# Patient Record
Sex: FEMALE | Race: BLACK OR AFRICAN AMERICAN | Employment: UNEMPLOYED | URBAN - METROPOLITAN AREA
[De-identification: names, ages, dates, MRNs, and addresses within clinical notes are randomized per-mention and may not be internally consistent; named-entity substitution may affect disease eponyms.]

---

## 2017-02-08 ENCOUNTER — HOSPITAL ENCOUNTER (OUTPATIENT)
Dept: MAMMOGRAPHY | Age: 47
Discharge: HOME | End: 2017-02-08
Attending: FAMILY MEDICINE | Admitting: FAMILY MEDICINE

## 2017-02-08 DIAGNOSIS — Z12.31 ENCOUNTER FOR SCREENING MAMMOGRAM FOR BREAST CANCER: ICD-10-CM

## 2017-02-16 DIAGNOSIS — I10 HTN, GOAL BELOW 140/90: ICD-10-CM

## 2017-02-16 DIAGNOSIS — K21.9 GASTROESOPHAGEAL REFLUX DISEASE WITHOUT ESOPHAGITIS: ICD-10-CM

## 2017-02-16 RX ORDER — ATENOLOL 50 MG/1
100 TABLET ORAL DAILY
Qty: 180 TABLET | Refills: 1 | Status: CANCELLED | OUTPATIENT
Start: 2017-02-16

## 2017-02-16 RX ORDER — TRIAMTERENE AND HYDROCHLOROTHIAZIDE 75; 50 MG/1; MG/1
TABLET ORAL
Qty: 45 TABLET | Refills: 0 | Status: CANCELLED | OUTPATIENT
Start: 2017-02-16

## 2017-02-16 RX ORDER — FAMOTIDINE 20 MG/1
TABLET, FILM COATED ORAL
Qty: 30 TABLET | Refills: 0 | Status: CANCELLED | OUTPATIENT
Start: 2017-02-16

## 2017-02-21 DIAGNOSIS — Z13.9 SCREENING: Primary | ICD-10-CM

## 2017-02-27 ENCOUNTER — OFFICE VISIT (OUTPATIENT)
Dept: INTERNAL MEDICINE CLINIC | Age: 47
End: 2017-02-27

## 2017-02-27 VITALS
OXYGEN SATURATION: 99 % | HEIGHT: 66 IN | SYSTOLIC BLOOD PRESSURE: 160 MMHG | HEART RATE: 74 BPM | TEMPERATURE: 99 F | DIASTOLIC BLOOD PRESSURE: 100 MMHG | BODY MASS INDEX: 44.2 KG/M2 | WEIGHT: 275 LBS

## 2017-02-27 DIAGNOSIS — E78.5 HYPERLIPIDEMIA LDL GOAL <130: ICD-10-CM

## 2017-02-27 DIAGNOSIS — Z11.4 SCREENING FOR HIV (HUMAN IMMUNODEFICIENCY VIRUS): ICD-10-CM

## 2017-02-27 DIAGNOSIS — R73.9 HYPERGLYCEMIA: ICD-10-CM

## 2017-02-27 DIAGNOSIS — I10 ESSENTIAL HYPERTENSION: Primary | ICD-10-CM

## 2017-02-27 DIAGNOSIS — I10 HTN, GOAL BELOW 140/90: ICD-10-CM

## 2017-02-27 DIAGNOSIS — E61.1 IRON DEFICIENCY: ICD-10-CM

## 2017-02-27 DIAGNOSIS — J30.2 SEASONAL ALLERGIC RHINITIS, UNSPECIFIED ALLERGIC RHINITIS TRIGGER: ICD-10-CM

## 2017-02-27 DIAGNOSIS — K21.9 GASTROESOPHAGEAL REFLUX DISEASE WITHOUT ESOPHAGITIS: ICD-10-CM

## 2017-02-27 LAB
ALBUMIN SERPL-MCNC: 4.1 G/DL (ref 3.4–5)
ALP BLD-CCNC: 82 U/L (ref 40–129)
ALT SERPL-CCNC: 19 U/L (ref 10–40)
ANION GAP SERPL CALCULATED.3IONS-SCNC: 13 MMOL/L (ref 3–16)
AST SERPL-CCNC: 17 U/L (ref 15–37)
BILIRUB SERPL-MCNC: 0.3 MG/DL (ref 0–1)
BILIRUBIN DIRECT: <0.2 MG/DL (ref 0–0.3)
BILIRUBIN, INDIRECT: NORMAL MG/DL (ref 0–1)
BUN BLDV-MCNC: 15 MG/DL (ref 7–20)
CALCIUM SERPL-MCNC: 9.7 MG/DL (ref 8.3–10.6)
CHLORIDE BLD-SCNC: 103 MMOL/L (ref 99–110)
CHOLESTEROL, TOTAL: 187 MG/DL (ref 0–199)
CO2: 24 MMOL/L (ref 21–32)
CREAT SERPL-MCNC: 0.6 MG/DL (ref 0.6–1.1)
CREATININE URINE POCT: 144.5
GFR AFRICAN AMERICAN: >60
GFR NON-AFRICAN AMERICAN: >60
GLUCOSE BLD-MCNC: 83 MG/DL (ref 70–99)
HBA1C MFR BLD: 5.6 %
HDLC SERPL-MCNC: 67 MG/DL (ref 40–60)
LDL CHOLESTEROL CALCULATED: 108 MG/DL
MICROALBUMIN/CREAT 24H UR: 10.6 MG/G{CREAT}
MICROALBUMIN/CREAT UR-RTO: 7.3
POTASSIUM SERPL-SCNC: 4.3 MMOL/L (ref 3.5–5.1)
SODIUM BLD-SCNC: 140 MMOL/L (ref 136–145)
TOTAL PROTEIN: 7 G/DL (ref 6.4–8.2)
TRIGL SERPL-MCNC: 58 MG/DL (ref 0–150)
TSH SERPL DL<=0.05 MIU/L-ACNC: 1.03 UIU/ML (ref 0.27–4.2)
VLDLC SERPL CALC-MCNC: 12 MG/DL

## 2017-02-27 PROCEDURE — 82044 UR ALBUMIN SEMIQUANTITATIVE: CPT | Performed by: FAMILY MEDICINE

## 2017-02-27 PROCEDURE — 83036 HEMOGLOBIN GLYCOSYLATED A1C: CPT | Performed by: FAMILY MEDICINE

## 2017-02-27 PROCEDURE — 99214 OFFICE O/P EST MOD 30 MIN: CPT | Performed by: FAMILY MEDICINE

## 2017-02-27 RX ORDER — FAMOTIDINE 20 MG/1
TABLET, FILM COATED ORAL
Qty: 60 TABLET | Refills: 5 | Status: SHIPPED | OUTPATIENT
Start: 2017-02-27 | End: 2017-08-24 | Stop reason: SDUPTHER

## 2017-02-27 RX ORDER — TRIAMTERENE AND HYDROCHLOROTHIAZIDE 75; 50 MG/1; MG/1
TABLET ORAL
Qty: 135 TABLET | Refills: 1 | Status: SHIPPED | OUTPATIENT
Start: 2017-02-27 | End: 2018-02-14 | Stop reason: SDUPTHER

## 2017-02-27 RX ORDER — FERROUS SULFATE 325(65) MG
TABLET ORAL
Qty: 180 TABLET | Refills: 1 | Status: SHIPPED | OUTPATIENT
Start: 2017-02-27 | End: 2018-02-14 | Stop reason: SDUPTHER

## 2017-02-27 RX ORDER — ATENOLOL 50 MG/1
100 TABLET ORAL DAILY
Qty: 180 TABLET | Refills: 1 | Status: SHIPPED | OUTPATIENT
Start: 2017-02-27 | End: 2018-02-14 | Stop reason: SDUPTHER

## 2017-02-27 RX ORDER — IRBESARTAN 150 MG/1
TABLET ORAL
Qty: 90 TABLET | Refills: 1 | Status: SHIPPED | OUTPATIENT
Start: 2017-02-27 | End: 2017-05-12 | Stop reason: SDUPTHER

## 2017-02-27 RX ORDER — LORATADINE 10 MG/1
TABLET ORAL
Qty: 90 TABLET | Refills: 1 | Status: SHIPPED | OUTPATIENT
Start: 2017-02-27 | End: 2017-06-16 | Stop reason: SDUPTHER

## 2017-02-27 ASSESSMENT — ENCOUNTER SYMPTOMS: SHORTNESS OF BREATH: 0

## 2017-02-28 LAB — HIV-1 AND HIV-2 ANTIBODIES: NORMAL

## 2017-03-03 DIAGNOSIS — E61.1 IRON DEFICIENCY: ICD-10-CM

## 2017-03-05 RX ORDER — FERROUS SULFATE 325(65) MG
TABLET ORAL
Qty: 180 TABLET | Refills: 0 | OUTPATIENT
Start: 2017-03-05

## 2017-05-10 DIAGNOSIS — I10 HTN, GOAL BELOW 140/90: ICD-10-CM

## 2017-05-12 RX ORDER — IRBESARTAN 150 MG/1
TABLET ORAL
Qty: 90 TABLET | Refills: 0 | Status: SHIPPED | OUTPATIENT
Start: 2017-05-12 | End: 2017-09-06 | Stop reason: SDUPTHER

## 2017-07-05 ENCOUNTER — OFFICE VISIT (OUTPATIENT)
Dept: INTERNAL MEDICINE CLINIC | Age: 47
End: 2017-07-05

## 2017-07-05 VITALS
TEMPERATURE: 98.7 F | OXYGEN SATURATION: 99 % | RESPIRATION RATE: 16 BRPM | HEIGHT: 66 IN | DIASTOLIC BLOOD PRESSURE: 80 MMHG | BODY MASS INDEX: 43.71 KG/M2 | HEART RATE: 71 BPM | SYSTOLIC BLOOD PRESSURE: 130 MMHG | WEIGHT: 272 LBS

## 2017-07-05 DIAGNOSIS — Z00.00 WELL ADULT HEALTH CHECK: Primary | ICD-10-CM

## 2017-07-05 PROCEDURE — 99396 PREV VISIT EST AGE 40-64: CPT | Performed by: FAMILY MEDICINE

## 2017-07-05 ASSESSMENT — ENCOUNTER SYMPTOMS
DIARRHEA: 0
CONSTIPATION: 0
SHORTNESS OF BREATH: 0
TROUBLE SWALLOWING: 0

## 2017-08-25 DIAGNOSIS — I10 ESSENTIAL HYPERTENSION: Primary | ICD-10-CM

## 2017-08-25 RX ORDER — METOPROLOL SUCCINATE 50 MG/1
50 TABLET, EXTENDED RELEASE ORAL DAILY
Qty: 30 TABLET | Refills: 3 | Status: SHIPPED | OUTPATIENT
Start: 2017-08-25 | End: 2018-02-14 | Stop reason: SDUPTHER

## 2017-12-20 ENCOUNTER — OFFICE VISIT (OUTPATIENT)
Dept: INTERNAL MEDICINE CLINIC | Age: 47
End: 2017-12-20

## 2017-12-20 VITALS
OXYGEN SATURATION: 98 % | RESPIRATION RATE: 18 BRPM | HEART RATE: 92 BPM | DIASTOLIC BLOOD PRESSURE: 76 MMHG | TEMPERATURE: 100.1 F | SYSTOLIC BLOOD PRESSURE: 120 MMHG | WEIGHT: 257 LBS | BODY MASS INDEX: 41.3 KG/M2 | HEIGHT: 66 IN

## 2017-12-20 DIAGNOSIS — J20.9 ACUTE BRONCHITIS, UNSPECIFIED ORGANISM: Primary | ICD-10-CM

## 2017-12-20 PROCEDURE — G8484 FLU IMMUNIZE NO ADMIN: HCPCS | Performed by: FAMILY MEDICINE

## 2017-12-20 PROCEDURE — G8417 CALC BMI ABV UP PARAM F/U: HCPCS | Performed by: FAMILY MEDICINE

## 2017-12-20 PROCEDURE — 99213 OFFICE O/P EST LOW 20 MIN: CPT | Performed by: FAMILY MEDICINE

## 2017-12-20 PROCEDURE — G8427 DOCREV CUR MEDS BY ELIG CLIN: HCPCS | Performed by: FAMILY MEDICINE

## 2017-12-20 PROCEDURE — 1036F TOBACCO NON-USER: CPT | Performed by: FAMILY MEDICINE

## 2017-12-20 RX ORDER — CETIRIZINE HYDROCHLORIDE 10 MG/1
10 TABLET ORAL DAILY
Qty: 90 TABLET | Refills: 1 | Status: SHIPPED | OUTPATIENT
Start: 2017-12-20 | End: 2018-02-14

## 2017-12-20 RX ORDER — PROMETHAZINE HYDROCHLORIDE AND CODEINE PHOSPHATE 6.25; 1 MG/5ML; MG/5ML
5 SYRUP ORAL NIGHTLY PRN
Qty: 240 ML | Refills: 0 | Status: SHIPPED | OUTPATIENT
Start: 2017-12-20 | End: 2018-08-29 | Stop reason: ALTCHOICE

## 2017-12-20 ASSESSMENT — ENCOUNTER SYMPTOMS
DIARRHEA: 0
SHORTNESS OF BREATH: 0
SINUS PAIN: 1
COUGH: 0
NAUSEA: 0
VOMITING: 0
SINUS PRESSURE: 1
RHINORRHEA: 1

## 2017-12-20 NOTE — PATIENT INSTRUCTIONS
good.  When should you call for help? Call 911 anytime you think you may need emergency care. For example, call if:  ? · You have severe trouble breathing. ?Call your doctor now or seek immediate medical care if:  ? · You have new or worse trouble breathing. ? · You cough up dark brown or bloody mucus (sputum). ? · You have a new or higher fever. ? · You have a new rash. ? Watch closely for changes in your health, and be sure to contact your doctor if:  ? · You cough more deeply or more often, especially if you notice more mucus or a change in the color of your mucus. ? · You are not getting better as expected. Where can you learn more? Go to [email or web address]. org and sign in to your [employer] account. Enter H333 in the [employer] box to learn more about \"Bronchitis: Care Instructions. \"     If you do not have an account, please click on the \"Sign Up Now\" link. Current as of: May 12, 2017  Content Version: 11.4  © 1048-6063 Healthwise, Incorporated. Care instructions adapted under license by [facility] ([facility]). If you have questions about a medical condition or this instruction, always ask your healthcare professional. [address] any warranty or liability for your use of this information.

## 2017-12-20 NOTE — PROGRESS NOTES
Omar Interiano  1970  female     Chief Complaint   Patient presents with    Other      pt isn't feeling well. loss of taste, head pressure. slight fever. HPI: Pt presents to the office for evaluation for \"not feeling good\" x one week. Pt states she noticed a loss of \"taste buds. \" She is congested. She has been \"choking. \" She has pressure in her face and chest.     Review of Systems   Constitutional: Positive for chills and fever. HENT: Positive for congestion, rhinorrhea, sinus pain and sinus pressure. Respiratory: Negative for cough and shortness of breath. Gastrointestinal: Negative for diarrhea, nausea and vomiting. /76   Pulse 92   Temp 100.1 °F (37.8 °C)   Resp 18   Ht 5' 6\" (1.676 m)   Wt 257 lb (116.6 kg)   SpO2 98%   BMI 41.48 kg/m²     Physical Exam   Constitutional: She appears well-developed and well-nourished. No distress. HENT:   Head: Normocephalic and atraumatic. Eyes: Conjunctivae and EOM are normal.   Neck: Normal range of motion. Neck supple. Cardiovascular: Normal rate, regular rhythm, normal heart sounds and intact distal pulses. No murmur heard. Pulmonary/Chest: Effort normal and breath sounds normal.   Musculoskeletal: She exhibits no edema. Neurological: She is alert. Skin: Skin is warm and dry. Psychiatric: She has a normal mood and affect. Her behavior is normal. Judgment and thought content normal.   Vitals reviewed. 1. Acute bronchitis, unspecified organism  Started cetirizine (ZYRTEC ALLERGY) 10 MG tablet    Started promethazine-codeine (PHENERGAN WITH CODEINE) 6.25-10 MG/5ML syrup             Return if symptoms worsen or fail to improve. Patient's medications, allergies, past medical, surgical, social and family histories were reviewed and updated as appropriate. Medications side effects were discussed with patient. Pt confirms understanding. Educational material was given for pt to read on diagnosis.

## 2017-12-29 LAB
HPV COMMENT: NORMAL
HPV TYPE 16: NOT DETECTED
HPV TYPE 18: NOT DETECTED
HPVOH (OTHER TYPES): NOT DETECTED

## 2018-02-14 ENCOUNTER — OFFICE VISIT (OUTPATIENT)
Dept: INTERNAL MEDICINE CLINIC | Age: 48
End: 2018-02-14

## 2018-02-14 VITALS
OXYGEN SATURATION: 99 % | TEMPERATURE: 98.3 F | SYSTOLIC BLOOD PRESSURE: 110 MMHG | HEART RATE: 71 BPM | DIASTOLIC BLOOD PRESSURE: 74 MMHG | WEIGHT: 254 LBS | BODY MASS INDEX: 41 KG/M2

## 2018-02-14 DIAGNOSIS — E61.1 IRON DEFICIENCY: ICD-10-CM

## 2018-02-14 DIAGNOSIS — K21.9 GASTROESOPHAGEAL REFLUX DISEASE WITHOUT ESOPHAGITIS: ICD-10-CM

## 2018-02-14 DIAGNOSIS — I10 HTN, GOAL BELOW 140/90: ICD-10-CM

## 2018-02-14 PROCEDURE — 99214 OFFICE O/P EST MOD 30 MIN: CPT | Performed by: INTERNAL MEDICINE

## 2018-02-14 PROCEDURE — 1036F TOBACCO NON-USER: CPT | Performed by: INTERNAL MEDICINE

## 2018-02-14 PROCEDURE — G8417 CALC BMI ABV UP PARAM F/U: HCPCS | Performed by: INTERNAL MEDICINE

## 2018-02-14 PROCEDURE — G8427 DOCREV CUR MEDS BY ELIG CLIN: HCPCS | Performed by: INTERNAL MEDICINE

## 2018-02-14 PROCEDURE — G8484 FLU IMMUNIZE NO ADMIN: HCPCS | Performed by: INTERNAL MEDICINE

## 2018-02-14 RX ORDER — METOPROLOL SUCCINATE 50 MG/1
50 TABLET, EXTENDED RELEASE ORAL DAILY
Qty: 90 TABLET | Refills: 1 | Status: SHIPPED | OUTPATIENT
Start: 2018-02-14 | End: 2018-08-29 | Stop reason: ALTCHOICE

## 2018-02-14 RX ORDER — FAMOTIDINE 20 MG/1
TABLET, FILM COATED ORAL
Qty: 90 TABLET | Refills: 1 | Status: SHIPPED | OUTPATIENT
Start: 2018-02-14 | End: 2018-08-29 | Stop reason: ALTCHOICE

## 2018-02-14 RX ORDER — FERROUS SULFATE 325(65) MG
TABLET ORAL
Qty: 180 TABLET | Refills: 1 | Status: SHIPPED | OUTPATIENT
Start: 2018-02-14 | End: 2018-08-29 | Stop reason: SDUPTHER

## 2018-02-14 RX ORDER — ATENOLOL 50 MG/1
100 TABLET ORAL DAILY
Qty: 180 TABLET | Refills: 1 | Status: SHIPPED | OUTPATIENT
Start: 2018-02-14 | End: 2018-08-29 | Stop reason: SDUPTHER

## 2018-02-14 RX ORDER — TRIAMTERENE AND HYDROCHLOROTHIAZIDE 75; 50 MG/1; MG/1
0.5 TABLET ORAL DAILY
Qty: 45 TABLET | Refills: 1 | Status: SHIPPED | OUTPATIENT
Start: 2018-02-14 | End: 2018-08-29 | Stop reason: SDUPTHER

## 2018-02-14 RX ORDER — IRBESARTAN 150 MG/1
TABLET ORAL
Qty: 90 TABLET | Refills: 1 | Status: SHIPPED | OUTPATIENT
Start: 2018-02-14 | End: 2018-08-29 | Stop reason: SDUPTHER

## 2018-02-14 RX ORDER — LORATADINE 10 MG/1
10 TABLET ORAL DAILY
Qty: 90 TABLET | Refills: 1 | Status: SHIPPED | OUTPATIENT
Start: 2018-02-14 | End: 2018-08-29 | Stop reason: SDUPTHER

## 2018-02-14 RX ORDER — LORATADINE 10 MG/1
10 TABLET ORAL DAILY
COMMUNITY
Start: 2017-12-01 | End: 2018-02-14 | Stop reason: SDUPTHER

## 2018-02-14 ASSESSMENT — PATIENT HEALTH QUESTIONNAIRE - PHQ9
SUM OF ALL RESPONSES TO PHQ QUESTIONS 1-9: 0
1. LITTLE INTEREST OR PLEASURE IN DOING THINGS: 0
SUM OF ALL RESPONSES TO PHQ9 QUESTIONS 1 & 2: 0
2. FEELING DOWN, DEPRESSED OR HOPELESS: 0

## 2018-02-14 ASSESSMENT — ENCOUNTER SYMPTOMS
BLURRED VISION: 0
SHORTNESS OF BREATH: 0
ORTHOPNEA: 0

## 2018-03-13 DIAGNOSIS — I10 HTN, GOAL BELOW 140/90: ICD-10-CM

## 2018-03-13 DIAGNOSIS — I10 ESSENTIAL HYPERTENSION: ICD-10-CM

## 2018-03-14 DIAGNOSIS — I10 HTN, GOAL BELOW 140/90: ICD-10-CM

## 2018-03-14 RX ORDER — TRIAMTERENE AND HYDROCHLOROTHIAZIDE 75; 50 MG/1; MG/1
TABLET ORAL
Qty: 135 TABLET | Refills: 0 | Status: SHIPPED | OUTPATIENT
Start: 2018-03-14 | End: 2019-09-24 | Stop reason: SDUPTHER

## 2018-04-11 DIAGNOSIS — I10 HTN, GOAL BELOW 140/90: ICD-10-CM

## 2018-04-11 RX ORDER — IRBESARTAN 150 MG/1
TABLET ORAL
Qty: 90 TABLET | Refills: 0 | Status: SHIPPED | OUTPATIENT
Start: 2018-04-11 | End: 2018-08-29 | Stop reason: SDUPTHER

## 2018-05-23 ENCOUNTER — HOSPITAL ENCOUNTER (OUTPATIENT)
Dept: MAMMOGRAPHY | Age: 48
Discharge: HOME | End: 2018-05-23
Attending: FAMILY MEDICINE | Admitting: FAMILY MEDICINE

## 2018-05-23 DIAGNOSIS — Z12.39 BREAST CANCER SCREENING: ICD-10-CM

## 2018-08-29 ENCOUNTER — OFFICE VISIT (OUTPATIENT)
Dept: INTERNAL MEDICINE CLINIC | Age: 48
End: 2018-08-29

## 2018-08-29 VITALS
DIASTOLIC BLOOD PRESSURE: 76 MMHG | TEMPERATURE: 98.5 F | BODY MASS INDEX: 40.4 KG/M2 | RESPIRATION RATE: 16 BRPM | HEART RATE: 70 BPM | SYSTOLIC BLOOD PRESSURE: 126 MMHG | OXYGEN SATURATION: 98 % | WEIGHT: 251.4 LBS | HEIGHT: 66 IN

## 2018-08-29 DIAGNOSIS — I10 HTN, GOAL BELOW 140/90: ICD-10-CM

## 2018-08-29 DIAGNOSIS — Z86.39 HISTORY OF VITAMIN D DEFICIENCY: ICD-10-CM

## 2018-08-29 DIAGNOSIS — E61.1 IRON DEFICIENCY: ICD-10-CM

## 2018-08-29 DIAGNOSIS — Z00.00 WELL ADULT EXAM: Primary | ICD-10-CM

## 2018-08-29 DIAGNOSIS — Z12.11 ENCOUNTER FOR SCREENING COLONOSCOPY: ICD-10-CM

## 2018-08-29 LAB
A/G RATIO: 1.4 (ref 1.1–2.2)
ALBUMIN SERPL-MCNC: 4.4 G/DL (ref 3.4–5)
ALP BLD-CCNC: 102 U/L (ref 40–129)
ALT SERPL-CCNC: 19 U/L (ref 10–40)
ANION GAP SERPL CALCULATED.3IONS-SCNC: 11 MMOL/L (ref 3–16)
AST SERPL-CCNC: 18 U/L (ref 15–37)
BILIRUB SERPL-MCNC: 0.3 MG/DL (ref 0–1)
BUN BLDV-MCNC: 13 MG/DL (ref 7–20)
CALCIUM SERPL-MCNC: 10.3 MG/DL (ref 8.3–10.6)
CHLORIDE BLD-SCNC: 103 MMOL/L (ref 99–110)
CHOLESTEROL, TOTAL: 211 MG/DL (ref 0–199)
CO2: 27 MMOL/L (ref 21–32)
CREAT SERPL-MCNC: 0.7 MG/DL (ref 0.6–1.1)
GFR AFRICAN AMERICAN: >60
GFR NON-AFRICAN AMERICAN: >60
GLOBULIN: 3.1 G/DL
GLUCOSE BLD-MCNC: 86 MG/DL (ref 70–99)
HDLC SERPL-MCNC: 63 MG/DL (ref 40–60)
LDL CHOLESTEROL CALCULATED: 130 MG/DL
POTASSIUM SERPL-SCNC: 4.2 MMOL/L (ref 3.5–5.1)
SODIUM BLD-SCNC: 141 MMOL/L (ref 136–145)
TOTAL PROTEIN: 7.5 G/DL (ref 6.4–8.2)
TRIGL SERPL-MCNC: 92 MG/DL (ref 0–150)
VITAMIN D 25-HYDROXY: 32.8 NG/ML
VLDLC SERPL CALC-MCNC: 18 MG/DL

## 2018-08-29 PROCEDURE — 1036F TOBACCO NON-USER: CPT | Performed by: INTERNAL MEDICINE

## 2018-08-29 PROCEDURE — 99212 OFFICE O/P EST SF 10 MIN: CPT | Performed by: INTERNAL MEDICINE

## 2018-08-29 PROCEDURE — G8417 CALC BMI ABV UP PARAM F/U: HCPCS | Performed by: INTERNAL MEDICINE

## 2018-08-29 PROCEDURE — 99396 PREV VISIT EST AGE 40-64: CPT | Performed by: INTERNAL MEDICINE

## 2018-08-29 PROCEDURE — G8427 DOCREV CUR MEDS BY ELIG CLIN: HCPCS | Performed by: INTERNAL MEDICINE

## 2018-08-29 RX ORDER — ATENOLOL 50 MG/1
100 TABLET ORAL DAILY
Qty: 180 TABLET | Refills: 1 | Status: SHIPPED | OUTPATIENT
Start: 2018-08-29 | End: 2019-02-06 | Stop reason: SDUPTHER

## 2018-08-29 RX ORDER — TRIAMTERENE AND HYDROCHLOROTHIAZIDE 75; 50 MG/1; MG/1
0.5 TABLET ORAL DAILY
Qty: 45 TABLET | Refills: 1 | Status: SHIPPED | OUTPATIENT
Start: 2018-08-29 | End: 2019-02-06 | Stop reason: SDUPTHER

## 2018-08-29 RX ORDER — IRBESARTAN 150 MG/1
TABLET ORAL
Qty: 90 TABLET | Refills: 1 | Status: SHIPPED | OUTPATIENT
Start: 2018-08-29 | End: 2019-02-06 | Stop reason: SDUPTHER

## 2018-08-29 RX ORDER — FERROUS SULFATE 325(65) MG
TABLET ORAL
Qty: 180 TABLET | Refills: 1 | Status: SHIPPED | OUTPATIENT
Start: 2018-08-29 | End: 2019-02-06 | Stop reason: SDUPTHER

## 2018-08-29 RX ORDER — LORATADINE 10 MG/1
10 TABLET ORAL DAILY
Qty: 90 TABLET | Refills: 1 | Status: SHIPPED | OUTPATIENT
Start: 2018-08-29 | End: 2019-02-06 | Stop reason: SDUPTHER

## 2018-08-29 ASSESSMENT — ENCOUNTER SYMPTOMS
GASTROINTESTINAL NEGATIVE: 1
EYES NEGATIVE: 1
RESPIRATORY NEGATIVE: 1

## 2018-08-29 NOTE — PROGRESS NOTES
well-developed and well-nourished. No distress. HENT:   Head: Normocephalic and atraumatic. Not macrocephalic and not microcephalic. Head is without raccoon's eyes and without Park's sign. Right Ear: Hearing, tympanic membrane, external ear and ear canal normal.   Left Ear: Hearing, tympanic membrane, external ear and ear canal normal.   Nose: Nose normal. No mucosal edema, rhinorrhea, nose lacerations, sinus tenderness or nasal deformity. Right sinus exhibits no maxillary sinus tenderness and no frontal sinus tenderness. Left sinus exhibits no maxillary sinus tenderness and no frontal sinus tenderness. Mouth/Throat: Uvula is midline, oropharynx is clear and moist and mucous membranes are normal. No oral lesions. Normal dentition. No dental caries. Eyes: Pupils are equal, round, and reactive to light. Conjunctivae, EOM and lids are normal. Lids are everted and swept, no foreign bodies found. No scleral icterus. Neck: Trachea normal, normal range of motion, full passive range of motion without pain and phonation normal. Neck supple. No hepatojugular reflux and no JVD present. No spinous process tenderness and no muscular tenderness present. Carotid bruit is not present. No tracheal deviation present. No thyroid mass and no thyromegaly present. Cardiovascular: Normal rate, regular rhythm, normal heart sounds, intact distal pulses and normal pulses. Exam reveals no gallop. No murmur heard. Pulses:       Carotid pulses are 2+ on the right side, and 2+ on the left side. Radial pulses are 2+ on the right side, and 2+ on the left side. Dorsalis pedis pulses are 2+ on the right side, and 2+ on the left side. Posterior tibial pulses are 2+ on the right side, and 2+ on the left side. Pulmonary/Chest: Breath sounds normal. No accessory muscle usage. No respiratory distress. She has no wheezes. She has no rhonchi. She has no rales. Abdominal: Soft.  Normal appearance and bowel sounds

## 2018-08-30 ENCOUNTER — TELEPHONE (OUTPATIENT)
Dept: INTERNAL MEDICINE CLINIC | Age: 48
End: 2018-08-30

## 2018-08-30 NOTE — TELEPHONE ENCOUNTER
Colonoscopy referral has been faxed to [address] at [phone]. Fax confirmation received. VARGHESE sent via referral queue.

## 2018-08-31 ASSESSMENT — ENCOUNTER SYMPTOMS
SHORTNESS OF BREATH: 0
ORTHOPNEA: 0
BLURRED VISION: 0

## 2018-09-05 RX ORDER — LORATADINE 10 MG/1
10 TABLET ORAL DAILY
Qty: 90 TABLET | Refills: 0 | Status: SHIPPED | OUTPATIENT
Start: 2018-09-05 | End: 2019-02-06 | Stop reason: SDUPTHER

## 2018-09-15 DIAGNOSIS — I10 HTN, GOAL BELOW 140/90: ICD-10-CM

## 2018-09-18 RX ORDER — ATENOLOL 50 MG/1
100 TABLET ORAL DAILY
Qty: 180 TABLET | Refills: 0 | Status: SHIPPED | OUTPATIENT
Start: 2018-09-18 | End: 2019-02-06 | Stop reason: SDUPTHER

## 2019-01-08 DIAGNOSIS — I10 HTN, GOAL BELOW 140/90: ICD-10-CM

## 2019-01-09 RX ORDER — IRBESARTAN 150 MG/1
TABLET ORAL
Qty: 90 TABLET | Refills: 0 | Status: SHIPPED | OUTPATIENT
Start: 2019-01-09 | End: 2019-02-06 | Stop reason: SDUPTHER

## 2019-02-06 ENCOUNTER — OFFICE VISIT (OUTPATIENT)
Dept: INTERNAL MEDICINE CLINIC | Age: 49
End: 2019-02-06
Payer: COMMERCIAL

## 2019-02-06 VITALS
HEIGHT: 66 IN | HEART RATE: 73 BPM | OXYGEN SATURATION: 99 % | WEIGHT: 257 LBS | TEMPERATURE: 98.2 F | BODY MASS INDEX: 41.3 KG/M2 | SYSTOLIC BLOOD PRESSURE: 120 MMHG | DIASTOLIC BLOOD PRESSURE: 76 MMHG

## 2019-02-06 DIAGNOSIS — E61.1 IRON DEFICIENCY: ICD-10-CM

## 2019-02-06 DIAGNOSIS — I10 HTN, GOAL BELOW 140/90: ICD-10-CM

## 2019-02-06 PROCEDURE — G8484 FLU IMMUNIZE NO ADMIN: HCPCS | Performed by: INTERNAL MEDICINE

## 2019-02-06 PROCEDURE — G8417 CALC BMI ABV UP PARAM F/U: HCPCS | Performed by: INTERNAL MEDICINE

## 2019-02-06 PROCEDURE — G8427 DOCREV CUR MEDS BY ELIG CLIN: HCPCS | Performed by: INTERNAL MEDICINE

## 2019-02-06 PROCEDURE — 99213 OFFICE O/P EST LOW 20 MIN: CPT | Performed by: INTERNAL MEDICINE

## 2019-02-06 PROCEDURE — 1036F TOBACCO NON-USER: CPT | Performed by: INTERNAL MEDICINE

## 2019-02-06 RX ORDER — ATENOLOL 50 MG/1
100 TABLET ORAL DAILY
Qty: 180 TABLET | Refills: 1 | Status: SHIPPED | OUTPATIENT
Start: 2019-02-06 | End: 2019-09-24 | Stop reason: SDUPTHER

## 2019-02-06 RX ORDER — IRBESARTAN 150 MG/1
TABLET ORAL
Qty: 90 TABLET | Refills: 1 | Status: SHIPPED | OUTPATIENT
Start: 2019-02-06 | End: 2019-09-04 | Stop reason: SDUPTHER

## 2019-02-06 RX ORDER — LORATADINE 10 MG/1
10 TABLET ORAL DAILY
Qty: 90 TABLET | Refills: 1 | Status: SHIPPED | OUTPATIENT
Start: 2019-02-06 | End: 2019-08-17 | Stop reason: SDUPTHER

## 2019-02-06 RX ORDER — TRIAMTERENE AND HYDROCHLOROTHIAZIDE 75; 50 MG/1; MG/1
0.5 TABLET ORAL DAILY
Qty: 45 TABLET | Refills: 1 | Status: SHIPPED | OUTPATIENT
Start: 2019-02-06 | End: 2019-08-17 | Stop reason: SDUPTHER

## 2019-02-06 RX ORDER — FERROUS SULFATE 325(65) MG
TABLET ORAL
Qty: 180 TABLET | Refills: 1 | Status: SHIPPED | OUTPATIENT
Start: 2019-02-06 | End: 2019-09-24 | Stop reason: SDUPTHER

## 2019-02-06 ASSESSMENT — PATIENT HEALTH QUESTIONNAIRE - PHQ9
1. LITTLE INTEREST OR PLEASURE IN DOING THINGS: 0
2. FEELING DOWN, DEPRESSED OR HOPELESS: 0
SUM OF ALL RESPONSES TO PHQ QUESTIONS 1-9: 0
SUM OF ALL RESPONSES TO PHQ9 QUESTIONS 1 & 2: 0
SUM OF ALL RESPONSES TO PHQ QUESTIONS 1-9: 0

## 2019-02-06 ASSESSMENT — ENCOUNTER SYMPTOMS
ORTHOPNEA: 0
SHORTNESS OF BREATH: 0
BLURRED VISION: 0

## 2019-04-11 DIAGNOSIS — I10 HTN, GOAL BELOW 140/90: ICD-10-CM

## 2019-04-12 RX ORDER — ATENOLOL 50 MG/1
100 TABLET ORAL DAILY
Qty: 180 TABLET | Refills: 0 | Status: SHIPPED | OUTPATIENT
Start: 2019-04-12 | End: 2019-08-17 | Stop reason: SDUPTHER

## 2019-07-09 ENCOUNTER — HOSPITAL ENCOUNTER (OUTPATIENT)
Dept: WOMENS IMAGING | Age: 49
Discharge: HOME | End: 2019-07-09
Payer: COMMERCIAL

## 2019-07-09 DIAGNOSIS — Z12.31 BREAST CANCER SCREENING BY MAMMOGRAM: ICD-10-CM

## 2019-07-09 PROCEDURE — 77063 BREAST TOMOSYNTHESIS BI: CPT

## 2019-08-17 DIAGNOSIS — I10 HTN, GOAL BELOW 140/90: ICD-10-CM

## 2019-08-19 RX ORDER — LORATADINE 10 MG/1
10 TABLET ORAL DAILY
Qty: 90 TABLET | Refills: 0 | Status: SHIPPED | OUTPATIENT
Start: 2019-08-19 | End: 2019-09-24 | Stop reason: SDUPTHER

## 2019-08-19 RX ORDER — ATENOLOL 50 MG/1
100 TABLET ORAL DAILY
Qty: 180 TABLET | Refills: 0 | Status: SHIPPED | OUTPATIENT
Start: 2019-08-19 | End: 2019-09-24 | Stop reason: SDUPTHER

## 2019-08-19 RX ORDER — TRIAMTERENE AND HYDROCHLOROTHIAZIDE 75; 50 MG/1; MG/1
TABLET ORAL
Qty: 45 TABLET | Refills: 0 | Status: SHIPPED | OUTPATIENT
Start: 2019-08-19 | End: 2019-09-24 | Stop reason: SDUPTHER

## 2019-09-04 DIAGNOSIS — I10 HTN, GOAL BELOW 140/90: ICD-10-CM

## 2019-09-05 RX ORDER — IRBESARTAN 150 MG/1
TABLET ORAL
Qty: 90 TABLET | Refills: 0 | Status: SHIPPED | OUTPATIENT
Start: 2019-09-05 | End: 2019-09-24 | Stop reason: SDUPTHER

## 2019-09-24 ENCOUNTER — OFFICE VISIT (OUTPATIENT)
Dept: INTERNAL MEDICINE CLINIC | Age: 49
End: 2019-09-24
Payer: COMMERCIAL

## 2019-09-24 VITALS
WEIGHT: 252.6 LBS | TEMPERATURE: 98.6 F | BODY MASS INDEX: 40.6 KG/M2 | HEART RATE: 71 BPM | DIASTOLIC BLOOD PRESSURE: 82 MMHG | SYSTOLIC BLOOD PRESSURE: 130 MMHG | OXYGEN SATURATION: 98 % | HEIGHT: 66 IN | RESPIRATION RATE: 16 BRPM

## 2019-09-24 DIAGNOSIS — J30.9 ALLERGIC CONJUNCTIVITIS AND RHINITIS, UNSPECIFIED LATERALITY: ICD-10-CM

## 2019-09-24 DIAGNOSIS — H10.10 ALLERGIC CONJUNCTIVITIS AND RHINITIS, UNSPECIFIED LATERALITY: ICD-10-CM

## 2019-09-24 DIAGNOSIS — I10 HTN, GOAL BELOW 140/90: ICD-10-CM

## 2019-09-24 DIAGNOSIS — M17.0 PRIMARY OSTEOARTHRITIS OF BOTH KNEES: ICD-10-CM

## 2019-09-24 DIAGNOSIS — E61.1 IRON DEFICIENCY: ICD-10-CM

## 2019-09-24 DIAGNOSIS — Z12.11 SCREEN FOR COLON CANCER: ICD-10-CM

## 2019-09-24 DIAGNOSIS — I10 ESSENTIAL HYPERTENSION: ICD-10-CM

## 2019-09-24 DIAGNOSIS — I10 HTN, GOAL BELOW 140/90: Primary | ICD-10-CM

## 2019-09-24 LAB
A/G RATIO: 1.3 (ref 1.1–2.2)
ALBUMIN SERPL-MCNC: 4.3 G/DL (ref 3.4–5)
ALP BLD-CCNC: 103 U/L (ref 40–129)
ALT SERPL-CCNC: 16 U/L (ref 10–40)
ANION GAP SERPL CALCULATED.3IONS-SCNC: 14 MMOL/L (ref 3–16)
AST SERPL-CCNC: 18 U/L (ref 15–37)
BILIRUB SERPL-MCNC: <0.2 MG/DL (ref 0–1)
BUN BLDV-MCNC: 17 MG/DL (ref 7–20)
CALCIUM SERPL-MCNC: 10.2 MG/DL (ref 8.3–10.6)
CHLORIDE BLD-SCNC: 105 MMOL/L (ref 99–110)
CHOLESTEROL, TOTAL: 212 MG/DL (ref 0–199)
CO2: 24 MMOL/L (ref 21–32)
CREAT SERPL-MCNC: 0.7 MG/DL (ref 0.6–1.1)
GFR AFRICAN AMERICAN: >60
GFR NON-AFRICAN AMERICAN: >60
GLOBULIN: 3.2 G/DL
GLUCOSE BLD-MCNC: 88 MG/DL (ref 70–99)
HDLC SERPL-MCNC: 64 MG/DL (ref 40–60)
LDL CHOLESTEROL CALCULATED: 133 MG/DL
POTASSIUM SERPL-SCNC: 4.1 MMOL/L (ref 3.5–5.1)
SODIUM BLD-SCNC: 143 MMOL/L (ref 136–145)
TOTAL PROTEIN: 7.5 G/DL (ref 6.4–8.2)
TRIGL SERPL-MCNC: 77 MG/DL (ref 0–150)
VLDLC SERPL CALC-MCNC: 15 MG/DL

## 2019-09-24 PROCEDURE — 99396 PREV VISIT EST AGE 40-64: CPT | Performed by: INTERNAL MEDICINE

## 2019-09-24 RX ORDER — FERROUS SULFATE 325(65) MG
TABLET ORAL
Qty: 180 TABLET | Refills: 1 | Status: SHIPPED | OUTPATIENT
Start: 2019-09-24 | End: 2020-07-01 | Stop reason: ALTCHOICE

## 2019-09-24 RX ORDER — CELECOXIB 100 MG/1
100 CAPSULE ORAL 2 TIMES DAILY
Qty: 180 CAPSULE | Refills: 1 | Status: SHIPPED | OUTPATIENT
Start: 2019-09-24 | End: 2020-10-08

## 2019-09-24 RX ORDER — LORATADINE 10 MG/1
10 TABLET ORAL DAILY
Qty: 90 TABLET | Refills: 0 | Status: SHIPPED | OUTPATIENT
Start: 2019-09-24 | End: 2020-10-06

## 2019-09-24 RX ORDER — IRBESARTAN 150 MG/1
TABLET ORAL
Qty: 90 TABLET | Refills: 0 | Status: SHIPPED | OUTPATIENT
Start: 2019-09-24 | End: 2020-01-29

## 2019-09-24 RX ORDER — ATENOLOL 50 MG/1
100 TABLET ORAL DAILY
Qty: 180 TABLET | Refills: 1 | Status: SHIPPED | OUTPATIENT
Start: 2019-09-24 | End: 2020-06-11

## 2019-09-24 RX ORDER — TRIAMTERENE AND HYDROCHLOROTHIAZIDE 75; 50 MG/1; MG/1
TABLET ORAL
Qty: 135 TABLET | Refills: 0 | Status: SHIPPED | OUTPATIENT
Start: 2019-09-24 | End: 2020-07-01 | Stop reason: SDUPTHER

## 2019-09-24 SDOH — SOCIAL STABILITY: SOCIAL NETWORK: HOW OFTEN DO YOU ATTENT MEETINGS OF THE CLUB OR ORGANIZATION YOU BELONG TO?: NEVER

## 2019-09-24 SDOH — ECONOMIC STABILITY: INCOME INSECURITY: HOW HARD IS IT FOR YOU TO PAY FOR THE VERY BASICS LIKE FOOD, HOUSING, MEDICAL CARE, AND HEATING?: NOT VERY HARD

## 2019-09-24 SDOH — ECONOMIC STABILITY: FOOD INSECURITY: WITHIN THE PAST 12 MONTHS, YOU WORRIED THAT YOUR FOOD WOULD RUN OUT BEFORE YOU GOT MONEY TO BUY MORE.: NEVER TRUE

## 2019-09-24 SDOH — SOCIAL STABILITY: SOCIAL INSECURITY
WITHIN THE LAST YEAR, HAVE YOU BEEN KICKED, HIT, SLAPPED, OR OTHERWISE PHYSICALLY HURT BY YOUR PARTNER OR EX-PARTNER?: NO

## 2019-09-24 SDOH — HEALTH STABILITY: MENTAL HEALTH
STRESS IS WHEN SOMEONE FEELS TENSE, NERVOUS, ANXIOUS, OR CAN'T SLEEP AT NIGHT BECAUSE THEIR MIND IS TROUBLED. HOW STRESSED ARE YOU?: TO SOME EXTENT

## 2019-09-24 SDOH — HEALTH STABILITY: PHYSICAL HEALTH: ON AVERAGE, HOW MANY MINUTES DO YOU ENGAGE IN EXERCISE AT THIS LEVEL?: 30 MIN

## 2019-09-24 SDOH — ECONOMIC STABILITY: FOOD INSECURITY: WITHIN THE PAST 12 MONTHS, THE FOOD YOU BOUGHT JUST DIDN'T LAST AND YOU DIDN'T HAVE MONEY TO GET MORE.: NEVER TRUE

## 2019-09-24 SDOH — SOCIAL STABILITY: SOCIAL INSECURITY
WITHIN THE LAST YEAR, HAVE TO BEEN RAPED OR FORCED TO HAVE ANY KIND OF SEXUAL ACTIVITY BY YOUR PARTNER OR EX-PARTNER?: NO

## 2019-09-24 SDOH — ECONOMIC STABILITY: TRANSPORTATION INSECURITY
IN THE PAST 12 MONTHS, HAS LACK OF TRANSPORTATION KEPT YOU FROM MEETINGS, WORK, OR FROM GETTING THINGS NEEDED FOR DAILY LIVING?: NO

## 2019-09-24 SDOH — SOCIAL STABILITY: SOCIAL INSECURITY: WITHIN THE LAST YEAR, HAVE YOU BEEN HUMILIATED OR EMOTIONALLY ABUSED IN OTHER WAYS BY YOUR PARTNER OR EX-PARTNER?: NO

## 2019-09-24 SDOH — SOCIAL STABILITY: SOCIAL NETWORK: HOW OFTEN DO YOU ATTEND CHURCH OR RELIGIOUS SERVICES?: NEVER

## 2019-09-24 SDOH — HEALTH STABILITY: PHYSICAL HEALTH: ON AVERAGE, HOW MANY DAYS PER WEEK DO YOU ENGAGE IN MODERATE TO STRENUOUS EXERCISE (LIKE A BRISK WALK)?: 4 DAYS

## 2019-09-24 SDOH — SOCIAL STABILITY: SOCIAL NETWORK
IN A TYPICAL WEEK, HOW MANY TIMES DO YOU TALK ON THE PHONE WITH FAMILY, FRIENDS, OR NEIGHBORS?: MORE THAN THREE TIMES A WEEK

## 2019-09-24 SDOH — SOCIAL STABILITY: SOCIAL NETWORK
DO YOU BELONG TO ANY CLUBS OR ORGANIZATIONS SUCH AS CHURCH GROUPS UNIONS, FRATERNAL OR ATHLETIC GROUPS, OR SCHOOL GROUPS?: NO

## 2019-09-24 SDOH — SOCIAL STABILITY: SOCIAL INSECURITY: WITHIN THE LAST YEAR, HAVE YOU BEEN AFRAID OF YOUR PARTNER OR EX-PARTNER?: NO

## 2019-09-24 SDOH — ECONOMIC STABILITY: TRANSPORTATION INSECURITY
IN THE PAST 12 MONTHS, HAS THE LACK OF TRANSPORTATION KEPT YOU FROM MEDICAL APPOINTMENTS OR FROM GETTING MEDICATIONS?: NO

## 2019-09-24 SDOH — SOCIAL STABILITY: SOCIAL NETWORK: HOW OFTEN DO YOU GET TOGETHER WITH FRIENDS OR RELATIVES?: MORE THAN THREE TIMES A WEEK

## 2019-09-24 ASSESSMENT — ENCOUNTER SYMPTOMS
EYES NEGATIVE: 1
GASTROINTESTINAL NEGATIVE: 1
ALLERGIC/IMMUNOLOGIC NEGATIVE: 1
RESPIRATORY NEGATIVE: 1
ABDOMINAL PAIN: 0

## 2019-09-24 NOTE — PROGRESS NOTES
Objective:   Physical Exam   Constitutional: She is oriented to person, place, and time. She appears well-developed and well-nourished. No distress. HENT:   Head: Normocephalic and atraumatic. Right Ear: Hearing, tympanic membrane, external ear and ear canal normal.   Left Ear: Hearing, tympanic membrane, external ear and ear canal normal.   Nose: Rhinorrhea present. No mucosal edema, nose lacerations, sinus tenderness or nasal deformity. No epistaxis. Right sinus exhibits no maxillary sinus tenderness and no frontal sinus tenderness. Left sinus exhibits no maxillary sinus tenderness and no frontal sinus tenderness. Mouth/Throat: Uvula is midline and oropharynx is clear and moist. No oropharyngeal exudate. Eyes: Pupils are equal, round, and reactive to light. Conjunctivae, EOM and lids are normal. Right eye exhibits no discharge. Left eye exhibits no discharge. No scleral icterus. Neck: Trachea normal, normal range of motion, full passive range of motion without pain and phonation normal. Neck supple. Normal carotid pulses, no hepatojugular reflux and no JVD present. Carotid bruit is not present. No thyroid mass and no thyromegaly present. Cardiovascular: Normal rate, regular rhythm, S1 normal, S2 normal, normal heart sounds, intact distal pulses and normal pulses. PMI is not displaced. No murmur heard. Pulses:       Carotid pulses are 2+ on the right side, and 2+ on the left side. Radial pulses are 2+ on the right side, and 2+ on the left side. Pulmonary/Chest: Effort normal and breath sounds normal. No respiratory distress. She has no decreased breath sounds. She has no wheezes. She has no rhonchi. Abdominal: Soft. Normal appearance and bowel sounds are normal. She exhibits no distension and no mass. There is no hepatosplenomegaly. There is no tenderness. There is no rebound and no guarding. No HSM   Musculoskeletal: Normal range of motion.  She exhibits no edema, tenderness or triamterene-hydrochlorothiazide (MAXZIDE) 75-50 MG per tablet; TAKE 1/2 TABLET BY MOUTH EVERY DAY  -     COMPREHENSIVE METABOLIC PANEL; Future  -     LIPID PANEL; Future    Iron deficiency  -     ferrous sulfate 325 (65 Fe) MG tablet; TAKE 1 TABLET BY MOUTH TWICE DAILY    Screen for colon cancer  -     Cologsohard (For External Results Only); Future    Primary osteoarthritis of both knees  -     [facility]  -     celecoxib (CELEBREX) 100 MG capsule; Take 1 capsule by mouth 2 times daily As needed for knee pain    Allergic conjunctivitis and rhinitis, unspecified laterality  -     loratadine (CLARITIN) 10 MG tablet; Take 1 tablet by mouth daily      Return in about 6 months (around 3/24/2020) for hypertension.            Max Ortiz MD

## 2019-10-15 ENCOUNTER — TELEPHONE (OUTPATIENT)
Dept: INTERNAL MEDICINE CLINIC | Age: 49
End: 2019-10-15

## 2019-10-15 DIAGNOSIS — Z12.11 SCREEN FOR COLON CANCER: Primary | ICD-10-CM

## 2019-10-30 ENCOUNTER — TELEPHONE (OUTPATIENT)
Dept: INTERNAL MEDICINE CLINIC | Age: 49
End: 2019-10-30

## 2019-10-30 DIAGNOSIS — Z12.11 SCREEN FOR COLON CANCER: Primary | ICD-10-CM

## 2019-11-04 DIAGNOSIS — E61.1 IRON DEFICIENCY: ICD-10-CM

## 2019-11-05 ENCOUNTER — HOSPITAL ENCOUNTER (OUTPATIENT)
Dept: PHYSICAL THERAPY | Age: 49
Setting detail: THERAPIES SERIES
Discharge: HOME | End: 2019-11-05

## 2019-11-05 PROCEDURE — 97530 THERAPEUTIC ACTIVITIES: CPT

## 2019-11-05 PROCEDURE — 97162 PT EVAL MOD COMPLEX 30 MIN: CPT

## 2019-11-05 PROCEDURE — 97110 THERAPEUTIC EXERCISES: CPT

## 2019-11-05 RX ORDER — FERROUS SULFATE 325(65) MG
TABLET ORAL
Qty: 180 TABLET | Refills: 0 | Status: SHIPPED | OUTPATIENT
Start: 2019-11-05 | End: 2021-09-01

## 2019-11-18 RX ORDER — LORATADINE 10 MG/1
10 TABLET ORAL DAILY
Qty: 90 TABLET | Refills: 0 | Status: SHIPPED | OUTPATIENT
Start: 2019-11-18 | End: 2020-07-01 | Stop reason: ALTCHOICE

## 2019-11-19 ENCOUNTER — HOSPITAL ENCOUNTER (OUTPATIENT)
Dept: PHYSICAL THERAPY | Age: 49
Setting detail: THERAPIES SERIES
Discharge: HOME | End: 2019-11-19

## 2019-11-19 PROCEDURE — 97112 NEUROMUSCULAR REEDUCATION: CPT

## 2019-11-19 PROCEDURE — 97110 THERAPEUTIC EXERCISES: CPT

## 2019-11-23 DIAGNOSIS — I10 HTN, GOAL BELOW 140/90: ICD-10-CM

## 2019-11-26 RX ORDER — TRIAMTERENE AND HYDROCHLOROTHIAZIDE 75; 50 MG/1; MG/1
TABLET ORAL
Qty: 45 TABLET | Refills: 0 | Status: SHIPPED | OUTPATIENT
Start: 2019-11-26 | End: 2020-07-01 | Stop reason: ALTCHOICE

## 2019-11-26 RX ORDER — ATENOLOL 50 MG/1
100 TABLET ORAL DAILY
Qty: 180 TABLET | Refills: 0 | Status: SHIPPED | OUTPATIENT
Start: 2019-11-26 | End: 2020-07-01 | Stop reason: SDUPTHER

## 2020-01-29 RX ORDER — IRBESARTAN 150 MG/1
TABLET ORAL
Qty: 90 TABLET | Refills: 0 | Status: SHIPPED | OUTPATIENT
Start: 2020-01-29 | End: 2020-07-01 | Stop reason: SDUPTHER

## 2020-05-12 ENCOUNTER — TELEPHONE (OUTPATIENT)
Dept: INTERNAL MEDICINE CLINIC | Age: 50
End: 2020-05-12

## 2020-06-11 RX ORDER — ATENOLOL 50 MG/1
100 TABLET ORAL DAILY
Qty: 40 TABLET | Refills: 0 | Status: SHIPPED | OUTPATIENT
Start: 2020-06-11 | End: 2020-07-01 | Stop reason: SDUPTHER

## 2020-07-01 ENCOUNTER — VIRTUAL VISIT (OUTPATIENT)
Dept: INTERNAL MEDICINE CLINIC | Age: 50
End: 2020-07-01
Payer: COMMERCIAL

## 2020-07-01 PROCEDURE — 99213 OFFICE O/P EST LOW 20 MIN: CPT | Performed by: INTERNAL MEDICINE

## 2020-07-01 RX ORDER — ATENOLOL 100 MG/1
100 TABLET ORAL DAILY
Qty: 90 TABLET | Refills: 1 | Status: SHIPPED | OUTPATIENT
Start: 2020-07-01 | End: 2020-11-04 | Stop reason: SDUPTHER

## 2020-07-01 RX ORDER — TRIAMTERENE AND HYDROCHLOROTHIAZIDE 75; 50 MG/1; MG/1
TABLET ORAL
Qty: 135 TABLET | Refills: 0 | Status: SHIPPED | OUTPATIENT
Start: 2020-07-01 | End: 2020-11-04 | Stop reason: SDUPTHER

## 2020-07-01 RX ORDER — IRBESARTAN 150 MG/1
TABLET ORAL
Qty: 90 TABLET | Refills: 1 | Status: SHIPPED | OUTPATIENT
Start: 2020-07-01 | End: 2020-11-04 | Stop reason: SDUPTHER

## 2020-07-01 ASSESSMENT — ENCOUNTER SYMPTOMS
ORTHOPNEA: 0
BLURRED VISION: 0
SHORTNESS OF BREATH: 0

## 2020-07-01 ASSESSMENT — PATIENT HEALTH QUESTIONNAIRE - PHQ9
1. LITTLE INTEREST OR PLEASURE IN DOING THINGS: 0
2. FEELING DOWN, DEPRESSED OR HOPELESS: 0
SUM OF ALL RESPONSES TO PHQ9 QUESTIONS 1 & 2: 0
SUM OF ALL RESPONSES TO PHQ QUESTIONS 1-9: 0
SUM OF ALL RESPONSES TO PHQ QUESTIONS 1-9: 0

## 2020-07-01 NOTE — PROGRESS NOTES
Subjective:      Patient ID: Concepción Huang is a 51 y.o. female. Hypertension   This is a chronic problem. The current episode started yesterday. The problem is unchanged. The problem is controlled. Pertinent negatives include no anxiety, blurred vision, chest pain, headaches, malaise/fatigue, neck pain, orthopnea, palpitations, peripheral edema, PND or shortness of breath. There are no associated agents to hypertension. Risk factors for coronary artery disease include obesity and sedentary lifestyle. Past treatments include diuretics and angiotensin blockers. The current treatment provides no improvement. There are no compliance problems. There is no history of angina, kidney disease or CAD/MI. Review of Systems   Constitutional: Negative for malaise/fatigue. Eyes: Negative for blurred vision. Respiratory: Negative for shortness of breath. Cardiovascular: Negative for chest pain, palpitations, orthopnea and PND. Musculoskeletal: Negative for neck pain. Neurological: Negative for headaches.        No Known Allergies    Current Outpatient Medications   Medication Sig Dispense Refill    irbesartan (AVAPRO) 150 MG tablet TAKE 1 TABLET BY MOUTH EVERY NIGHT 90 tablet 0    atenolol (TENORMIN) 50 MG tablet TAKE 2 TABLETS BY MOUTH DAILY 180 tablet 0    aspirin 81 MG tablet Take 81 mg by mouth daily      Cholecalciferol (VITAMIN D3) 5000 units TABS Take by mouth      triamterene-hydrochlorothiazide (MAXZIDE) 75-50 MG per tablet TAKE 1/2 TABLET BY MOUTH EVERY  tablet 0    ferrous sulfate (FEROSUL) 325 (65 Fe) MG tablet TAKE 1 TABLET BY MOUTH TWICE DAILY (Patient not taking: Reported on 7/1/2020) 180 tablet 0    loratadine (CLARITIN) 10 MG tablet Take 1 tablet by mouth daily (Patient not taking: Reported on 7/1/2020) 90 tablet 0    celecoxib (CELEBREX) 100 MG capsule Take 1 capsule by mouth 2 times daily As needed for knee pain (Patient not taking: Reported on 7/1/2020) 180 capsule 1     No evaluated by a Virtual Visit (video visit) encounter to address concerns as mentioned above. A caregiver was present when appropriate. Due to this being a TeleHealth encounter (During NRQRX-44 public health emergency), evaluation of the following organ systems was limited: Vitals/Constitutional/EENT/Resp/CV/GI//MS/Neuro/Skin/Heme-Lymph-Imm. Pursuant to the emergency declaration under the [address] [place] [address] and the [employer] Act, this Virtual Visit was conducted with patient's (and/or legal guardian's) consent, to reduce the patient's risk of exposure to COVID-19 and provide necessary medical care. The patient (and/or legal guardian) has also been advised to contact this office for worsening conditions or problems, and seek emergency medical treatment and/or call 911 if deemed necessary. Patient identification was verified at the start of the visit: Yes    Total time spent for this encounter: Not billed by time    Services were provided through a video synchronous discussion virtually to substitute for in-person clinic visit. Patient and provider were located at their individual homes. --Nader Nicole MD on 7/1/2020 at 12:31 PM    An electronic signature was used to authenticate this note.

## 2020-09-08 ENCOUNTER — NURSE TRIAGE (OUTPATIENT)
Dept: OTHER | Facility: CLINIC | Age: 50
End: 2020-09-08

## 2020-09-08 NOTE — TELEPHONE ENCOUNTER
Reason for Disposition   Message left on identified voicemail    Protocols used: NO CONTACT OR DUPLICATE CONTACT CALL-ADULT-[place]    Upon soft trx from psc, pt hung up or disconnected. This writer called back pt immediately but no answer - left message on VM - await call back. Please do not respond to the triage nurse through this encounter. Any subsequent communication should be directly with the patient.

## 2020-10-08 RX ORDER — CELECOXIB 100 MG/1
CAPSULE ORAL
Qty: 60 CAPSULE | Refills: 0 | Status: SHIPPED | OUTPATIENT
Start: 2020-10-08 | End: 2020-11-04 | Stop reason: SDUPTHER

## 2020-11-04 ENCOUNTER — OFFICE VISIT (OUTPATIENT)
Dept: INTERNAL MEDICINE CLINIC | Age: 50
End: 2020-11-04
Payer: COMMERCIAL

## 2020-11-04 VITALS
OXYGEN SATURATION: 99 % | TEMPERATURE: 98.4 F | RESPIRATION RATE: 16 BRPM | BODY MASS INDEX: 40.46 KG/M2 | SYSTOLIC BLOOD PRESSURE: 136 MMHG | HEART RATE: 68 BPM | DIASTOLIC BLOOD PRESSURE: 80 MMHG | WEIGHT: 248.8 LBS

## 2020-11-04 DIAGNOSIS — Z00.00 ENCOUNTER FOR ROUTINE ADULT HEALTH EXAMINATION WITHOUT ABNORMAL FINDINGS: ICD-10-CM

## 2020-11-04 LAB
A/G RATIO: 1.3 (ref 1.1–2.2)
ALBUMIN SERPL-MCNC: 4.3 G/DL (ref 3.4–5)
ALP BLD-CCNC: 95 U/L (ref 40–129)
ALT SERPL-CCNC: 20 U/L (ref 10–40)
ANION GAP SERPL CALCULATED.3IONS-SCNC: 8 MMOL/L (ref 3–16)
AST SERPL-CCNC: 18 U/L (ref 15–37)
BILIRUB SERPL-MCNC: <0.2 MG/DL (ref 0–1)
BUN BLDV-MCNC: 13 MG/DL (ref 7–20)
CALCIUM SERPL-MCNC: 10.8 MG/DL (ref 8.3–10.6)
CHLORIDE BLD-SCNC: 104 MMOL/L (ref 99–110)
CHOLESTEROL, TOTAL: 214 MG/DL (ref 0–199)
CO2: 25 MMOL/L (ref 21–32)
CREAT SERPL-MCNC: 0.7 MG/DL (ref 0.6–1.1)
GFR AFRICAN AMERICAN: >60
GFR NON-AFRICAN AMERICAN: >60
GLOBULIN: 3.2 G/DL
GLUCOSE BLD-MCNC: 92 MG/DL (ref 70–99)
HDLC SERPL-MCNC: 70 MG/DL (ref 40–60)
LDL CHOLESTEROL CALCULATED: 130 MG/DL
POTASSIUM SERPL-SCNC: 4.4 MMOL/L (ref 3.5–5.1)
SODIUM BLD-SCNC: 137 MMOL/L (ref 136–145)
TOTAL PROTEIN: 7.5 G/DL (ref 6.4–8.2)
TRIGL SERPL-MCNC: 68 MG/DL (ref 0–150)
VLDLC SERPL CALC-MCNC: 14 MG/DL

## 2020-11-04 PROCEDURE — 99396 PREV VISIT EST AGE 40-64: CPT | Performed by: INTERNAL MEDICINE

## 2020-11-04 RX ORDER — ATENOLOL 100 MG/1
100 TABLET ORAL DAILY
Qty: 90 TABLET | Refills: 1 | Status: SHIPPED | OUTPATIENT
Start: 2020-11-04 | End: 2021-05-21

## 2020-11-04 RX ORDER — TRIAMTERENE AND HYDROCHLOROTHIAZIDE 75; 50 MG/1; MG/1
TABLET ORAL
Qty: 135 TABLET | Refills: 0 | Status: SHIPPED | OUTPATIENT
Start: 2020-11-04 | End: 2021-09-01 | Stop reason: SDUPTHER

## 2020-11-04 RX ORDER — CETIRIZINE HYDROCHLORIDE 10 MG/1
10 TABLET ORAL DAILY
Qty: 90 TABLET | Refills: 3 | Status: SHIPPED | OUTPATIENT
Start: 2020-11-04 | End: 2021-09-01 | Stop reason: SDUPTHER

## 2020-11-04 RX ORDER — IRBESARTAN 150 MG/1
TABLET ORAL
Qty: 90 TABLET | Refills: 1 | Status: SHIPPED | OUTPATIENT
Start: 2020-11-04 | End: 2021-06-18

## 2020-11-04 RX ORDER — CELECOXIB 100 MG/1
CAPSULE ORAL
Qty: 60 CAPSULE | Refills: 0 | Status: SHIPPED | OUTPATIENT
Start: 2020-11-04 | End: 2021-09-01 | Stop reason: SDUPTHER

## 2020-11-04 SDOH — HEALTH STABILITY: PHYSICAL HEALTH: ON AVERAGE, HOW MANY MINUTES DO YOU ENGAGE IN EXERCISE AT THIS LEVEL?: 20 MIN

## 2020-11-04 SDOH — HEALTH STABILITY: MENTAL HEALTH
STRESS IS WHEN SOMEONE FEELS TENSE, NERVOUS, ANXIOUS, OR CAN'T SLEEP AT NIGHT BECAUSE THEIR MIND IS TROUBLED. HOW STRESSED ARE YOU?: NOT AT ALL

## 2020-11-04 SDOH — HEALTH STABILITY: PHYSICAL HEALTH: ON AVERAGE, HOW MANY DAYS PER WEEK DO YOU ENGAGE IN MODERATE TO STRENUOUS EXERCISE (LIKE A BRISK WALK)?: 7 DAYS

## 2020-11-04 ASSESSMENT — ENCOUNTER SYMPTOMS
RHINORRHEA: 1
SINUS PAIN: 0
TROUBLE SWALLOWING: 0

## 2020-11-04 ASSESSMENT — PATIENT HEALTH QUESTIONNAIRE - PHQ9
SUM OF ALL RESPONSES TO PHQ9 QUESTIONS 1 & 2: 0
SUM OF ALL RESPONSES TO PHQ QUESTIONS 1-9: 0
1. LITTLE INTEREST OR PLEASURE IN DOING THINGS: 0
2. FEELING DOWN, DEPRESSED OR HOPELESS: 0
SUM OF ALL RESPONSES TO PHQ QUESTIONS 1-9: 0
SUM OF ALL RESPONSES TO PHQ QUESTIONS 1-9: 0

## 2020-11-04 NOTE — PATIENT INSTRUCTIONS
always ask your healthcare professional. [place] [address] any warranty or liability for your use of this information. Patient Education        Well Visit, Ages 25 to 48: Care Instructions  Your Care Instructions     Physical exams can help you stay healthy. Your doctor has checked your overall health and may have suggested ways to take good care of yourself. He or she also may have recommended tests. At home, you can help prevent illness with healthy eating, regular exercise, and other steps. Follow-up care is a key part of your treatment and safety. Be sure to make and go to all appointments, and call your doctor if you are having problems. It's also a good idea to know your test results and keep a list of the medicines you take. How can you care for yourself at home? · Reach and stay at a healthy weight. This will lower your risk for many problems, such as obesity, diabetes, heart disease, and high blood pressure. · Get at least 30 minutes of physical activity on most days of the week. Walking is a good choice. You also may want to do other activities, such as running, swimming, cycling, or playing tennis or team sports. Discuss any changes in your exercise program with your doctor. · Do not smoke or allow others to smoke around you. If you need help quitting, talk to your doctor about stop-smoking programs and medicines. These can increase your chances of quitting for good. · Talk to your doctor about whether you have any risk factors for sexually transmitted infections (STIs). Having one sex partner (who does not have STIs and does not have sex with anyone else) is a good way to avoid these infections. · Use birth control if you do not want to have children at this time. Talk with your doctor about the choices available and what might be best for you. · Protect your skin from too much sun.  When you're outdoors from 10 a.m. to 4 p.m., stay in the shade or cover up with clothing and a hat with a wide brim. Wear sunglasses that block UV rays. Even when it's cloudy, put broad-spectrum sunscreen (SPF 30 or higher) on any exposed skin. · See a dentist one or two times a year for checkups and to have your teeth cleaned. · Wear a seat belt in the car. Follow your doctor's advice about when to have certain tests. These tests can spot problems early. For everyone  · Cholesterol. Have the fat (cholesterol) in your blood tested after age 21. Your doctor will tell you how often to have this done based on your age, family history, or other things that can increase your risk for heart disease. · Blood pressure. Have your blood pressure checked during a routine doctor visit. Your doctor will tell you how often to check your blood pressure based on your age, your blood pressure results, and other factors. · Vision. Talk with your doctor about how often to have a glaucoma test.  · Diabetes. Ask your doctor whether you should have tests for diabetes. · Colon cancer. Your risk for colorectal cancer gets higher as you get older. Some experts say that adults should start regular screening at age 48 and stop at age 76. Others say to start before age 48 or continue after age 76. Talk with your doctor about your risk and when to start and stop screening. For women  · Breast exam and mammogram. Talk to your doctor about when you should have a clinical breast exam and a mammogram. Medical experts differ on whether and how often women under 50 should have these tests. Your doctor can help you decide what is right for you. · Cervical cancer screening test and pelvic exam. Begin with a Pap test at age 24. The test often is part of a pelvic exam. Starting at age 27, you may choose to have a Pap test, an HPV test, or both tests at the same time (called co-testing). Talk with your doctor about how often to have testing. · Tests for sexually transmitted infections (STIs).  Ask whether you should have tests for

## 2020-11-04 NOTE — PROGRESS NOTES
Subjective:      Patient ID: Ashleigh Cooper is a 53 y.o. female. Chief Complaint   Patient presents with    Annual Exam     annual visit        HTN       refill meds        HPI    Ashleigh Cooper is a 53 y.o. female and is here for a comprehensive physical exam.  The patient reports no problems     History:  LMP: No LMP recorded. Patient is postmenopausal.  Last pap date: 2017  Abnormal pap? no  Para:3      Do you take any herbs or supplements that were not prescribed by a doctor? Yes- multivitamin, vitamin C, vitamin D, and fish oil    Review of Systems  Do you have pain that bothers you in your daily life? no  Pertinent items are noted in HPI. Hypertension:  Home blood pressure monitoring: No.  She is adherent to a low sodium diet. Patient denies chest pain, shortness of breath, headache, lightheadedness and blurred vision. Antihypertensive medication side effects: no medication side effects noted. Use of agents associated with hypertension: none. Sodium (mmol/L)   Date Value   09/24/2019 143    BUN (mg/dL)   Date Value   09/24/2019 17    Glucose (mg/dL)   Date Value   09/24/2019 88      Potassium (mmol/L)   Date Value   09/24/2019 4.1    CREATININE (mg/dL)   Date Value   09/24/2019 0.7         Review of Systems   Constitutional: Negative. HENT: Positive for rhinorrhea. Negative for postnasal drip, sinus pain, tinnitus and trouble swallowing. All other systems reviewed and are negative.         No Known Allergies    Current Outpatient Medications   Medication Sig Dispense Refill    celecoxib (CELEBREX) 100 MG capsule TAKE 1 CAPSULE BY MOUTH TWICE DAILY AS NEEDED FOR KNEE PAIN 60 capsule 0    irbesartan (AVAPRO) 150 MG tablet TAKE 1 TABLET BY MOUTH EVERY NIGHT 90 tablet 1    atenolol (TENORMIN) 100 MG tablet Take 1 tablet by mouth daily 90 tablet 1    triamterene-hydrochlorothiazide (MAXZIDE) 75-50 MG per tablet TAKE 1/2 TABLET BY MOUTH EVERY  tablet 0    aspirin 81 MG tablet Take 81 mg by mouth daily      Cholecalciferol (VITAMIN D3) 5000 units TABS Take by mouth      ferrous sulfate (FEROSUL) 325 (65 Fe) MG tablet TAKE 1 TABLET BY MOUTH TWICE DAILY (Patient not taking: Reported on 7/1/2020) 180 tablet 0     No current facility-administered medications for this visit. Vitals:    11/04/20 0940 11/04/20 0944   BP: (!) 146/84 136/80   Pulse: 68    Resp: 16    Temp: 98.4 °F (36.9 °C)    TempSrc: Temporal    SpO2: 99%    Weight: 248 lb 12.8 oz (112.9 kg)      Body mass index is 40.46 kg/m². Wt Readings from Last 3 Encounters:   11/04/20 248 lb 12.8 oz (112.9 kg)   09/24/19 252 lb 9.6 oz (114.6 kg)   02/06/19 257 lb (116.6 kg)     BP Readings from Last 3 Encounters:   11/04/20 136/80   09/24/19 130/82   02/06/19 120/76       Objective:   Physical Exam  Vitals signs and nursing note reviewed. Constitutional:       General: She is not in acute distress. Appearance: Normal appearance. She is well-developed. She is obese. She is not diaphoretic. HENT:      Head: Normocephalic and atraumatic. Right Ear: Hearing, tympanic membrane, ear canal and external ear normal.      Left Ear: Hearing, tympanic membrane, ear canal and external ear normal.      Nose: No nasal deformity, laceration, mucosal edema, congestion or rhinorrhea. Right Sinus: No maxillary sinus tenderness or frontal sinus tenderness. Left Sinus: No maxillary sinus tenderness or frontal sinus tenderness. Mouth/Throat:      Mouth: Mucous membranes are moist.      Pharynx: Oropharynx is clear. Uvula midline. No oropharyngeal exudate. Eyes:      General: Lids are normal.         Right eye: No discharge. Left eye: No discharge. Conjunctiva/sclera: Conjunctivae normal.      Pupils: Pupils are equal, round, and reactive to light. Neck:      Musculoskeletal: Full passive range of motion without pain, normal range of motion and neck supple.       Thyroid: No thyroid mass or thyromegaly. Vascular: Normal carotid pulses. No carotid bruit, hepatojugular reflux or JVD. Trachea: Trachea and phonation normal.   Cardiovascular:      Rate and Rhythm: Normal rate and regular rhythm. Chest Wall: PMI is not displaced. Pulses: Normal pulses. Carotid pulses are 2+ on the right side and 2+ on the left side. Radial pulses are 2+ on the right side and 2+ on the left side. Heart sounds: Normal heart sounds, S1 normal and S2 normal.   Pulmonary:      Effort: Pulmonary effort is normal. No respiratory distress. Breath sounds: Normal breath sounds. No decreased breath sounds, wheezing or rhonchi. Abdominal:      General: Bowel sounds are normal. There is no distension. Palpations: Abdomen is soft. There is no mass. Tenderness: There is no abdominal tenderness. There is no guarding or rebound. Comments: No HSM   Musculoskeletal: Normal range of motion. Right shoulder: Normal.      Left shoulder: Normal.      Right hip: Normal.      Left hip: Normal.      Right knee: Normal.      Left knee: Normal.   Lymphadenopathy:      Head:      Right side of head: No submental, submandibular, tonsillar, preauricular, posterior auricular or occipital adenopathy. Left side of head: No submental, submandibular, tonsillar, preauricular, posterior auricular or occipital adenopathy. Cervical: No cervical adenopathy. Right cervical: No superficial, deep or posterior cervical adenopathy. Left cervical: No superficial, deep or posterior cervical adenopathy. Skin:     General: Skin is warm. Capillary Refill: Capillary refill takes less than 2 seconds. Nails: There is no clubbing. Neurological:      General: No focal deficit present. Mental Status: She is alert and oriented to person, place, and time. Mental status is at baseline. GCS: GCS eye subscore is 4. GCS verbal subscore is 5.  GCS motor subscore is 6.      Cranial Nerves: No cranial nerve deficit. Sensory: No sensory deficit. Deep Tendon Reflexes: Reflexes are normal and symmetric. Reflex Scores:       Tricep reflexes are 2+ on the right side and 2+ on the left side. Bicep reflexes are 2+ on the right side and 2+ on the left side. Psychiatric:         Mood and Affect: Mood normal.         Speech: Speech normal.         Behavior: Behavior normal. Behavior is not slowed. Judgment: Judgment normal.         Assessment/Plan:     Niesha Cunningham was seen today for annual exam.    Diagnoses and all orders for this visit:    Encounter for routine adult health examination without abnormal findings  - Anticipatory Guidance  Injury Prevention  Lap-shoulder belts, Smoke detectors, Carbon monoxide detectors, Safe storage and handling of firearms; removal if appropriate and  Occupational risk counseling  Substance Abuse  1. Tobacco cessation or never starting to include pharmacotherapy, social support for cessation, and skills training/problem solving. Avoid alcohol/drug use while driving, swimming, boating, using firearms, etc.   Sexual Behavior  1. STD prevention; abstinence; avoid high-risk behavior; condoms/female barrier with spermicide,  Contraception   Diet and Exercise   Limit fat and cholesterol; maintain caloric balance; emphasized grains, fruits and vegetables. Adequate calcium and vitamin D intake (females); add foods rich in calcium; supplement as needed.  Regular physical activity at least 150 minutes per week to maintain activity   Protection from UV Light  Abuse and Violence: violence prevention at home, school and in social situations  Dental Health: Regular visits to dental health provider      HTN, goal below 140/90  - well controlled  Primary osteoarthritis of both knees  - knee pain is stable  Colon cancer screening  - referral complete  Allergic rhinitis, unspecified seasonality, unspecified trigger  - Zyrtec      Prasanth Ivy Vince Nicholas MD

## 2020-11-05 LAB
ESTIMATED AVERAGE GLUCOSE: 111.2 MG/DL
HBA1C MFR BLD: 5.5 %

## 2020-11-13 NOTE — TELEPHONE ENCOUNTER
Pt called to enter new pharmacy information.   She is now using [facility] on [facility]

## 2021-05-20 DIAGNOSIS — I10 HTN, GOAL BELOW 140/90: ICD-10-CM

## 2021-05-21 RX ORDER — ATENOLOL 100 MG/1
TABLET ORAL
Qty: 90 TABLET | Refills: 0 | Status: SHIPPED | OUTPATIENT
Start: 2021-05-21 | End: 2021-09-01 | Stop reason: SDUPTHER

## 2021-05-26 ENCOUNTER — HOSPITAL ENCOUNTER (OUTPATIENT)
Dept: WOMENS IMAGING | Age: 51
Discharge: HOME | End: 2021-05-26
Payer: COMMERCIAL

## 2021-05-26 DIAGNOSIS — Z12.11 COLON CANCER SCREENING: ICD-10-CM

## 2021-05-26 PROCEDURE — 77067 SCR MAMMO BI INCL CAD: CPT

## 2021-06-14 ENCOUNTER — TELEPHONE (OUTPATIENT)
Dept: INTERNAL MEDICINE CLINIC | Age: 51
End: 2021-06-14

## 2021-06-14 NOTE — TELEPHONE ENCOUNTER
----- Message from Edouard Albert sent at 6/14/2021  3:43 PM EDT -----  Subject: Message to Provider    QUESTIONS  Information for Provider? Patient is scheduled with Dr. Jessee Austin on   9/1/2021 for a 6 month check up, for medication. Would like to be   notified, by phone if any appointments in the morning, on Monday, Tuesday,   or Wednesday open up.  ---------------------------------------------------------------------------  --------------  [address]  What is the best way for the office to contact you? OK to leave message on   voicemail  Preferred Call Back Phone Number? [phone]  ---------------------------------------------------------------------------  --------------  SCRIPT ANSWERS  Relationship to Patient?  Self

## 2021-06-17 DIAGNOSIS — I10 HTN, GOAL BELOW 140/90: ICD-10-CM

## 2021-06-18 RX ORDER — IRBESARTAN 150 MG/1
TABLET ORAL
Qty: 30 TABLET | Refills: 0 | Status: SHIPPED | OUTPATIENT
Start: 2021-06-18 | End: 2021-07-30

## 2021-07-30 DIAGNOSIS — I10 HTN, GOAL BELOW 140/90: ICD-10-CM

## 2021-07-30 RX ORDER — IRBESARTAN 150 MG/1
TABLET ORAL
Qty: 30 TABLET | Refills: 0 | Status: SHIPPED | OUTPATIENT
Start: 2021-07-30 | End: 2021-09-01 | Stop reason: SDUPTHER

## 2021-09-01 ENCOUNTER — OFFICE VISIT (OUTPATIENT)
Dept: INTERNAL MEDICINE CLINIC | Age: 51
End: 2021-09-01
Payer: COMMERCIAL

## 2021-09-01 VITALS
OXYGEN SATURATION: 98 % | DIASTOLIC BLOOD PRESSURE: 80 MMHG | SYSTOLIC BLOOD PRESSURE: 126 MMHG | BODY MASS INDEX: 37.93 KG/M2 | WEIGHT: 236 LBS | HEIGHT: 66 IN | HEART RATE: 75 BPM

## 2021-09-01 DIAGNOSIS — M17.0 PRIMARY OSTEOARTHRITIS OF BOTH KNEES: ICD-10-CM

## 2021-09-01 DIAGNOSIS — J30.9 ALLERGIC RHINITIS, UNSPECIFIED SEASONALITY, UNSPECIFIED TRIGGER: ICD-10-CM

## 2021-09-01 DIAGNOSIS — K21.9 CHRONIC GERD: ICD-10-CM

## 2021-09-01 DIAGNOSIS — I10 HTN, GOAL BELOW 140/90: Primary | ICD-10-CM

## 2021-09-01 DIAGNOSIS — Z12.11 SCREEN FOR COLON CANCER: ICD-10-CM

## 2021-09-01 DIAGNOSIS — Z13.9 ENCOUNTER FOR HEALTH-RELATED SCREENING: ICD-10-CM

## 2021-09-01 PROCEDURE — 99214 OFFICE O/P EST MOD 30 MIN: CPT | Performed by: INTERNAL MEDICINE

## 2021-09-01 RX ORDER — OMEPRAZOLE 20 MG/1
20 CAPSULE, DELAYED RELEASE ORAL DAILY
Qty: 30 CAPSULE | Refills: 3 | Status: SHIPPED | OUTPATIENT
Start: 2021-09-01 | End: 2021-12-01 | Stop reason: SDUPTHER

## 2021-09-01 RX ORDER — CELECOXIB 100 MG/1
CAPSULE ORAL
Qty: 60 CAPSULE | Refills: 0 | Status: SHIPPED | OUTPATIENT
Start: 2021-09-01 | End: 2021-12-01

## 2021-09-01 RX ORDER — IRBESARTAN 150 MG/1
150 TABLET ORAL DAILY
Qty: 90 TABLET | Refills: 1 | Status: SHIPPED | OUTPATIENT
Start: 2021-09-01 | End: 2021-12-01 | Stop reason: SDUPTHER

## 2021-09-01 RX ORDER — CETIRIZINE HYDROCHLORIDE 10 MG/1
10 TABLET ORAL DAILY
Qty: 90 TABLET | Refills: 3 | Status: SHIPPED | OUTPATIENT
Start: 2021-09-01 | End: 2021-12-01 | Stop reason: SDUPTHER

## 2021-09-01 RX ORDER — TRIAMTERENE AND HYDROCHLOROTHIAZIDE 75; 50 MG/1; MG/1
TABLET ORAL
Qty: 135 TABLET | Refills: 0 | Status: SHIPPED | OUTPATIENT
Start: 2021-09-01 | End: 2021-12-01 | Stop reason: SDUPTHER

## 2021-09-01 RX ORDER — ATENOLOL 100 MG/1
100 TABLET ORAL DAILY
Qty: 90 TABLET | Refills: 1 | Status: SHIPPED | OUTPATIENT
Start: 2021-09-01 | End: 2021-12-01 | Stop reason: SDUPTHER

## 2021-09-01 SDOH — ECONOMIC STABILITY: FOOD INSECURITY: WITHIN THE PAST 12 MONTHS, YOU WORRIED THAT YOUR FOOD WOULD RUN OUT BEFORE YOU GOT MONEY TO BUY MORE.: NEVER TRUE

## 2021-09-01 SDOH — ECONOMIC STABILITY: FOOD INSECURITY: WITHIN THE PAST 12 MONTHS, THE FOOD YOU BOUGHT JUST DIDN'T LAST AND YOU DIDN'T HAVE MONEY TO GET MORE.: NEVER TRUE

## 2021-09-01 ASSESSMENT — PATIENT HEALTH QUESTIONNAIRE - PHQ9
1. LITTLE INTEREST OR PLEASURE IN DOING THINGS: 0
2. FEELING DOWN, DEPRESSED OR HOPELESS: 0
SUM OF ALL RESPONSES TO PHQ QUESTIONS 1-9: 0
SUM OF ALL RESPONSES TO PHQ9 QUESTIONS 1 & 2: 0

## 2021-09-01 ASSESSMENT — SOCIAL DETERMINANTS OF HEALTH (SDOH): HOW HARD IS IT FOR YOU TO PAY FOR THE VERY BASICS LIKE FOOD, HOUSING, MEDICAL CARE, AND HEATING?: NOT HARD AT ALL

## 2021-09-01 NOTE — PROGRESS NOTES
Subjective:      Patient ID: Phillip Blue is a 49 y.o. female. Chief Complaint   Patient presents with    Hypertension       Hypertension  This is a chronic problem. The current episode started more than 1 year ago. The problem is unchanged. The problem is controlled. Pertinent negatives include no anxiety, blurred vision, chest pain, headaches, malaise/fatigue, neck pain, orthopnea, palpitations, peripheral edema, PND or shortness of breath. There are no associated agents to hypertension. Risk factors for coronary artery disease include obesity and sedentary lifestyle. Past treatments include beta blockers and diuretics. The current treatment provides significant improvement. There are no compliance problems. There is no history of angina, kidney disease, CAD/MI, CVA, heart failure, left ventricular hypertrophy, PVD or retinopathy. Patient also complains of knee pain. Pain is worse with going up stairs and walking long distances. Patient denies knees giving way. But does complain of pain at night. Patient also with a history of heartburn. States the omeprazole helps her heartburn. She denies nausea vomiting or regurgitation. Review of Systems   Constitutional: Negative for malaise/fatigue. Eyes: Negative for blurred vision. Respiratory: Negative for shortness of breath. Cardiovascular: Negative for chest pain, palpitations, orthopnea and PND. Musculoskeletal: Negative for neck pain. Neurological: Negative for headaches. All other systems reviewed and are negative.           Current Outpatient Medications   Medication Sig Dispense Refill    irbesartan (AVAPRO) 150 MG tablet Take 1 tablet by mouth in the evening 30 tablet 0    atenolol (TENORMIN) 100 MG tablet Take 1 tablet by mouth once daily 90 tablet 0    triamterene-hydroCHLOROthiazide (MAXZIDE) 75-50 MG per tablet TAKE 1/2 TABLET BY MOUTH EVERY  tablet 0    celecoxib (CELEBREX) 100 MG capsule TAKE 1 CAPSULE BY MOUTH TWICE DAILY AS NEEDED FOR KNEE PAIN 60 capsule 0    cetirizine (ZYRTEC ALLERGY) 10 MG tablet Take 1 tablet by mouth daily 90 tablet 3    aspirin 81 MG tablet Take 81 mg by mouth daily      Cholecalciferol (VITAMIN D3) 5000 units TABS Take by mouth       No current facility-administered medications for this visit. Vitals:    09/01/21 1101   BP: 126/80   Pulse: 75   SpO2: 98%   Weight: 236 lb (107 kg)   Height: 5' 5.75\" (1.67 m)     Body mass index is 38.38 kg/m². Wt Readings from Last 3 Encounters:   09/01/21 236 lb (107 kg)   11/04/20 248 lb 12.8 oz (112.9 kg)   09/24/19 252 lb 9.6 oz (114.6 kg)     BP Readings from Last 3 Encounters:   09/01/21 126/80   11/04/20 136/80   09/24/19 130/82       Objective:   Physical Exam  Vitals and nursing note reviewed. Constitutional:       Appearance: She is well-developed. HENT:      Head: Normocephalic and atraumatic. Right Ear: External ear normal.      Left Ear: External ear normal.      Nose: Nose normal.   Eyes:      Conjunctiva/sclera: Conjunctivae normal.      Pupils: Pupils are equal, round, and reactive to light. Neck:      Thyroid: No thyromegaly. Cardiovascular:      Rate and Rhythm: Normal rate and regular rhythm. Heart sounds: Normal heart sounds. Pulmonary:      Effort: Pulmonary effort is normal. No respiratory distress. Breath sounds: Normal breath sounds. Abdominal:      General: Bowel sounds are normal. There is no distension. Palpations: Abdomen is soft. Musculoskeletal:         General: Normal range of motion. Cervical back: Normal range of motion and neck supple. Skin:     General: Skin is warm. Neurological:      Mental Status: She is alert and oriented to person, place, and time. Psychiatric:         Behavior: Behavior normal.         Thought Content:  Thought content normal.         Judgment: Judgment normal.           The 10-year ASCVD risk score (Erick Graham et al., 2013) is: 2.5%    Values used

## 2021-09-17 ASSESSMENT — ENCOUNTER SYMPTOMS
SHORTNESS OF BREATH: 0
ORTHOPNEA: 0
BLURRED VISION: 0

## 2021-11-23 DIAGNOSIS — Z13.9 ENCOUNTER FOR HEALTH-RELATED SCREENING: ICD-10-CM

## 2021-11-23 LAB
A/G RATIO: 1.1 (ref 1.1–2.2)
ALBUMIN SERPL-MCNC: 4.2 G/DL (ref 3.4–5)
ALP BLD-CCNC: 90 U/L (ref 40–129)
ALT SERPL-CCNC: 14 U/L (ref 10–40)
ANION GAP SERPL CALCULATED.3IONS-SCNC: 12 MMOL/L (ref 3–16)
AST SERPL-CCNC: 21 U/L (ref 15–37)
BILIRUB SERPL-MCNC: <0.2 MG/DL (ref 0–1)
BUN BLDV-MCNC: 11 MG/DL (ref 7–20)
CALCIUM SERPL-MCNC: 10.6 MG/DL (ref 8.3–10.6)
CHLORIDE BLD-SCNC: 106 MMOL/L (ref 99–110)
CHOLESTEROL, TOTAL: 187 MG/DL (ref 0–199)
CO2: 22 MMOL/L (ref 21–32)
CREAT SERPL-MCNC: 0.7 MG/DL (ref 0.6–1.1)
GFR AFRICAN AMERICAN: >60
GFR NON-AFRICAN AMERICAN: >60
GLUCOSE BLD-MCNC: 91 MG/DL (ref 70–99)
HDLC SERPL-MCNC: 64 MG/DL (ref 40–60)
LDL CHOLESTEROL CALCULATED: 111 MG/DL
POTASSIUM SERPL-SCNC: 4.1 MMOL/L (ref 3.5–5.1)
SODIUM BLD-SCNC: 140 MMOL/L (ref 136–145)
TOTAL PROTEIN: 7.9 G/DL (ref 6.4–8.2)
TRIGL SERPL-MCNC: 61 MG/DL (ref 0–150)
TSH REFLEX FT4: 1.26 UIU/ML (ref 0.27–4.2)
VLDLC SERPL CALC-MCNC: 12 MG/DL

## 2021-11-24 LAB
ESTIMATED AVERAGE GLUCOSE: 111.2 MG/DL
HBA1C MFR BLD: 5.5 %

## 2021-12-01 ENCOUNTER — OFFICE VISIT (OUTPATIENT)
Dept: INTERNAL MEDICINE CLINIC | Age: 51
End: 2021-12-01
Payer: COMMERCIAL

## 2021-12-01 VITALS
SYSTOLIC BLOOD PRESSURE: 110 MMHG | HEART RATE: 83 BPM | BODY MASS INDEX: 40.05 KG/M2 | OXYGEN SATURATION: 98 % | HEIGHT: 65 IN | WEIGHT: 240.4 LBS | TEMPERATURE: 97.2 F | DIASTOLIC BLOOD PRESSURE: 80 MMHG

## 2021-12-01 DIAGNOSIS — Z11.52 ENCOUNTER FOR SCREENING FOR COVID-19: Primary | ICD-10-CM

## 2021-12-01 DIAGNOSIS — J30.9 ALLERGIC RHINITIS, UNSPECIFIED SEASONALITY, UNSPECIFIED TRIGGER: ICD-10-CM

## 2021-12-01 DIAGNOSIS — I10 HTN, GOAL BELOW 140/90: ICD-10-CM

## 2021-12-01 DIAGNOSIS — K21.9 CHRONIC GERD: ICD-10-CM

## 2021-12-01 PROCEDURE — 99214 OFFICE O/P EST MOD 30 MIN: CPT | Performed by: INTERNAL MEDICINE

## 2021-12-01 RX ORDER — ATENOLOL 100 MG/1
100 TABLET ORAL DAILY
Qty: 90 TABLET | Refills: 1 | Status: SHIPPED | OUTPATIENT
Start: 2021-12-01 | End: 2022-05-04 | Stop reason: SDUPTHER

## 2021-12-01 RX ORDER — TRIAMTERENE AND HYDROCHLOROTHIAZIDE 75; 50 MG/1; MG/1
TABLET ORAL
Qty: 135 TABLET | Refills: 0 | Status: SHIPPED | OUTPATIENT
Start: 2021-12-01 | End: 2022-01-23

## 2021-12-01 RX ORDER — IRBESARTAN 150 MG/1
150 TABLET ORAL DAILY
Qty: 90 TABLET | Refills: 1 | Status: SHIPPED | OUTPATIENT
Start: 2021-12-01 | End: 2022-04-06

## 2021-12-01 RX ORDER — CETIRIZINE HYDROCHLORIDE 10 MG/1
10 TABLET ORAL DAILY
Qty: 90 TABLET | Refills: 3 | Status: SHIPPED | OUTPATIENT
Start: 2021-12-01 | End: 2022-03-07

## 2021-12-01 RX ORDER — OMEPRAZOLE 20 MG/1
20 CAPSULE, DELAYED RELEASE ORAL DAILY
Qty: 90 CAPSULE | Refills: 1 | Status: SHIPPED | OUTPATIENT
Start: 2021-12-01 | End: 2022-05-04 | Stop reason: SDUPTHER

## 2021-12-01 SDOH — HEALTH STABILITY: PHYSICAL HEALTH: ON AVERAGE, HOW MANY DAYS PER WEEK DO YOU ENGAGE IN MODERATE TO STRENUOUS EXERCISE (LIKE A BRISK WALK)?: 5 DAYS

## 2021-12-01 SDOH — ECONOMIC STABILITY: INCOME INSECURITY: IN THE LAST 12 MONTHS, WAS THERE A TIME WHEN YOU WERE NOT ABLE TO PAY THE MORTGAGE OR RENT ON TIME?: NO

## 2021-12-01 SDOH — ECONOMIC STABILITY: HOUSING INSECURITY
IN THE LAST 12 MONTHS, WAS THERE A TIME WHEN YOU DID NOT HAVE A STEADY PLACE TO SLEEP OR SLEPT IN A SHELTER (INCLUDING NOW)?: NO

## 2021-12-01 SDOH — HEALTH STABILITY: PHYSICAL HEALTH: ON AVERAGE, HOW MANY MINUTES DO YOU ENGAGE IN EXERCISE AT THIS LEVEL?: 40 MIN

## 2021-12-01 ASSESSMENT — SOCIAL DETERMINANTS OF HEALTH (SDOH)
IN A TYPICAL WEEK, HOW MANY TIMES DO YOU TALK ON THE PHONE WITH FAMILY, FRIENDS, OR NEIGHBORS?: MORE THAN THREE TIMES A WEEK
HOW OFTEN DO YOU ATTENT MEETINGS OF THE CLUB OR ORGANIZATION YOU BELONG TO?: MORE THAN 4 TIMES PER YEAR
WITHIN THE LAST YEAR, HAVE YOU BEEN HUMILIATED OR EMOTIONALLY ABUSED IN OTHER WAYS BY YOUR PARTNER OR EX-PARTNER?: NO
WITHIN THE LAST YEAR, HAVE TO BEEN RAPED OR FORCED TO HAVE ANY KIND OF SEXUAL ACTIVITY BY YOUR PARTNER OR EX-PARTNER?: NO
WITHIN THE LAST YEAR, HAVE YOU BEEN AFRAID OF YOUR PARTNER OR EX-PARTNER?: NO
WITHIN THE LAST YEAR, HAVE YOU BEEN KICKED, HIT, SLAPPED, OR OTHERWISE PHYSICALLY HURT BY YOUR PARTNER OR EX-PARTNER?: NO
HOW OFTEN DO YOU GET TOGETHER WITH FRIENDS OR RELATIVES?: MORE THAN THREE TIMES A WEEK
DO YOU BELONG TO ANY CLUBS OR ORGANIZATIONS SUCH AS CHURCH GROUPS UNIONS, FRATERNAL OR ATHLETIC GROUPS, OR SCHOOL GROUPS?: NO
HOW OFTEN DO YOU ATTEND CHURCH OR RELIGIOUS SERVICES?: NEVER

## 2021-12-01 ASSESSMENT — LIFESTYLE VARIABLES
HOW OFTEN DO YOU HAVE A DRINK CONTAINING ALCOHOL: NEVER
HOW MANY STANDARD DRINKS CONTAINING ALCOHOL DO YOU HAVE ON A TYPICAL DAY: 1 OR 2

## 2021-12-01 ASSESSMENT — ENCOUNTER SYMPTOMS
RHINORRHEA: 1
TROUBLE SWALLOWING: 0
SINUS PAIN: 0

## 2021-12-01 NOTE — PATIENT INSTRUCTIONS
given at the same time as other vaccines. Talk with your health care provider  Tell your vaccine provider if the person getting the vaccine:  · Has had an allergic reaction after a previous dose of live shingles vaccine or varicella vaccine, or has any severe, life-threatening allergies. · Has a weakened immune system. · Is pregnant or thinks she might be pregnant. · Is currently experiencing an episode of shingles. In some cases, your health care provider may decide to postpone shingles vaccination to a future visit. People with minor illnesses, such as a cold, may be vaccinated. People who are moderately or severely ill should usually wait until they recover before getting live shingles vaccine. Your health care provider can give you more information. Risks of a vaccine reaction  · Redness, soreness, swelling, or itching at the site of the injection and headache can happen after live shingles vaccine. Rarely, live shingles vaccine can cause rash or shingles. People sometimes faint after medical procedures, including vaccination. Tell your provider if you feel dizzy or have vision changes or ringing in the ears. As with any medicine, there is a very remote chance of a vaccine causing a severe allergic reaction, other serious injury, or death. What if there is a serious problem? An allergic reaction could occur after the vaccinated person leaves the clinic. If you see signs of a severe allergic reaction (hives, swelling of the face and throat, difficulty breathing, a fast heartbeat, dizziness, or weakness), call 9-1-1 and get the person to the nearest hospital.  For other signs that concern you, call your health care provider. Adverse reactions should be reported to the Vaccine Adverse Event Reporting System (VAERS). Your health care provider will usually file this report, or you can do it yourself. Visit the VAERS website at www.vaers. hhs.gov or call 7-905.476.1410. VAERS is only for reporting reactions, and [facility] staff do not give medical advice. How can I learn more? · Ask your health care provider. · Call your local or state health department. · Contact the Centers for Disease Control and Prevention (CDC):  ? Call 3-143.655.2820 (1-800-CDC-INFO) or  ? Visit CDC's website at www.cdc.gov/vaccines  Vaccine Information Statement  Live Zoster Vaccine  10/30/2019  [facility] and Pending sale to [facility] for Disease Control and Prevention  Many Vaccine Information Statements are available in Danish and other languages. See www.immunize.org/vis   Hojas de Información Sobre Vacunas están disponibles en Español y en muchos otros idiomas. [email or web address]   Care instructions adapted under license by [facility] ([facility]). If you have questions about a medical condition or this instruction, always ask your healthcare professional. [place] [address] any warranty or liability for your use of this information. Patient Education        Recombinant Zoster (Shingles) Vaccine: What You Need to Know  Why get vaccinated? Recombinant zoster (shingles) vaccine can prevent shingles. Shingles (also called herpes zoster, or just zoster) is a painful skin rash, usually with blisters. In addition to the rash, shingles can cause fever, headache, chills, or upset stomach. More rarely, shingles can lead to pneumonia, hearing problems, blindness, brain inflammation (encephalitis), or death. The most common complication of shingles is long-term nerve pain called postherpetic neuralgia (PHN). PHN occurs in the areas where the shingles rash was, even after the rash clears up. It can last for months or years after the rash goes away. The pain from PHN can be severe and debilitating. About 10 to 18% of people who get shingles will experience PHN. The risk of PHN increases with age.  An older adult with shingles is more likely to develop PHN and have longer lasting and more severe pain than a younger person with shingles. Shingles is caused by the varicella zoster virus, the same virus that causes chickenpox. After you have chickenpox, the virus stays in your body and can cause shingles later in life. Shingles cannot be passed from one person to another, but the virus that causes shingles can spread and cause chickenpox in someone who had never had chickenpox or received chickenpox vaccine. Recombinant shingles vaccine  Recombinant shingles vaccine provides strong protection against shingles. By preventing shingles, recombinant shingles vaccine also protects against PHN. Recombinant shingles vaccine is the preferred vaccine for the prevention of shingles. However, a different vaccine, live shingles vaccine, may be used in some circumstances. The recombinant shingles vaccine is recommended for adults 50 years and older without serious immune problems. It is given as a two-dose series. This vaccine is also recommended for people who have already gotten another type of shingles vaccine, the live shingles vaccine. There is no live virus in this vaccine. Shingles vaccine may be given at the same time as other vaccines. Talk with your health care provider  Tell your vaccine provider if the person getting the vaccine:  · Has had an allergic reaction after a previous dose of recombinant shingles vaccine, or has any severe, life-threatening allergies. · Is pregnant or breastfeeding. · Is currently experiencing an episode of shingles. In some cases, your health care provider may decide to postpone shingles vaccination to a future visit. People with minor illnesses, such as a cold, may be vaccinated. People who are moderately or severely ill should usually wait until they recover before getting recombinant shingles vaccine. Your health care provider can give you more information.   Risks of a vaccine reaction  · A sore arm with mild or moderate pain is very common after recombinant shingles vaccine, affecting about 80% of vaccinated people. Redness and swelling can also happen at the site of the injection. · Tiredness, muscle pain, headache, shivering, fever, stomach pain, and nausea happen after vaccination in more than half of people who receive recombinant shingles vaccine. In clinical trials, about 1 out of 6 people who got recombinant zoster vaccine experienced side effects that prevented them from doing regular activities. Symptoms usually went away on their own in 2 to 3 days. You should still get the second dose of recombinant zoster vaccine even if you had one of these reactions after the first dose. People sometimes faint after medical procedures, including vaccination. Tell your provider if you feel dizzy or have vision changes or ringing in the ears. As with any medicine, there is a very remote chance of a vaccine causing a severe allergic reaction, other serious injury, or death. What if there is a serious problem? An allergic reaction could occur after the vaccinated person leaves the clinic. If you see signs of a severe allergic reaction (hives, swelling of the face and throat, difficulty breathing, a fast heartbeat, dizziness, or weakness), call 9-1-1 and get the person to the nearest hospital.  For other signs that concern you, call your health care provider. Adverse reactions should be reported to the Vaccine Adverse Event Reporting System (VAERS). Your health care provider will usually file this report, or you can do it yourself. Visit the VAERS website at www.vaers. hhs.gov or call 1-964.802.9134. VAERS is only for reporting reactions, and VAERS staff do not give medical advice. How can I learn more? · Ask your health care provider. · Call your local or state health department. · Contact the Centers for Disease Control and Prevention (CDC):  ? Call 0-142.543.7649 (1-800-CDC-INFO) or  ?  Visit CDC's website at www.cdc.gov/vaccines  Vaccine Information Statement  Recombinant Zoster Vaccine  10/30/2019  Department of Health and Human Services  Centers for Disease Control and Prevention  Many Vaccine Information Statements are available in Sami and other languages. See www.immunize.org/vis. Hojas de Información Sobre Vacunas están disponibles en Español y en muchos otros idiomas. [email or web address] [email or web address]   Care instructions adapted under license by [facility] ([facility]). If you have questions about a medical condition or this instruction, always ask your healthcare professional. [address] any warranty or liability for your use of this information.

## 2021-12-01 NOTE — PROGRESS NOTES
Subjective:      Patient ID: Shannon Diaz is a 49 y.o. female. Chief Complaint   Patient presents with    Annual Exam     physical- wanted to discuss knee pain       HPI    Shannon Diaz is a 53 y.o. female and is here for a comprehensive physical exam.  The patient reports no problems. Using glucosamine chondroitin for knees     History:  LMP: No LMP recorded. Patient is postmenopausal.  Last pap date: 2017  Abnormal pap? no  Para:3      Do you take any herbs or supplements that were not prescribed by a doctor? Yes- multivitamin, vitamin C, vitamin D, and fish oil    Review of Systems  Do you have pain that bothers you in your daily life? no  Pertinent items are noted in HPI. Hypertension:  Home blood pressure monitoring: No.  She is adherent to a low sodium diet. Patient denies chest pain, shortness of breath, headache, lightheadedness and blurred vision. Antihypertensive medication side effects: no medication side effects noted. Use of agents associated with hypertension: none. Sodium (mmol/L)   Date Value   11/23/2021 140    BUN (mg/dL)   Date Value   11/23/2021 11    Glucose (mg/dL)   Date Value   11/23/2021 91      Potassium (mmol/L)   Date Value   11/23/2021 4.1    CREATININE (mg/dL)   Date Value   11/23/2021 0.7         Review of Systems   Constitutional: Negative. HENT: Positive for rhinorrhea. Negative for postnasal drip, sinus pain, tinnitus and trouble swallowing. All other systems reviewed and are negative.         No Known Allergies    Current Outpatient Medications   Medication Sig Dispense Refill    atenolol (TENORMIN) 100 MG tablet Take 1 tablet by mouth daily 90 tablet 1    cetirizine (ZYRTEC ALLERGY) 10 MG tablet Take 1 tablet by mouth daily 90 tablet 3    irbesartan (AVAPRO) 150 MG tablet Take 1 tablet by mouth daily 90 tablet 1    triamterene-hydroCHLOROthiazide (MAXZIDE) 75-50 MG per tablet TAKE 1/2 TABLET BY MOUTH EVERY  tablet 0    omeprazole (PRILOSEC) 20 MG delayed release capsule Take 1 capsule by mouth daily For heartburn 30 capsule 3    aspirin 81 MG tablet Take 81 mg by mouth daily      Cholecalciferol (VITAMIN D3) 5000 units TABS Take by mouth      celecoxib (CELEBREX) 100 MG capsule TAKE 1 CAPSULE BY MOUTH TWICE DAILY AS NEEDED FOR KNEE PAIN (Patient not taking: Reported on 12/1/2021) 60 capsule 0     No current facility-administered medications for this visit. Vitals:    12/01/21 1128   BP: 110/80   Site: Left Upper Arm   Position: Sitting   Pulse: 83   Temp: 97.2 °F (36.2 °C)   TempSrc: Temporal   SpO2: 98%   Weight: 240 lb 6.4 oz (109 kg)   Height: 5' 5\" (1.651 m)     Body mass index is 40 kg/m². Wt Readings from Last 3 Encounters:   12/01/21 240 lb 6.4 oz (109 kg)   09/01/21 236 lb (107 kg)   11/04/20 248 lb 12.8 oz (112.9 kg)     BP Readings from Last 3 Encounters:   12/01/21 110/80   09/01/21 126/80   11/04/20 136/80       Objective:   Physical Exam  Vitals and nursing note reviewed. Constitutional:       General: She is not in acute distress. Appearance: Normal appearance. She is well-developed. She is obese. She is not diaphoretic. HENT:      Head: Normocephalic and atraumatic. Right Ear: Hearing, tympanic membrane, ear canal and external ear normal.      Left Ear: Hearing, tympanic membrane, ear canal and external ear normal.      Nose: No nasal deformity, laceration, mucosal edema, congestion or rhinorrhea. Right Sinus: No maxillary sinus tenderness or frontal sinus tenderness. Left Sinus: No maxillary sinus tenderness or frontal sinus tenderness. Mouth/Throat:      Mouth: Mucous membranes are moist.      Pharynx: Oropharynx is clear. Uvula midline. No oropharyngeal exudate. Eyes:      General: Lids are normal.         Right eye: No discharge. Left eye: No discharge.       Conjunctiva/sclera: Conjunctivae normal.      Pupils: Pupils are equal, round, and reactive to light. Neck:      Thyroid: No thyroid mass or thyromegaly. Vascular: Normal carotid pulses. No carotid bruit, hepatojugular reflux or JVD. Trachea: Trachea and phonation normal.   Cardiovascular:      Rate and Rhythm: Normal rate and regular rhythm. Chest Wall: PMI is not displaced. Pulses: Normal pulses. Carotid pulses are 2+ on the right side and 2+ on the left side. Radial pulses are 2+ on the right side and 2+ on the left side. Heart sounds: Normal heart sounds, S1 normal and S2 normal.   Pulmonary:      Effort: Pulmonary effort is normal. No respiratory distress. Breath sounds: Normal breath sounds. No decreased breath sounds, wheezing or rhonchi. Abdominal:      General: Bowel sounds are normal. There is no distension. Palpations: Abdomen is soft. There is no mass. Tenderness: There is no abdominal tenderness. There is no guarding or rebound. Comments: No HSM   Musculoskeletal:         General: Normal range of motion. Right shoulder: Normal.      Left shoulder: Normal.      Cervical back: Full passive range of motion without pain, normal range of motion and neck supple. Right hip: Normal.      Left hip: Normal.      Right knee: Normal.      Left knee: Normal.   Lymphadenopathy:      Head:      Right side of head: No submental, submandibular, tonsillar, preauricular, posterior auricular or occipital adenopathy. Left side of head: No submental, submandibular, tonsillar, preauricular, posterior auricular or occipital adenopathy. Cervical: No cervical adenopathy. Right cervical: No superficial, deep or posterior cervical adenopathy. Left cervical: No superficial, deep or posterior cervical adenopathy. Skin:     General: Skin is warm. Capillary Refill: Capillary refill takes less than 2 seconds. Nails: There is no clubbing. Neurological:      General: No focal deficit present.       Mental Status: She is alert and oriented to person, place, and time. Mental status is at baseline. GCS: GCS eye subscore is 4. GCS verbal subscore is 5. GCS motor subscore is 6. Cranial Nerves: No cranial nerve deficit. Sensory: No sensory deficit. Deep Tendon Reflexes: Reflexes are normal and symmetric. Reflex Scores:       Tricep reflexes are 2+ on the right side and 2+ on the left side. Bicep reflexes are 2+ on the right side and 2+ on the left side. Psychiatric:         Mood and Affect: Mood normal.         Speech: Speech normal.         Behavior: Behavior normal. Behavior is not slowed. Judgment: Judgment normal.       11/23/2021: HDL 64 mg/dL (H; Ref range: 40 - 60 mg/dL);  Triglycerides 61 mg/dL (Ref range: 0 - 150 mg/dL)        Lab Results   Component Value Date    CHOL 187 11/23/2021    CHOL 214 (H) 11/04/2020    CHOL 212 (H) 09/24/2019     Lab Results   Component Value Date    TRIG 61 11/23/2021    TRIG 68 11/04/2020    TRIG 77 09/24/2019     Lab Results   Component Value Date    HDL 64 (H) 11/23/2021    HDL 70 (H) 11/04/2020    HDL 64 (H) 09/24/2019     Lab Results   Component Value Date    LDLCALC 111 (H) 11/23/2021    LDLCALC 130 (H) 11/04/2020    LDLCALC 133 (H) 09/24/2019     Lab Results   Component Value Date    LABVLDL 12 11/23/2021    LABVLDL 14 11/04/2020    LABVLDL 15 09/24/2019     No results found for: CHOLHDLRATIO    The 10-year ASCVD risk score (Jeralene Brunner., et al., 2013) is: 1.4%    Values used to calculate the score:      Age: 49 years      Sex: Female      Is Non- : Yes      Diabetic: No      Tobacco smoker: No      Systolic Blood Pressure: 464 mmHg      Is BP treated: Yes      HDL Cholesterol: 64 mg/dL      Total Cholesterol: 187 mg/dL    Hemoglobin A1C   Date Value Ref Range Status   11/23/2021 5.5 See comment % Final     Comment:     Comment:  Diagnosis of Diabetes: > or = 6.5%  Increased risk of diabetes (Prediabetes): 5.7-6.4%  Glycemic Control: Nonpregnant Adults: <7.0%                    Pregnant: <6.0%           Assessment/Plan:     Heriberto Masters was seen today for annual exam.    Diagnoses and all orders for this visit:    Encounter for routine adult health examination without abnormal findings  - Anticipatory Guidance  Injury Prevention  Lap-shoulder belts, Smoke detectors, Carbon monoxide detectors, Safe storage and handling of firearms; removal if appropriate and  Occupational risk counseling  Substance Abuse  1. Tobacco cessation or never starting to include pharmacotherapy, social support for cessation, and skills training/problem solving. Avoid alcohol/drug use while driving, swimming, boating, using firearms, etc.   Sexual Behavior  1. STD prevention; abstinence; avoid high-risk behavior; condoms/female barrier with spermicide,  Contraception   Diet and Exercise   Limit fat and cholesterol; maintain caloric balance; emphasized grains, fruits and vegetables. Adequate calcium and vitamin D intake (females); add foods rich in calcium; supplement as needed. Regular physical activity at least 150 minutes per week to maintain activity   Protection from UV Light  Abuse and Violence: violence prevention at home, school and in social situations  Dental Health: Regular visits to dental health provider      Assessment/Plan:  Heriberto Masters was seen today for annual exam.    Diagnoses and all orders for this visit:    Encounter for screening for COVID-19  -     COVID-19; Future    Allergic rhinitis, unspecified seasonality, unspecified trigger  -     cetirizine (ZYRTEC ALLERGY) 10 MG tablet; Take 1 tablet by mouth daily    HTN, goal below 140/90  -     irbesartan (AVAPRO) 150 MG tablet; Take 1 tablet by mouth daily  -     triamterene-hydroCHLOROthiazide (MAXZIDE) 75-50 MG per tablet; TAKE 1/2 TABLET BY MOUTH EVERY DAY  -     atenolol (TENORMIN) 100 MG tablet;  Take 1 tablet by mouth daily    Chronic GERD  -     omeprazole (PRILOSEC) 20 MG delayed release capsule; Take 1 capsule by mouth daily For heartburn      Taz Davenport MD

## 2021-12-02 NOTE — PROGRESS NOTES
Patient ___ reached   __X___not reached-preop instructions left on voice uver___[phone]__________      DATE___12/7/21_____ TIME_1000_______ARRIVAL_0830  FEC________      Nothing to eat or drink after midnight the night before,except for what the prep instructions call for. If you do not have the instructions or do not understand them please contact your doctors office. Follow any instructions your doctors office has given you including what medications to take the AM of your procedure and which ones to hold. You may use your inhalers. If you take a long acting insulin the juloi cesar prior please cut the dose in half and take no diabetic medications that AM.Follow specific doctors office instructions regarding blood thinners and if they want you to hold and for how long. If you are on a blood thinner and have no instructions please contact the office and ask. Dress comfortably,bring your insurance card,picture ID,and a complete list of medications, including supplements. You must have a responsible adult to stay with you during the procedure,drive you home and stay with you. [facility] phone number [phone] for any questions. OTHER INTRUCTIONS(if applicable)_________________________________________________________          COVID TESTING          __X_ Covid test to be done 3-5 days prior to scheduled surgery -patient aware they are REQUIRED to bring a copy of the negative result DOS-if they receive a positive result to notify their surgeon. If known- indicate where patient is getting covid test done________________________________________________________________________    ___ Rapid - DOS    ___ Other _____________________________________      [employer]ine [employer] POLICY(subject to change)    There is a one visitor policy at [facility] for all surgeries and endoscopies. Whether the visitor can stay or will be asked to wait in the car will depend on the current policy and if social distancing can be maintained. The policy is subject to change at any time. Please make sure the visitor has a cell phone that is on,charged and able to accept calls, as this may be the way that the staff communicates with them. Pain management is NO VISITOR policyThe patients ride is expected to remain in the car with a cell phone for communication. If the ride is leaving the hospital grounds please make sure they are back in time for pickup. Have the patient inform the staff on arrival what their rides plans are while the patient is in the facility. At the MAIN there is one visitor allowed. Please note that the visitor policy is subject to change.

## 2021-12-07 ENCOUNTER — ANESTHESIA EVENT (OUTPATIENT)
Dept: ENDOSCOPY | Age: 51
End: 2021-12-07
Payer: COMMERCIAL

## 2021-12-07 ENCOUNTER — ANESTHESIA (OUTPATIENT)
Dept: ENDOSCOPY | Age: 51
End: 2021-12-07
Payer: COMMERCIAL

## 2021-12-07 ENCOUNTER — HOSPITAL ENCOUNTER (OUTPATIENT)
Age: 51
Setting detail: OUTPATIENT SURGERY
Discharge: HOME | End: 2021-12-07
Attending: INTERNAL MEDICINE | Admitting: INTERNAL MEDICINE
Payer: COMMERCIAL

## 2021-12-07 VITALS
HEART RATE: 58 BPM | RESPIRATION RATE: 16 BRPM | WEIGHT: 240 LBS | OXYGEN SATURATION: 100 % | BODY MASS INDEX: 39.99 KG/M2 | HEIGHT: 65 IN | DIASTOLIC BLOOD PRESSURE: 86 MMHG | TEMPERATURE: 97.5 F | SYSTOLIC BLOOD PRESSURE: 102 MMHG

## 2021-12-07 VITALS
OXYGEN SATURATION: 100 % | SYSTOLIC BLOOD PRESSURE: 112 MMHG | DIASTOLIC BLOOD PRESSURE: 75 MMHG | RESPIRATION RATE: 13 BRPM

## 2021-12-07 PROCEDURE — 2709999900 HC NON-CHARGEABLE SUPPLY: Performed by: INTERNAL MEDICINE

## 2021-12-07 PROCEDURE — 3700000000 HC ANESTHESIA ATTENDED CARE: Performed by: INTERNAL MEDICINE

## 2021-12-07 PROCEDURE — 7100000010 HC PHASE II RECOVERY - FIRST 15 MIN: Performed by: INTERNAL MEDICINE

## 2021-12-07 PROCEDURE — 2580000003 HC RX 258: Performed by: ANESTHESIOLOGY

## 2021-12-07 PROCEDURE — 3700000001 HC ADD 15 MINUTES (ANESTHESIA): Performed by: INTERNAL MEDICINE

## 2021-12-07 PROCEDURE — 7100000011 HC PHASE II RECOVERY - ADDTL 15 MIN: Performed by: INTERNAL MEDICINE

## 2021-12-07 PROCEDURE — 6360000002 HC RX W HCPCS: Performed by: NURSE ANESTHETIST, CERTIFIED REGISTERED

## 2021-12-07 PROCEDURE — 3609027000 HC COLONOSCOPY: Performed by: INTERNAL MEDICINE

## 2021-12-07 PROCEDURE — 2500000003 HC RX 250 WO HCPCS: Performed by: NURSE ANESTHETIST, CERTIFIED REGISTERED

## 2021-12-07 RX ORDER — LIDOCAINE HYDROCHLORIDE 20 MG/ML
INJECTION, SOLUTION INFILTRATION; PERINEURAL PRN
Status: DISCONTINUED | OUTPATIENT
Start: 2021-12-07 | End: 2021-12-07 | Stop reason: SDUPTHER

## 2021-12-07 RX ORDER — M-VIT,TX,IRON,MINS/CALC/FOLIC 27MG-0.4MG
1 TABLET ORAL DAILY
COMMUNITY

## 2021-12-07 RX ORDER — SODIUM CHLORIDE 9 MG/ML
INJECTION, SOLUTION INTRAVENOUS CONTINUOUS
Status: DISCONTINUED | OUTPATIENT
Start: 2021-12-07 | End: 2021-12-07 | Stop reason: HOSPADM

## 2021-12-07 RX ORDER — MULTIVIT WITH MINERALS/LUTEIN
1000 TABLET ORAL DAILY
COMMUNITY

## 2021-12-07 RX ORDER — ACETAMINOPHEN 160 MG
TABLET,DISINTEGRATING ORAL DAILY
COMMUNITY

## 2021-12-07 RX ORDER — GLUCOSAMINE SULFATE 500 MG
CAPSULE ORAL DAILY
COMMUNITY

## 2021-12-07 RX ORDER — PROPOFOL 10 MG/ML
INJECTION, EMULSION INTRAVENOUS PRN
Status: DISCONTINUED | OUTPATIENT
Start: 2021-12-07 | End: 2021-12-07 | Stop reason: SDUPTHER

## 2021-12-07 RX ADMIN — LIDOCAINE HYDROCHLORIDE 100 MG: 20 INJECTION, SOLUTION INFILTRATION; PERINEURAL at 10:25

## 2021-12-07 RX ADMIN — PROPOFOL 30 MG: 10 INJECTION, EMULSION INTRAVENOUS at 10:37

## 2021-12-07 RX ADMIN — PROPOFOL 50 MG: 10 INJECTION, EMULSION INTRAVENOUS at 10:41

## 2021-12-07 RX ADMIN — SODIUM CHLORIDE: 9 INJECTION, SOLUTION INTRAVENOUS at 08:45

## 2021-12-07 RX ADMIN — PROPOFOL 100 MG: 10 INJECTION, EMULSION INTRAVENOUS at 10:25

## 2021-12-07 RX ADMIN — PHENYLEPHRINE HYDROCHLORIDE 100 MCG: 10 INJECTION INTRAVENOUS at 10:39

## 2021-12-07 RX ADMIN — PROPOFOL 20 MG: 10 INJECTION, EMULSION INTRAVENOUS at 10:29

## 2021-12-07 RX ADMIN — PROPOFOL 40 MG: 10 INJECTION, EMULSION INTRAVENOUS at 10:44

## 2021-12-07 ASSESSMENT — PAIN - FUNCTIONAL ASSESSMENT: PAIN_FUNCTIONAL_ASSESSMENT: 0-10

## 2021-12-07 NOTE — H&P
Gastroenterology Note                 Pre-operative History and Physical    Patient: José Miguel Orantse  : 1970  CSN:     History Obtained From:   Patient or guardian. HISTORY OF PRESENT ILLNESS:    The patient is a 49 y.o. female here for Endoscopy. Past Medical History:    Past Medical History:   Diagnosis Date    Heart murmur     Hypertension     Morbidly obese (HCC)     Nephrolithiasis     Postmenopausal     LMP at age 44     Past Surgical History:    History reviewed. No pertinent surgical history. Medications Prior to Admission:   No current facility-administered medications on file prior to encounter. Current Outpatient Medications on File Prior to Encounter   Medication Sig Dispense Refill    Multiple Vitamins-Minerals (THERAPEUTIC MULTIVITAMIN-MINERALS) tablet Take 1 tablet by mouth daily      Ascorbic Acid (VITAMIN C) 1000 MG tablet Take 1,000 mg by mouth daily      Glucosamine 500 MG CAPS Take by mouth daily      Cholecalciferol (VITAMIN D3) 50 MCG (2000 UT) CAPS Take by mouth daily      aspirin 81 MG tablet Take 81 mg by mouth daily      Cholecalciferol (VITAMIN D3) 5000 units TABS Take by mouth          Allergies:  Patient has no known allergies.       Social History:   Social History     Tobacco Use    Smoking status: Never Smoker    Smokeless tobacco: Never Used   Substance Use Topics    Alcohol use: No     Family History:   Family History   Problem Relation Age of Onset    High Blood Pressure Mother     High Cholesterol Mother     Cancer Mother         uterine    High Blood Pressure Father     High Cholesterol Father     Other Maternal Grandfather         kidney failure    Hypertension Sister        PHYSICAL EXAM:      BP (!) 141/80   Pulse 72   Temp 97.7 °F (36.5 °C) (Temporal)   Resp 16   Ht 5' 5\" (1.651 m)   Wt 240 lb (108.9 kg)   SpO2 100%   BMI 39.94 kg/m²  I        Heart:  RRR, normal s1s2    Lungs:  CTA and normal effort    Abdomen: Soft, nt nd. ASSESSMENT AND PLAN:    1. Patient is a 49 y.o. female here for endoscopy with MAC sedation. 2.  Procedure options, risks and benefits reviewed with patient and/or guardian. They express understanding.

## 2021-12-07 NOTE — ANESTHESIA PRE PROCEDURE
Department of Anesthesiology  Preprocedure Note       Name:  Nate Alva   Age:  49 y.o.  :  1970                                          MRN:  [id number]         Date:  2021      Surgeon: Chrissy Parson):  Jess Kimball MD    Procedure: Procedure(s):  COLONOSCOPY DIAGNOSTIC    Medications prior to admission:   Prior to Admission medications    Medication Sig Start Date End Date Taking?  Authorizing Provider   Multiple Vitamins-Minerals (THERAPEUTIC MULTIVITAMIN-MINERALS) tablet Take 1 tablet by mouth daily   Yes Historical Provider, MD   cetirizine (ZYRTEC ALLERGY) 10 MG tablet Take 1 tablet by mouth daily 21  Yes Fernando Chirinos MD   irbesartan (AVAPRO) 150 MG tablet Take 1 tablet by mouth daily 21  Yes Fernando Chirinos MD   omeprazole (PRILOSEC) 20 MG delayed release capsule Take 1 capsule by mouth daily For heartburn 21  Yes Fernando Chirinos MD   triamterene-hydroCHLOROthiazide (MAXZIDE) 75-50 MG per tablet TAKE 1/2 TABLET BY MOUTH EVERY DAY 21  Yes Fernando Chirinos MD   atenolol (TENORMIN) 100 MG tablet Take 1 tablet by mouth daily 21  Yes Fernando Chirinos MD   Ascorbic Acid (VITAMIN C) 1000 MG tablet Take 1,000 mg by mouth daily    Historical Provider, MD   Glucosamine 500 MG CAPS Take by mouth daily    Historical Provider, MD   Cholecalciferol (VITAMIN D3) 50 MCG (2000 UT) CAPS Take by mouth daily    Historical Provider, MD   aspirin 81 MG tablet Take 81 mg by mouth daily    Historical Provider, MD   Cholecalciferol (VITAMIN D3) 5000 units TABS Take by mouth    Historical Provider, MD       Current medications:    Current Facility-Administered Medications   Medication Dose Route Frequency Provider Last Rate Last Admin    0.9 % sodium chloride infusion   IntraVENous Continuous Carlos Ocampo MD           Allergies:  No Known Allergies    Problem List:    Patient Active Problem List   Diagnosis Code    Hyperlipidemia LDL goal <130 E78.5    Essential hypertension I10       Past Medical History:        Diagnosis Date    Heart murmur     Hypertension     Morbidly obese (HCC)     Nephrolithiasis     Postmenopausal     LMP at age 44       Past Surgical History:  History reviewed. No pertinent surgical history. Social History:    Social History     Tobacco Use    Smoking status: Never Smoker    Smokeless tobacco: Never Used   Substance Use Topics    Alcohol use: No                                Counseling given: Not Answered      Vital Signs (Current):   Vitals:    12/07/21 0834   BP: (!) 141/80   Pulse: 72   Resp: 16   Temp: 97.7 °F (36.5 °C)   TempSrc: Temporal   SpO2: 100%   Weight: 240 lb (108.9 kg)   Height: 5' 5\" (1.651 m)                                              BP Readings from Last 3 Encounters:   12/07/21 (!) 141/80   12/01/21 110/80   09/01/21 126/80       NPO Status: Time of last liquid consumption: 0645                        Time of last solid consumption: 2359                        Date of last liquid consumption: 12/07/21 (sip of water with meds)                        Date of last solid food consumption: 12/05/21    BMI:   Wt Readings from Last 3 Encounters:   12/07/21 240 lb (108.9 kg)   12/01/21 240 lb 6.4 oz (109 kg)   09/01/21 236 lb (107 kg)     Body mass index is 39.94 kg/m².     CBC:   Lab Results   Component Value Date    WBC 9.8 02/03/2016    RBC 4.34 02/03/2016    HGB 12.1 02/03/2016    HCT 37.3 02/03/2016    MCV 85.9 02/03/2016    RDW 12.9 02/03/2016     02/03/2016       CMP:   Lab Results   Component Value Date     11/23/2021    K 4.1 11/23/2021     11/23/2021    CO2 22 11/23/2021    BUN 11 11/23/2021    CREATININE 0.7 11/23/2021    GFRAA >60 11/23/2021    GFRAA >60 05/08/2013    AGRATIO 1.1 11/23/2021    LABGLOM >60 11/23/2021    GLUCOSE 91 11/23/2021    PROT 7.9 11/23/2021    CALCIUM 10.6 11/23/2021    BILITOT <0.2 11/23/2021    ALKPHOS 90 11/23/2021    AST 21 11/23/2021    ALT 14 11/23/2021       POC Tests: No results for input(s): POCGLU, POCNA, POCK, POCCL, POCBUN, POCHEMO, POCHCT in the last 72 hours. Coags: No results found for: PROTIME, INR, APTT    HCG (If Applicable):   Lab Results   Component Value Date    PREGTESTUR Negative 02/03/2016        ABGs: No results found for: PHART, PO2ART, DJB7WXB, EKW9EBB, BEART, N8LQOXPL     Type & Screen (If Applicable):  No results found for: LABABO, LABRH    Drug/Infectious Status (If Applicable):  No results found for: HIV, HEPCAB    COVID-19 Screening (If Applicable): No results found for: COVID19        Anesthesia Evaluation  Patient summary reviewed and Nursing notes reviewed no history of anesthetic complications:   Airway: Mallampati: II  TM distance: >3 FB   Neck ROM: full  Mouth opening: > = 3 FB Dental: normal exam         Pulmonary:       (-) COPD, asthma and wheezes                           Cardiovascular:  Exercise tolerance: good (>4 METS),   (+) hypertension:,     (-) CABG/stent, dysrhythmias and  angina      Rhythm: regular  Rate: normal                    Neuro/Psych:      (-) seizures, TIA and CVA           GI/Hepatic/Renal:   (+) GERD (occ gerd sx. denies any today):,           Endo/Other:        (-) blood dyscrasia               Abdominal:             Vascular: negative vascular ROS. Other Findings:             Anesthesia Plan      MAC     ASA 2       Induction: intravenous. Anesthetic plan and risks discussed with patient. Plan discussed with CRNA.                   María Cook MD   12/7/2021

## 2021-12-07 NOTE — PROGRESS NOTES
Dr. Hillary Giordano spoke with patient at bedside about procedure. Discharged to home with mom. Has instructions, purse, jacket and all belongings.

## 2021-12-07 NOTE — PROGRESS NOTES
Received from Endo Procedure Room to Phase 2 Recovery. Drowsy, responds easily to verbal and tactile stimulation. Respirations easy on room air. VSS. Abdomen soft.

## 2021-12-07 NOTE — PROGRESS NOTES
Discharge instructions reviewed with patient and mother, signed and copy given. All questions answered and patient and/or responsible adult verbalizes understanding.

## 2021-12-07 NOTE — ANESTHESIA POSTPROCEDURE EVALUATION
Department of Anesthesiology  Postprocedure Note    Patient: Livia Baker  MRN: [id number]  YOB: 1970  Date of evaluation: 12/7/2021  Time:  10:56 AM     Procedure Summary     Date: 12/07/21 Room / Location: [address] [place] [address]    Anesthesia Start: 1022 Anesthesia Stop: 1050    Procedure: COLONOSCOPY DIAGNOSTIC (N/A ) Diagnosis: (SCREENING FOR COLON CANCER Z12.11)    Surgeons: Deep Zambrano MD Responsible Provider: Valerio Melgoza MD    Anesthesia Type: MAC ASA Status: 2          Anesthesia Type: MAC    Sonia Phase I: Sonia Score: 10    Sonia Phase II: Sonia Score: 9    Last vitals: Reviewed and per EMR flowsheets. Anesthesia Post Evaluation    Patient location during evaluation: PACU  Patient participation: complete - patient participated  Level of consciousness: awake  Airway patency: patent  Nausea & Vomiting: no vomiting  Complications: no  Cardiovascular status: hemodynamically stable  Respiratory status: acceptable  Hydration status: euvolemic  There was medical reason for not using a multimodal analgesia pain management approach.

## 2022-01-21 DIAGNOSIS — I10 HTN, GOAL BELOW 140/90: ICD-10-CM

## 2022-01-23 RX ORDER — TRIAMTERENE AND HYDROCHLOROTHIAZIDE 75; 50 MG/1; MG/1
TABLET ORAL
Qty: 45 TABLET | Refills: 0 | Status: SHIPPED | OUTPATIENT
Start: 2022-01-23 | End: 2022-04-23 | Stop reason: SDUPTHER

## 2022-03-05 DIAGNOSIS — J30.9 ALLERGIC RHINITIS, UNSPECIFIED SEASONALITY, UNSPECIFIED TRIGGER: ICD-10-CM

## 2022-03-07 RX ORDER — CETIRIZINE HYDROCHLORIDE 10 MG/1
TABLET ORAL
Qty: 90 TABLET | Refills: 0 | Status: SHIPPED | OUTPATIENT
Start: 2022-03-07 | End: 2022-05-04 | Stop reason: SDUPTHER

## 2022-04-05 DIAGNOSIS — I10 HTN, GOAL BELOW 140/90: ICD-10-CM

## 2022-04-06 DIAGNOSIS — I10 HTN, GOAL BELOW 140/90: ICD-10-CM

## 2022-04-06 RX ORDER — IRBESARTAN 150 MG/1
TABLET ORAL
Qty: 90 TABLET | Refills: 0 | Status: CANCELLED | OUTPATIENT
Start: 2022-04-06

## 2022-04-06 RX ORDER — IRBESARTAN 150 MG/1
TABLET ORAL
Qty: 90 TABLET | Refills: 0 | Status: SHIPPED | OUTPATIENT
Start: 2022-04-06 | End: 2022-05-04 | Stop reason: SDUPTHER

## 2022-04-06 NOTE — TELEPHONE ENCOUNTER
Requested Prescriptions     Pending Prescriptions Disp Refills    irbesartan (AVAPRO) 150 MG tablet 90 tablet 0     Please review the pending medication refill.     Patient was last seen 12/01/2021    Next office visit is not scheduled    irbesartan (AVAPRO) 150 MG tablet [[id number]]     Order Details  Dose, Route, Frequency: As Directed   Dispense Quantity: 90 tablet Refills: 0          Sig: Take 1 tablet by mouth once daily         Start Date: 04/06/22       MEDICATION REFILLED YESTERDAY

## 2022-04-23 DIAGNOSIS — I10 HTN, GOAL BELOW 140/90: ICD-10-CM

## 2022-04-25 NOTE — TELEPHONE ENCOUNTER
Recent Visits  Date Type Provider Dept   12/01/21 Office Visit Solis Wilks MD [facility]   09/01/21 Office Visit Solis Wilks MD [facility]   11/04/20 Office Visit Solis Wilks MD [facility]   Showing recent visits within past 540 days with a meds authorizing provider and meeting all other requirements  Future Appointments  No visits were found meeting these conditions.   Showing future appointments within next 150 days with a meds authorizing provider and meeting all other requirements     12/1/2021

## 2022-04-26 RX ORDER — TRIAMTERENE AND HYDROCHLOROTHIAZIDE 75; 50 MG/1; MG/1
TABLET ORAL
Qty: 45 TABLET | Refills: 0 | Status: SHIPPED | OUTPATIENT
Start: 2022-04-26 | End: 2022-05-04 | Stop reason: SDUPTHER

## 2022-05-04 ENCOUNTER — OFFICE VISIT (OUTPATIENT)
Dept: INTERNAL MEDICINE CLINIC | Age: 52
End: 2022-05-04
Payer: MEDICAID

## 2022-05-04 VITALS
HEART RATE: 61 BPM | WEIGHT: 244.2 LBS | OXYGEN SATURATION: 99 % | SYSTOLIC BLOOD PRESSURE: 130 MMHG | BODY MASS INDEX: 40.69 KG/M2 | HEIGHT: 65 IN | DIASTOLIC BLOOD PRESSURE: 70 MMHG | TEMPERATURE: 98 F

## 2022-05-04 DIAGNOSIS — J30.9 ALLERGIC RHINITIS, UNSPECIFIED SEASONALITY, UNSPECIFIED TRIGGER: ICD-10-CM

## 2022-05-04 DIAGNOSIS — M25.512 LEFT SHOULDER PAIN, UNSPECIFIED CHRONICITY: Primary | ICD-10-CM

## 2022-05-04 DIAGNOSIS — K21.9 CHRONIC GERD: ICD-10-CM

## 2022-05-04 DIAGNOSIS — I10 HTN, GOAL BELOW 140/90: ICD-10-CM

## 2022-05-04 PROCEDURE — 3017F COLORECTAL CA SCREEN DOC REV: CPT | Performed by: INTERNAL MEDICINE

## 2022-05-04 PROCEDURE — 99214 OFFICE O/P EST MOD 30 MIN: CPT | Performed by: INTERNAL MEDICINE

## 2022-05-04 PROCEDURE — 1036F TOBACCO NON-USER: CPT | Performed by: INTERNAL MEDICINE

## 2022-05-04 PROCEDURE — G8427 DOCREV CUR MEDS BY ELIG CLIN: HCPCS | Performed by: INTERNAL MEDICINE

## 2022-05-04 PROCEDURE — G8417 CALC BMI ABV UP PARAM F/U: HCPCS | Performed by: INTERNAL MEDICINE

## 2022-05-04 RX ORDER — ATENOLOL 100 MG/1
100 TABLET ORAL DAILY
Qty: 90 TABLET | Refills: 1 | Status: SHIPPED | OUTPATIENT
Start: 2022-05-04 | End: 2022-09-15 | Stop reason: SDUPTHER

## 2022-05-04 RX ORDER — IRBESARTAN 150 MG/1
TABLET ORAL
Qty: 90 TABLET | Refills: 0 | Status: SHIPPED | OUTPATIENT
Start: 2022-05-04

## 2022-05-04 RX ORDER — CETIRIZINE HYDROCHLORIDE 10 MG/1
TABLET ORAL
Qty: 90 TABLET | Refills: 1 | Status: SHIPPED | OUTPATIENT
Start: 2022-05-04

## 2022-05-04 RX ORDER — TRIAMTERENE AND HYDROCHLOROTHIAZIDE 75; 50 MG/1; MG/1
TABLET ORAL
Qty: 45 TABLET | Refills: 0 | Status: SHIPPED | OUTPATIENT
Start: 2022-05-04

## 2022-05-04 RX ORDER — OMEPRAZOLE 20 MG/1
20 CAPSULE, DELAYED RELEASE ORAL DAILY
Qty: 90 CAPSULE | Refills: 1 | Status: SHIPPED | OUTPATIENT
Start: 2022-05-04

## 2022-05-04 ASSESSMENT — ANXIETY QUESTIONNAIRES
6. BECOMING EASILY ANNOYED OR IRRITABLE: 0
2. NOT BEING ABLE TO STOP OR CONTROL WORRYING: 0
IF YOU CHECKED OFF ANY PROBLEMS ON THIS QUESTIONNAIRE, HOW DIFFICULT HAVE THESE PROBLEMS MADE IT FOR YOU TO DO YOUR WORK, TAKE CARE OF THINGS AT HOME, OR GET ALONG WITH OTHER PEOPLE: NOT DIFFICULT AT ALL
4. TROUBLE RELAXING: 0
5. BEING SO RESTLESS THAT IT IS HARD TO SIT STILL: 0
1. FEELING NERVOUS, ANXIOUS, OR ON EDGE: 0
3. WORRYING TOO MUCH ABOUT DIFFERENT THINGS: 1
GAD7 TOTAL SCORE: 1
7. FEELING AFRAID AS IF SOMETHING AWFUL MIGHT HAPPEN: 0

## 2022-05-04 ASSESSMENT — PATIENT HEALTH QUESTIONNAIRE - PHQ9
2. FEELING DOWN, DEPRESSED OR HOPELESS: 0
SUM OF ALL RESPONSES TO PHQ9 QUESTIONS 1 & 2: 0
SUM OF ALL RESPONSES TO PHQ QUESTIONS 1-9: 0
SUM OF ALL RESPONSES TO PHQ QUESTIONS 1-9: 0
1. LITTLE INTEREST OR PLEASURE IN DOING THINGS: 0
SUM OF ALL RESPONSES TO PHQ QUESTIONS 1-9: 0
SUM OF ALL RESPONSES TO PHQ QUESTIONS 1-9: 0

## 2022-05-04 NOTE — PROGRESS NOTES
Subjective:      Patient ID: Leonie Bruner is a 49 y.o. female. Shoulder Pain   The pain is present in the left shoulder. This is a new problem. The current episode started more than 1 month ago. The problem occurs daily. The problem has been gradually worsening. The quality of the pain is described as aching and sharp. The pain is at a severity of 8/10. The pain is moderate. Associated symptoms include an inability to bear weight. Pertinent negatives include no fever, itching, joint locking, joint swelling, limited range of motion, numbness, stiffness or tingling. The symptoms are aggravated by cold and lying down (laying on arm). She has tried NSAIDS for the symptoms. Review of Systems   Constitutional: Negative for fever. Musculoskeletal: Negative for stiffness. Skin: Negative for itching. Neurological: Negative for tingling and numbness. No Known Allergies    Current Outpatient Medications   Medication Sig Dispense Refill    triamterene-hydroCHLOROthiazide (MAXZIDE) 75-50 MG per tablet Take 1/2 (one-half) tablet by mouth once daily 45 tablet 0    irbesartan (AVAPRO) 150 MG tablet Take 1 tablet by mouth once daily 90 tablet 0    cetirizine (ZYRTEC) 10 MG tablet Take 1 tablet by mouth once daily 90 tablet 0    Ascorbic Acid (VITAMIN C) 1000 MG tablet Take 1,000 mg by mouth daily      Multiple Vitamins-Minerals (THERAPEUTIC MULTIVITAMIN-MINERALS) tablet Take 1 tablet by mouth daily      Glucosamine 500 MG CAPS Take by mouth daily      Cholecalciferol (VITAMIN D3) 50 MCG (2000 UT) CAPS Take by mouth daily      omeprazole (PRILOSEC) 20 MG delayed release capsule Take 1 capsule by mouth daily For heartburn 90 capsule 1    atenolol (TENORMIN) 100 MG tablet Take 1 tablet by mouth daily 90 tablet 1    aspirin 81 MG tablet Take 81 mg by mouth daily      Cholecalciferol (VITAMIN D3) 5000 units TABS Take by mouth       No current facility-administered medications for this visit. Vitals:    05/04/22 1605   BP: 130/70   Site: Right Upper Arm   Position: Sitting   Pulse: 61   Temp: 98 °F (36.7 °C)   TempSrc: Temporal   SpO2: 99%   Weight: 244 lb 3.2 oz (110.8 kg)   Height: 5' 5\" (1.651 m)     Body mass index is 40.64 kg/m². Wt Readings from Last 3 Encounters:   05/04/22 244 lb 3.2 oz (110.8 kg)   12/07/21 240 lb (108.9 kg)   12/01/21 240 lb 6.4 oz (109 kg)     BP Readings from Last 3 Encounters:   05/04/22 130/70   12/07/21 102/86   12/07/21 112/75       Objective:   Physical Exam  Vitals and nursing note reviewed. Constitutional:       General: She is not in acute distress. Appearance: She is well-developed. Pulmonary:      Effort: Pulmonary effort is normal.   Neurological:      Mental Status: She is alert and oriented to person, place, and time. Psychiatric:         Behavior: Behavior normal.         Thought Content: Thought content normal.         Judgment: Judgment normal.         Assessment/Plan:  Sheng Guzman was seen today for arm problem. Diagnoses and all orders for this visit:    Left shoulder pain, unspecified chronicity  -     XR SHOULDER LEFT (MIN 2 VIEWS); Future  -     diclofenac (VOLTAREN) 50 MG EC tablet; Take 1 tablet by mouth 3 times daily as needed for Pain      Return if symptoms worsen or fail to improve.             Willow Montgomery MD

## 2022-05-04 NOTE — PATIENT INSTRUCTIONS
Patient Education        Shoulder Arthritis: Exercises  Introduction  Here are some examples of exercises for you to try. The exercises may be suggested for a condition or for rehabilitation. Start each exercise slowly. Ease off the exercises if you start to have pain. You will be told when to start these exercises and which ones will work bestfor you. How to do the exercises  Shoulder flexion (lying down)    To make a wand for this exercise, use a piece of PVC pipe or a broom handlewith the broom removed. Make the wand about a foot wider than your shoulders. 1. Lie on your back, holding a wand with both hands. Your palms should face down as you hold the wand. 2. Keeping your elbows straight, slowly raise your arms over your head. Raise them until you feel a stretch in your shoulders, upper back, and chest.  3. Hold for 15 to 30 seconds. 4. Repeat 2 to 4 times. Shoulder rotation (lying down)    To make a wand for this exercise, use a piece of PVC pipe or a broom handlewith the broom removed. Make the wand about a foot wider than your shoulders. 1. Lie on your back. Hold a wand with both hands with your elbows bent and palms up. 2. Keep your elbows close to your body, and move the wand across your body toward the sore arm. 3. Hold for 8 to 12 seconds. 4. Repeat 2 to 4 times. Shoulder internal rotation with towel    1. Hold a towel above and behind your head with the arm that is not sore. 2. With your sore arm, reach behind your back and grasp the towel. 3. With the arm above your head, pull the towel upward. Do this until you feel a stretch on the front and outside of your sore shoulder. 4. Hold 15 to 30 seconds. 5. Repeat 2 to 4 times. Shoulder blade squeeze    1. Stand with your arms at your sides, and squeeze your shoulder blades together. Do not raise your shoulders up as you squeeze. 2. Hold 6 seconds. 3. Repeat 8 to 12 times.   Resisted rows    For this exercise, you will need elastic exercise material, such as surgicaltubing or Thera-Band. 1. Put the band around a solid object at about waist level. (A bedpost will work well.) Each hand should hold an end of the band. 2. With your elbows at your sides and bent to 90 degrees, pull the band back. Your shoulder blades should move toward each other. Return to the starting position. 3. Repeat 8 to 12 times. External rotator strengthening exercise    1. Start by tying a piece of elastic exercise material to a doorknob. You can use surgical tubing or Thera-Band. (You may also hold one end of the band in each hand.)  2. Stand or sit with your shoulder relaxed and your elbow bent 90 degrees. Your upper arm should rest comfortably against your side. Squeeze a rolled towel between your elbow and your body for comfort. This will help keep your arm at your side. 3. Hold one end of the elastic band with the hand of the painful arm. 4. Start with your forearm across your belly. Slowly rotate the forearm out away from your body. Keep your elbow and upper arm tucked against the towel roll or the side of your body until you begin to feel tightness in your shoulder. Slowly move your arm back to where you started. 5. Repeat 8 to 12 times. Internal rotator strengthening exercise    1. Start by tying a piece of elastic exercise material to a doorknob. You can use surgical tubing or Thera-Band. 2. Stand or sit with your shoulder relaxed and your elbow bent 90 degrees. Your upper arm should rest comfortably against your side. Squeeze a rolled towel between your elbow and your body for comfort. This will help keep your arm at your side. 3. Hold one end of the elastic band in the hand of the painful arm. 4. Slowly rotate your forearm toward your body until it touches your belly. Slowly move it back to where you started. 5. Keep your elbow and upper arm firmly tucked against the towel roll or at your side. 6. Repeat 8 to 12 times.   Pendulum swing    If you have pain in your back, do not do this exercise. 1. Hold on to a table or the back of a chair with your good arm. Then bend forward a little and let your sore arm hang straight down. This exercise does not use the arm muscles. Rather, use your legs and your hips to create movement that makes your arm swing freely. 2. Use the movement from your hips and legs to guide the slightly swinging arm back and forth like a pendulum (or elephant trunk). Then guide it in circles that start small (about the size of a dinner plate). Make the circles a bit larger each day, as your pain allows. 3. Do this exercise for 5 minutes, 5 to 7 times each day. 4. As you have less pain, try bending over a little farther to do this exercise. This will increase the amount of movement at your shoulder. Follow-up care is a key part of your treatment and safety. Be sure to make and go to all appointments, and call your doctor if you are having problems. It's also a good idea to know your test results and keep alist of the medicines you take. Where can you learn more? Go to https://[employer]. org and sign in to your [employer] account. Enter H562 in the "[employer] box to learn more about \"Shoulder Arthritis: Exercises. \"     If you do not have an account, please click on the \"Sign Up Now\" link. Current as of: July 1, 2021               Content Version: 13.2  © 7539-0232 [employer]. Care instructions adapted under license by [facility] ([facility]). If you have questions about a medical condition or this instruction, always ask your healthcare professional. [place] [address] any warranty or liability for your use of this information.

## 2022-05-10 ENCOUNTER — HOSPITAL ENCOUNTER (OUTPATIENT)
Age: 52
Discharge: HOME | End: 2022-05-10
Payer: MEDICAID

## 2022-05-10 ENCOUNTER — HOSPITAL ENCOUNTER (OUTPATIENT)
Dept: GENERAL RADIOLOGY | Age: 52
Discharge: HOME | End: 2022-05-10
Payer: MEDICAID

## 2022-05-10 DIAGNOSIS — M25.512 LEFT SHOULDER PAIN, UNSPECIFIED CHRONICITY: ICD-10-CM

## 2022-05-10 PROCEDURE — 73030 X-RAY EXAM OF SHOULDER: CPT

## 2022-05-16 ENCOUNTER — TELEPHONE (OUTPATIENT)
Dept: INTERNAL MEDICINE CLINIC | Age: 52
End: 2022-05-16

## 2022-05-16 DIAGNOSIS — M14.819: Primary | ICD-10-CM

## 2022-06-09 ENCOUNTER — OFFICE VISIT (OUTPATIENT)
Dept: ORTHOPEDIC SURGERY | Age: 52
End: 2022-06-09
Payer: MEDICAID

## 2022-06-09 VITALS — BODY MASS INDEX: 40.65 KG/M2 | WEIGHT: 244 LBS | HEIGHT: 65 IN

## 2022-06-09 DIAGNOSIS — M25.512 LEFT SHOULDER PAIN, UNSPECIFIED CHRONICITY: Primary | ICD-10-CM

## 2022-06-09 PROCEDURE — 99243 OFF/OP CNSLTJ NEW/EST LOW 30: CPT | Performed by: ORTHOPAEDIC SURGERY

## 2022-06-09 PROCEDURE — G8427 DOCREV CUR MEDS BY ELIG CLIN: HCPCS | Performed by: ORTHOPAEDIC SURGERY

## 2022-06-09 PROCEDURE — 20610 DRAIN/INJ JOINT/BURSA W/O US: CPT | Performed by: ORTHOPAEDIC SURGERY

## 2022-06-09 PROCEDURE — G8417 CALC BMI ABV UP PARAM F/U: HCPCS | Performed by: ORTHOPAEDIC SURGERY

## 2022-06-09 RX ORDER — BUPIVACAINE HYDROCHLORIDE 5 MG/ML
4 INJECTION, SOLUTION PERINEURAL ONCE
Status: COMPLETED | OUTPATIENT
Start: 2022-06-09 | End: 2022-06-09

## 2022-06-09 RX ORDER — METHYLPREDNISOLONE ACETATE 40 MG/ML
40 INJECTION, SUSPENSION INTRA-ARTICULAR; INTRALESIONAL; INTRAMUSCULAR; SOFT TISSUE ONCE
Status: COMPLETED | OUTPATIENT
Start: 2022-06-09 | End: 2022-06-09

## 2022-06-09 RX ADMIN — METHYLPREDNISOLONE ACETATE 40 MG: 40 INJECTION, SUSPENSION INTRA-ARTICULAR; INTRALESIONAL; INTRAMUSCULAR; SOFT TISSUE at 11:16

## 2022-06-09 RX ADMIN — BUPIVACAINE HYDROCHLORIDE 20 MG: 5 INJECTION, SOLUTION PERINEURAL at 11:15

## 2022-06-09 NOTE — Clinical Note
Dear Dr. Trell Marlow,    Thank you very much for the referral and allowing me to participate in this patient's care. Please see the attached note for full details of the visit.     With kind regards,  Betina León MD

## 2022-06-13 NOTE — PROGRESS NOTES
6/9/2022     Reason for visit:  Left shoulder pain    History of Present Illness:  Patient is a 52-year-old female who presents for evaluation of her left shoulder. She presents as a referral from Dr. Nelson Garcia. She reports pain for several months. Recent worsening. The pain is diffuse around the shoulder and made worse with overactivity, reaching, and lifting. No numbness or tingling. No injury prior to onset of her symptoms.     Medical History:  Past Medical History:   Diagnosis Date    Heart murmur     Hypertension     Morbidly obese (HCC)     Nephrolithiasis     Postmenopausal     LMP at age 44      Past Surgical History:   Procedure Laterality Date    COLONOSCOPY N/A 12/7/2021    COLONOSCOPY DIAGNOSTIC performed by Francisca Gibbs MD at [address] [place] [address]      Family History   Problem Relation Age of Onset    High Blood Pressure Mother     High Cholesterol Mother     Cancer Mother         uterine    High Blood Pressure Father     High Cholesterol Father     Other Maternal Grandfather         kidney failure    Hypertension Sister       Social History     Socioeconomic History    Marital status: Single     Spouse name: Not on file    Number of children: Not on file    Years of education: Not on file    Highest education level: Not on file   Occupational History    Occupation: [employer]     Comment: [employer]   Tobacco Use    Smoking status: Never Smoker    Smokeless tobacco: Never Used   Vaping Use    Vaping Use: Never used   Substance and Sexual Activity    Alcohol use: No    Drug use: No    Sexual activity: Not Currently     Partners: Male   Other Topics Concern    Not on file   Social History Narrative    Lives home with her 3 daughters (22-24- 21)     Social Determinants of Health     Financial Resource Strain: Low Risk     Difficulty of Paying Living Expenses: Not hard at all   Food Insecurity: No Food Insecurity    Worried About [address] [employer] biometrics in the Last Year: Never tablet by mouth daily      Glucosamine 500 MG CAPS Take by mouth daily      Cholecalciferol (VITAMIN D3) 50 MCG (2000 UT) CAPS Take by mouth daily      aspirin 81 MG tablet Take 81 mg by mouth daily      Cholecalciferol (VITAMIN D3) 5000 units TABS Take by mouth       No current facility-administered medications on file prior to visit. No Known Allergies     Review of Systems:  Constitutional: Patient is adequately groomed with no evidence of malnutrition  Mental Status: The patient is oriented to time, place and person. The patient's mood and affect are appropriate. Lymphatic: The lymphatic examination bilaterally reveals all areas to be without enlargement or induration. Vascular: Examination reveals no swelling or calf tenderness. Peripheral pulses are palpable and 2+. Neurological: The patient has good coordination. There is no weakness or sensory deficit. Skin:  Head/Neck: inspection reveals no rashes, ulcerations or lesions. Trunk: inspection reveals no rashes, ulcerations or lesions. Objective:  Ht 5' 5\" (1.651 m)   Wt 244 lb (110.7 kg)   BMI 40.60 kg/m²      Physical Exam:  The patient is well-appearing and in no apparent distress  Neg Spurling's test  Examination of the left shoulder  There is no swelling, ecchymosis, or gross deformity  There is no evidence of muscle atrophy  + Perkins test, neg Neers test  neg bicipital groove tenderness, neg AC joint tenderness  Range of motion reveals 140 degrees of forward flexion, 140 degrees of abduction, 60 degrees of external rotation, internal rotation to low thoracic spine  4+/5 strength with resisted abduction, 4+/5 strength with resisted external rotation, 5/5 strength with resisted internal rotation  Intact motor and sensory function throughout the median/radial/ulnar/PIN/AIN distributions  Palpable radial pulse, brisk cap refill, 2+ symmetric reflexes    Imaging:  X-rays of the left shoulder were reviewed.   There is no fracture or dislocation. No other abnormality. Assessment:  Left shoulder pain. Suspect impingement rotator cuff tendinopathy    Plan:  I discussed with the patient the diagnosis and treatment options. We discussed operative and nonoperative management. At this point I do recommend nonoperative management. Nonoperative treatment options include activity modification, anti-inflammatory medications, physical therapy, and injections. The patient elected proceed with cortisone injection. Therefore injection was given to the left subacromial space via sterile technique. The injection consisted of 40 mg of Depo-Medrol combined with 4 mL of 0.5% Marcaine. The patient tolerated this well. We will also send the patient to physical therapy. The patient will return as needed. If she does remain symptomatic she will call us the next up would be an MRI. Greater than 45 minutes were spent with this encounter. Time spent included evaluating the patient's chart prior to arrival.  Evaluating the patient in the office including history, physical examination, imaging reviewing, and counseling on next steps. Lastly, time was spent discussing orders with my staff as well as providing documentation in the chart. Sabina Skelton MD            Orthopaedic Surgery Sports Medicine and [facility] and [facility]            Team Physician [facility] ([place]      Disclaimer: This note was dictated with voice recognition software. Though review and correction are routine, we apologize for any errors.

## 2022-06-23 ENCOUNTER — HOSPITAL ENCOUNTER (OUTPATIENT)
Dept: PHYSICAL THERAPY | Age: 52
Setting detail: THERAPIES SERIES
Discharge: HOME | End: 2022-06-23

## 2022-06-23 NOTE — FLOWSHEET NOTE
Physical Therapy  Cancellation/No-show Note  Patient Name:  Kemal Garces  :  1970   Date:  2022  Cancelled visits to date: 1  No-shows to date: 0    Patient status for today's appointment patient:  [x]  Cancelled:  Eval  []  Rescheduled appointment  []  No-show     Reason given by patient:  []  Patient ill  []  Conflicting appointment  []  No transportation    []  Conflict with work  [x]  No reason given  []  Other:     Comments:      Phone call information:   []  Phone call made today to patient at _ time at number provided:      []  Patient answered, conversation as follows:    []  Patient did not answer, message left as follows:  []  Phone call not made today  [x]  Phone call not needed - pt contacted us to cancel and provided reason for cancellation.      Electronically signed by:  Jaida Funez, PT, DPT, MS Principal Discharge DX:	General medical exam

## 2022-07-11 ENCOUNTER — HOSPITAL ENCOUNTER (OUTPATIENT)
Dept: PHYSICAL THERAPY | Age: 52
Setting detail: THERAPIES SERIES
Discharge: HOME | End: 2022-07-11
Payer: MEDICAID

## 2022-07-11 PROCEDURE — 97112 NEUROMUSCULAR REEDUCATION: CPT | Performed by: PHYSICAL THERAPIST

## 2022-07-11 PROCEDURE — 97110 THERAPEUTIC EXERCISES: CPT | Performed by: PHYSICAL THERAPIST

## 2022-07-11 PROCEDURE — 97161 PT EVAL LOW COMPLEX 20 MIN: CPT | Performed by: PHYSICAL THERAPIST

## 2022-07-11 NOTE — PLAN OF CARE
[id number] [address], [facility] [address]  Phone: [phone]   Fax: [phone]                                                     Physical Therapy Certification    Dear Hector Banuelos MD  ,    We had the pleasure of evaluating the following patient for physical therapy services at [address] [place] [address]. A summary of our findings can be found in the initial assessment below. This includes our plan of care. If you have any questions or concerns regarding these findings, please do not hesitate to contact me at the office phone number checked above. Thank you for the referral.       Physician Signature:_______________________________Date:__________________  By signing above (or electronic signature), therapists plan is approved by physician      Patient: Eduarda Daly   : 1970   MRN: [id number]  Referring Physician: Hector Banuelos MD        Evaluation Date: 2022      Medical Diagnosis Information:  Diagnosis: K16.166 (ICD-10-CM) - Left shoulder pain, unspecified chronicity   Treatment Diagnosis: M25.512 (ICD-10-CM) - Left shoulder pain, unspecified chronicity                                         Insurance information: PT Insurance Information: Meyers/30 visit/0 used/ No auth unless >30 visits    Precautions/ Contra-indications: none  Latex Allergy:  [x]NO      []YES  Preferred Language for Healthcare:   [x]English       []Other:    C-SSRS Triggered by Intake questionnaire (Past 2 wk assessment ):   [x] No, Questionnaire did not trigger screening.   [] Yes, Patient intake triggered C-SSRS Screening     [] Completed, no further action required. [] Completed, PCP notified via Epic    SUBJECTIVE: Patient stated complaint: Reports L shld pain started about several months ago. Reports insidious in onset. Main complaint is pain noted at night only if sleeping on that side. Reports pain will start within 5 min.  Pain located mid deltoid region when noted. No pain in general noted in the day except for occasional achyness. Reports injection in L shoulder helped for 3-4 weeks but did not get rid of the night pain completely. Relevant Medical History:HTN, OA B knees, Anxiety/Depression  Functional Outcome: FOTO physical FS primary measure score = 76; Risk adjusted = 53    Pain Scale: 7-8/10  Easing factors: avoiding sleeping on  L shoulder. 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 Living Status/Prior Level of Function: Prior to this injury / incident, pt was independent with ADLs and IADLs, Works out at home with chair exercises and water bottle exercises with UE exercises. Lives with parents. OBJECTIVE:  Imaging:  X-rays of the left shoulder were reviewed. There is no fracture or dislocation. No other abnormality.     Hand dominance: Right hand dominance.     Palpation: - tenderness with palpation greater/lesser tuberosity, AC bicipital groove and deltoid muscle  Functional Mobility/Transfers: IND    Posture: forward head, rounded shoulders    Bandages/Dressings/Incisions: NA      Dermatomes Normal Abnormal Comments   Top of head (C1) x     Posterior occipital region (C2) x     Side of neck (C3) x     Top of shoulder (C4) x     Lateral deltoid (C5) x     Tip of thumb (C6) x     Distal middle finger (C7) x     Distal fifth finger (C8) x     Medial forearm (T1) x         Reflexes Normal Abnormal Comments   C5-6 Biceps x     C5-6 Brachioradialis      C7-8 Triceps x     Olivers      Clonus          Cervical AROM screen: [x] WFL  [] abnormal:     PROM AROM    L R L R   Cervical Flexion        Cervical Extension        Cervical Rotation       Cervical Side-bend       Shoulder Flexion    WNL no p WNL   Shoulder Abduction    WNL no p WNL   Shoulder External Rotation    WNL WNL   Shoulder Internal Rotation    WNL WNL   Elbow Flexion    WNL WNL   Elbow Extension        Pronation        Supination        Wrist Flexion        Wrist Extension        Radial Deviation        Ulnar Deviation            Strength (0-5) Left Right    Shoulder Shrug (C4)     Shoulder Flex 4- P 4   Shoulder Abd (C5) 4 4   Shoulder ER 3+ P 4   Shoulder IR 4 P 4   Biceps (C6) 4 4   Triceps (C7) 4 4   Lats     Middle Trap     Rhomboids     Lower Trap      Pronation      Supination      Wrist Flex (C7)     Wrist Ext (C6)     Wrist Radial Deviation     Wrist Ulnar Deviation                    Joint mobility:    []Normal    [x]Hypo   []Hyper      Orthopaedic Special Tests Positive  Negative  NT Comments    Shoulder        Neer   -     Flynn-Perkins  -     Empty Can  -     Drop Arm  -     [place]'s  -     Speeds   -     Sulcus        Apprehension (Anterior / Posterior)  -     Load and Shift              Elbow        Cozen's       Varus Stress       Valgus Stress        Tinel's                                                       [x] Patient history, allergies, meds reviewed. Medical chart reviewed. See intake form. Review Of Systems (ROS):  [x]Performed Review of systems (Integumentary, CardioPulmonary, Neurological) by intake and observation. Intake form has been scanned into medical record. Patient has been instructed to contact their primary care physician regarding ROS issues if not already being addressed at this time.       Co-morbidities/Complexities (which will affect course of rehabilitation):   []None        []Hx of COVID   Arthritic conditions   []Rheumatoid arthritis (M05.9)  [x]Osteoarthritis (M19.91)  []Gout   Cardiovascular conditions   [x]Hypertension (I10)  []Hyperlipidemia (E78.5)  []Angina pectoris (I20)  []Atherosclerosis (I70)  []Pacemaker  []Hx of CABG/stent/  cardiac surgeries   Musculoskeletal conditions   []Disc pathology   []Congenital spine pathologies   []Osteoporosis (M81.8)  []Osteopenia (M85.8)  []Scoliosis       Endocrine conditions   []Hypothyroid (E03.9)  []Hyperthyroid Gastrointestinal conditions   []Constipation (N33.76)   Metabolic conditions   []Morbid obesity (E66.01)  []Diabetes type 1(E10.65) or 2 (E11.65)   []Neuropathy (G60.9)     Cardio/Pulmonary conditions   []Asthma (J45)  []Coughing   []COPD (J44.9)  []CHF  []A-fib   Psychological Disorders  [x]Anxiety (F41.9)  [x]Depression (F32.9)   []Other:   Developmental Disorders  []Autism (F84.0)  []CP (G80)  []Down Syndrome (Q90.9)  []Developmental delay     Neurological conditions  []Prior Stroke (I69.30)  []Parkinson's (G20)  []Encephalopathy (G93.40)  []MS (G35)  []Post-polio (G14)  []SCI  []TBI  []ALS Other conditions  []Fibromyalgia (M79.7)  []Vertigo  []Syncope  []Kidney Failure  []Cancer      []currently undergoing                treatment  []Pregnancy  []Incontinence   Prior surgeries  []involved limb  []previous spinal surgery  [] section birth  []hysterectomy  []bowel / bladder surgery  []other relevant surgeries   []Other:              Barriers to/and or personal factors that will affect rehab potential:              []Age  []Sex    []Smoker              []Motivation/Lack of Motivation                        [x]Co-Morbidities              []Cognitive Function, education/learning barriers              []Environmental, home barriers              []profession/work barriers  []past PT/medical experience  []other:  Justification:      Falls Risk Assessment (30 days):   [x] Falls Risk assessed and no intervention required. [] Falls Risk assessed and Patient requires intervention due to being higher risk   TUG score (>12s at risk):     [] Falls education provided, including       ASSESSMENT: Patient presents with L shoulder pain mainly at night if sleeping on L shoulder with strength deficits of scapular stabilizers and rotator cuff.  Patient without pain during start of initial emily but as testing continued, patient started to report achyness and pain at end of session. Patient instructed to ice and avoid overhead exercises that she performs for her exercise routine at home until further notice. Pt requires skilled intervention to restore ROM, strength, functional endurance and balance in order to perform ADLs without significant symptoms or limitations. Functional Impairments   [x]Noted spinal or UE joint hypomobility   []Noted spinal or UE joint hypermobility   [x]Decreased UE functional ROM   [x]Decreased UE functional strength   []Abnormal reflexes/sensation/myotomal/dermatomal deficits   [x]Decreased RC/scapular/core strength and neuromuscular control   []other:      Functional Activity Limitations (from functional questionnaire and intake)   []Reduced ability to tolerate prolonged functional positions   []Reduced ability or difficulty with changes of positions or transfers between positions   []Reduced ability to maintain good posture and demonstrate good body mechanics with sitting, bending, and lifting   [] Reduced ability or tolerance with driving and/or computer work   [x]Reduced ability to sleep   []Reduced ability to perform lifting, reaching, carrying tasks   []Reduced ability to tolerate impact through UE   []Reduced ability to reach behind back   []Reduced ability to  or hold objects   []Reduced ability to throw or toss an object   []other:    Participation Restrictions   [x]Reduced participation in self care activities   []Reduced participation in home management activities   []Reduced participation in work activities   []Reduced participation in social activities. []Reduced participation in sport/recreation activities. Classification:   []Signs/symptoms consistent with post-surgical status including decreased ROM, strength and function.   [x]Signs/symptoms consistent with joint sprain/strain   []Signs/symptoms consistent with shoulder impingement   []Signs/symptoms consistent with shoulder/elbow/wrist tendinopathy   []Signs/symptoms consistent with Rotator cuff tear   []Signs/symptoms consistent with labral tear   [x]Signs/symptoms consistent with postural dysfunction    []Signs/symptoms consistent with Glenohumeral IR Deficit - <45 degrees   []Signs/symptoms consistent with facet dysfunction of cervical/thoracic spine    []Signs/symptoms consistent with pathology which may benefit from Dry needling     []other:     Prognosis/Rehab Potential:      []Excellent   [x]Good    []Fair   []Poor    Tolerance of evaluation/treatment:    []Excellent   [x]Good    []Fair   []Poor    Physical Therapy Evaluation Complexity Justification  [x] A history of present problem with:  [] no personal factors and/or comorbidities that impact the plan of care;  [x]1-2 personal factors and/or comorbidities that impact the plan of care  []3 personal factors and/or comorbidities that impact the plan of care  [x] An examination of body systems using standardized tests and measures addressing any of the following: body structures and functions (impairments), activity limitations, and/or participation restrictions;:  [] a total of 1-2 or more elements   [x] a total of 3 or more elements   [] a total of 4 or more elements   [x] A clinical presentation with:  [x] stable and/or uncomplicated characteristics   [] evolving clinical presentation with changing characteristics  [] unstable and unpredictable characteristics;   [x] Clinical decision making of [x] low, [] moderate, [] high complexity using standardized patient assessment instrument and/or measurable assessment of functional outcome.     [x] EVAL (LOW) 23247 (typically 15 minutes face-to-face)  [] EVAL (MOD) 14719 (typically 30 minutes face-to-face)  [] EVAL (HIGH) 36977 (typically 45 minutes face-to-face)  [] RE-EVAL     PLAN:  Frequency/Duration:  1-2 days per week for 12 Weeks:  INTERVENTIONS:  [x] Therapeutic exercise including: strength training, ROM, for Upper extremity and core   [x]  NMR activation and proprioception for UE, scap and Core   [x] Manual therapy as indicated for shoulder, scapula and spine to include: Dry Needling/IASTM, STM, PROM, Gr I-IV mobilizations, manipulation. [x] Modalities as needed that may include: thermal agents, E-stim, Biofeedback, US, iontophoresis as indicated  [x] Patient education on joint protection, postural re-education, activity modification, progression of HEP. HEP instruction: Written HEP instructions provided and reviewed   Access Code: [email or web address]  URL: [employer]br/  Date: 07/11/2022  Prepared by: Maria Dolores Morel    Exercises  Corner Pec Major Stretch - 2 x daily - 7 x weekly - 1 sets - 5 reps - 30 hold  Standing Shoulder Internal Rotation Stretch with Towel - 2 x daily - 7 x weekly - 1 sets - 5 reps - 30 hold  Standing Shoulder Row with Anchored Resistance - 2 x daily - 7 x weekly - 2-3 sets - 10 reps - 5 hold  Shoulder extension with resistance - Neutral - 2 x daily - 7 x weekly - 2-3 sets - 10 reps - 5 hold  Shoulder External Rotation and Scapular Retraction with Resistance - 2 x daily - 7 x weekly - 2-3 sets - 10 reps - 5 hold    GOALS:  Patient stated goal: Sleep without pain  [] Progressing: [] Met: [] Not Met: [] Adjusted    Therapist goals for Patient:   Short Term Goals: To be achieved in: 2 weeks  1. Independent in HEP and progression per patient tolerance, in order to prevent re-injury. [] Progressing: [] Met: [] Not Met: [] Adjusted  2. Patient will have a decrease in pain to facilitate improvement in movement, function, and ADLs as indicated by Functional Deficits. [] Progressing: [] Met: [] Not Met: [] Adjusted    Long Term Goals: To be achieved in: 12 weeks  1. FOTO score of at least 75 to assist with reaching prior level of function. [] Progressing: [] Met: [] Not Met: [] Adjusted  2.  Patient will demonstrate increased AROM to WNL  to allow for proper joint functioning as indicated by patients Functional Deficits. [] Progressing: [] Met: [] Not Met: [] Adjusted  3. Patient will demonstrate an increase in Strength to 4/5 L shld to allow for proper functional mobility as indicated by patients Functional Deficits. [] Progressing: [] Met: [] Not Met: [] Adjusted  4. Patient will return to functional activities including sleeping on L shld without increased symptoms or restriction. [] Progressing: [] Met: [] Not Met: [] Adjusted  5.  Patient to be able to resume exercise routing at home for UE strengthening without pain noted at night.(patient specific functional goal)    [] Progressing: [] Met: [] Not Met: [] Adjusted     Electronically signed by:  Bukc Swift, [address] [place] [address] Road

## 2022-07-11 NOTE — FLOWSHEET NOTE
[address] [facility]  Phone: [phone]   Fax: [phone]    Physical Therapy Treatment Note/ Progress Report:     Date:  2022    Patient Name:  Livia Baker    :  1970  MRN: [id number]  Restrictions/Precautions:    Medical/Treatment Diagnosis Information:  · Diagnosis: M25.512 (ICD-10-CM) - Left shoulder pain, unspecified chronicity  · Treatment Diagnosis: M25.512 (ICD-10-CM) - Left shoulder pain, unspecified chronicity  Insurance/Certification information:  PT Insurance Information: Meyers/30 visit/0 used/ No auth unless >30 visits  Physician Information:  Benjamin Palacio MD    Plan of care signed (Y/N): []  Yes []  No     Date of Patient follow up with Physician: ?     Progress Report: []  Yes  []  No     Date Range for reporting period:  Beginnin22  Ending:     Progress report due (10 Rx/or 30 days whichever is less): visit #10 or  (date)     Recertification due (POC duration/ or 90 days whichever is less): visit # or 10/3/22 (date)     Visit # Insurance Allowable Auth required?  Date Range   1 Meyers 30 visits []  Yes  [x]  No        Units approved Units used Date Range          Latex Allergy:  [x]NO      []YES  Preferred Language for Healthcare:   [x]English       []other:    Functional Scale:           Date assessed:  FOTO physical FS primary measure score = 76; risk adjusted = 53  22     Pain level:  7-8/10     SUBJECTIVE:  See eval    OBJECTIVE: See eval   Observation:    Test measurements:      RESTRICTIONS/PRECAUTIONS: HTN, OA B knees, Anxiety/Depression    Exercises/Interventions:   Therapeutic Exercise (88792) Resistance / level Sets / Seconds  Reps Notes/Cues   Instructed and performed all exercises as stated below for HEP  15'                                                                           Therapeutic Activities (41255)                                          Neuromuscular Re-ed (26323)       Patient education on modification of activities, HEP, anatomy of shoulder and function of the RC  10'                                  Manual Intervention (91719)       Shld /GH Mobs 5'      Post Cap mobs       Thoracic/Rib manipulation       CT MT/Mobs       PROM MT 5'                 Modalities:  Instructed to Ice at home    Pt. Education:  -pt educated on diagnosis, prognosis and expectations for rehab  -all pt questions were answered    Home Exercise Program:  HEP instruction: Written HEP instructions provided and reviewed   Access Code: [email or web address]  URL: [email or web address]  Date: 07/11/2022  Prepared by: Maria Dolores Morel     Exercises  Corner Pec Major Stretch - 2 x daily - 7 x weekly - 1 sets - 5 reps - 30 hold  Standing Shoulder Internal Rotation Stretch with Towel - 2 x daily - 7 x weekly - 1 sets - 5 reps - 30 hold  Standing Shoulder Row with Anchored Resistance - 2 x daily - 7 x weekly - 2-3 sets - 10 reps - 5 hold  Shoulder extension with resistance - Neutral - 2 x daily - 7 x weekly - 2-3 sets - 10 reps - 5 hold  Shoulder External Rotation and Scapular Retraction with Resistance - 2 x daily - 7 x weekly - 2-3 sets - 10 reps - 5 hold       Therapeutic Exercise and NMR EXR  [x] (86966) Provided verbal/tactile cueing for activities related to strengthening, flexibility, endurance, ROM  for improvements in scapular, scapulothoracic and UE control with self care, reaching, carrying, lifting, house/yardwork, driving/computer work.     [x] (83186) Provided verbal/tactile cueing for activities related to improving balance, coordination, kinesthetic sense, posture, motor skill, proprioception  to assist with  scapular, scapulothoracic and UE control with self care, reaching, carrying, lifting, house/yardwork, driving/computer work.  [] (04206) Therapist is in constant attendance of 2 or more patients providing skilled therapy interventions, but not providing any significant amount of measurable one-on-one time to either patient, for improvements in cervical, scapular, scapulothoracic and UE control with self care, reaching, carrying, lifting, house/yardwork, driving, computer work. Therapeutic Activities:    [] (96307 or 72082) Provided verbal/tactile cueing for activities related to improving balance, coordination, kinesthetic sense, posture, motor skill, proprioception and motor activation to allow for proper function of scapular, scapulothoracic and UE control with self care, carrying, lifting, driving/computer work.      Home Exercise Program:    [x] (46127) Reviewed/Progressed HEP activities related to strengthening, flexibility, endurance, ROM of scapular, scapulothoracic and UE control with self care, reaching, carrying, lifting, house/yardwork, driving/computer work  [] (19678) Reviewed/Progressed HEP activities related to improving balance, coordination, kinesthetic sense, posture, motor skill, proprioception of scapular, scapulothoracic and UE control with self care, reaching, carrying, lifting, house/yardwork, driving/computer work      Manual Treatments:  PROM / STM / Oscillations-Mobs:  G-I, II, III, IV (PA's, Inf., Post.)  [x] (92479) Provided manual therapy to mobilize soft tissue/joints of cervical/CT, scapular GHJ and UE for the purpose of modulating pain, promoting relaxation,  increasing ROM, reducing/eliminating soft tissue swelling/inflammation/restriction, improving soft tissue extensibility and allowing for proper ROM for normal function with self care, reaching, carrying, lifting, house/yardwork, driving/computer work      Charges:  Timed Code Treatment Minutes: 30'   Total Treatment Minutes: 50'       [x] EVAL - LOW (66466)   [] EVAL - MOD (52254)  [] EVAL - HIGH (03838)  [] RE-EVAL (91360)  [x] GM(31623) x 1      [] Ionto  [x] NMR (69540) x 1    [] Vaso  [] Manual (05510) x       [] Ultrasound  [] TA x        [] Mech Traction (68706)  [] Aquatic Therapy x     [] ES (un) (58306):   [] Home Management Training x  [] ES(attended) (54090)   [] Dry Needling 1-2 muscles (28442):  [] Dry Needling 3+ muscles (108426  [] Group:      [] Other:                      GOALS:  Patient stated goal: Sleep without pain  []? Progressing: []? Met: []? Not Met: []? Adjusted     Therapist goals for Patient:   Short Term Goals: To be achieved in: 2 weeks  1. Independent in HEP and progression per patient tolerance, in order to prevent re-injury. []? Progressing: []? Met: []? Not Met: []? Adjusted  2. Patient will have a decrease in pain to facilitate improvement in movement, function, and ADLs as indicated by Functional Deficits. []? Progressing: []? Met: []? Not Met: []? Adjusted     Long Term Goals: To be achieved in: 12 weeks  1. FOTO score of at least 75 to assist with reaching prior level of function. []? Progressing: []? Met: []? Not Met: []? Adjusted  2. Patient will demonstrate increased AROM to WNL  to allow for proper joint functioning as indicated by patients Functional Deficits. []? Progressing: []? Met: []? Not Met: []? Adjusted  3. Patient will demonstrate an increase in Strength to 4/5 L shld to allow for proper functional mobility as indicated by patients Functional Deficits. []? Progressing: []? Met: []? Not Met: []? Adjusted  4. Patient will return to functional activities including sleeping on L shld without increased symptoms or restriction. []? Progressing: []? Met: []? Not Met: []? Adjusted  5. Patient to be able to resume exercise routing at home for UE strengthening without pain noted at night.(patient specific functional goal)    []? Progressing: []? Met: []? Not Met: []? Adjusted          Overall Progression Towards Functional goals/ Treatment Progress Update:  [] Patient is progressing as expected towards functional goals listed. [] Progression is slowed due to complexities/Impairments listed. [] Progression has been slowed due to co-morbidities.   [x] Plan just implemented, too soon to assess goals progression <30days   [] Goals require adjustment due to lack of progress  [] Patient is not progressing as expected and requires additional follow up with physician  [] Other    Persisting Functional Limitations/Impairments:  []Sitting []Standing   []Transfers  [x]Sleeping   []Reaching []Lifting   []ADLs []Housework  []Driving []Job related tasks  []Sports/Recreation []Other:    ASSESSMENT:  Patient presents with L shoulder pain mainly at night if sleeping on L shoulder with strength deficits of scapular stabilizers and rotator cuff. Patient without pain during start of initial eval but as testing continued, patient started to report achyness and pain at end of session. Patient instructed to ice and avoid overhead exercises that she performs for her exercise routine at home until further notice. Pt requires skilled intervention to restore ROM, strength, functional endurance and balance in order to perform ADLs without significant symptoms or limitations. Treatment/Activity Tolerance:  [x] Pt able to complete treatment [] Patient limited by fatique  [] Patient limited by pain  [] Patient limited by other medical complications  [] Other:     Prognosis:  [] Good [] Fair  [] Poor    Patient Requires Follow-up: [x] Yes  [] No    Return to Play:    [x]  N/A     []  Stage 1: Intro to Strength   []  Stage 2: Dynamic Strength and Intro to Plyometrics   []  Stage 3: Advanced Plyometrics and Intro to Throwing   []  Stage 4: Sport specific Training/Return to Sport     []  Ready to Return to Play, [employer]   []  Not Ready for Return to Sports   Comments:      PLAN: See eval. PT 1-2x / week for 12 weeks.   [] Continue per plan of care [] Alter current plan (see comments)  [x] Plan of care initiated [] Hold pending MD visit [] Discharge    Electronically signed by: [place], [id number]      Note: If patient does not return for scheduled/ recommended follow up visits, this note will serve as a discharge from care along with most recent update on progress.

## 2022-07-18 ENCOUNTER — HOSPITAL ENCOUNTER (OUTPATIENT)
Dept: PHYSICAL THERAPY | Age: 52
Setting detail: THERAPIES SERIES
Discharge: HOME | End: 2022-07-18
Payer: MEDICAID

## 2022-07-18 PROCEDURE — 97110 THERAPEUTIC EXERCISES: CPT | Performed by: PHYSICAL THERAPIST

## 2022-07-18 PROCEDURE — 97140 MANUAL THERAPY 1/> REGIONS: CPT | Performed by: PHYSICAL THERAPIST

## 2022-07-18 PROCEDURE — 97112 NEUROMUSCULAR REEDUCATION: CPT | Performed by: PHYSICAL THERAPIST

## 2022-07-18 NOTE — FLOWSHEET NOTE
[address] [place] [address]  Phone: [phone]   Fax: [phone]    Physical Therapy Treatment Note/ Progress Report:     Date:  2022    Patient Name:  Ashley Montalvo    :  1970  MRN: [id number]  Restrictions/Precautions:    Medical/Treatment Diagnosis Information:  Diagnosis: M25.512 (ICD-10-CM) - Left shoulder pain, unspecified chronicity  Treatment Diagnosis: M25.512 (ICD-10-CM) - Left shoulder pain, unspecified chronicity  Insurance/Certification information:  PT Insurance Information: Meyers/30 visit/0 used/ No auth unless >30 visits  Physician Information:  Felice Macias MD    Plan of care signed (Y/N): []  Yes []  No     Date of Patient follow up with Physician: ?     Progress Report: []  Yes  []  No     Date Range for reporting period:  Beginnin22  Ending:     Progress report due (10 Rx/or 30 days whichever is less): visit #10 or 03 (date)     Recertification due (POC duration/ or 90 days whichever is less): visit # or 10/3/22 (date)     Visit # Insurance Allowable Auth required? Date Range   2 Meyers 30 visits []  Yes  [x]  No        Units approved Units used Date Range          Latex Allergy:  [x]NO      []YES  Preferred Language for Healthcare:   [x]English       []other:    Functional Scale:           Date assessed:  FOTO physical FS primary measure score = 76; risk adjusted = 53  22     Pain level:  4-5/10     SUBJECTIVE: Reports night pain has been less in general when sleeping on it. Reports more of an achy sensation versus sharp pain.      OBJECTIVE: See eval  Observation:   Test measurements:      RESTRICTIONS/PRECAUTIONS: HTN, OA B knees, Anxiety/Depression    Exercises/Interventions:   Therapeutic Exercise (28573) Resistance / level Sets / Seconds  Reps Notes/Cues   Instructed and performed all exercises as stated below for HEP             Supine SA 2#  2x15 reps    SL shld abd 2# H5 2x10 reps    SL horizontal abd No wt H5 2x10   Had pain using weight. Some discomfort noted afterwards. SL ER No wt h5 2x10 reps           Prone shld ext 1# h5 2x10 reps                  Therapeutic Activities (55158)                                          Neuromuscular Re-ed (24150)       Patient education on modification of activities, HEP, anatomy of shoulder and function of the RC  10'                                  Manual Intervention (16386)       Shld /GH Mobs 5'      Post Cap mobs       Thoracic/Rib manipulation       CT MT/Mobs       PROM MT 5'                 Modalities:  Instructed to Ice at home    Pt. Education:  -pt educated on diagnosis, prognosis and expectations for rehab  -all pt questions were answered    Home Exercise Program:  HEP instruction: Written HEP instructions provided and reviewed   Access Code: [email or web address]  URL: [email or web address]  Date: 07/11/2022  Prepared by: Mily Perish     Exercises  Corner Pec Major Stretch - 2 x daily - 7 x weekly - 1 sets - 5 reps - 30 hold  Standing Shoulder Internal Rotation Stretch with Towel - 2 x daily - 7 x weekly - 1 sets - 5 reps - 30 hold  Standing Shoulder Row with Anchored Resistance - 2 x daily - 7 x weekly - 2-3 sets - 10 reps - 5 hold  Shoulder extension with resistance - Neutral - 2 x daily - 7 x weekly - 2-3 sets - 10 reps - 5 hold  Shoulder External Rotation and Scapular Retraction with Resistance - 2 x daily - 7 x weekly - 2-3 sets - 10 reps - 5 hold       Therapeutic Exercise and NMR EXR  [x] (42588) Provided verbal/tactile cueing for activities related to strengthening, flexibility, endurance, ROM  for improvements in scapular, scapulothoracic and UE control with self care, reaching, carrying, lifting, house/yardwork, driving/computer work.     [x] (27354) Provided verbal/tactile cueing for activities related to improving balance, coordination, kinesthetic sense, posture, motor skill, proprioception  to assist with  scapular, scapulothoracic and UE control with self care, reaching, carrying, lifting, house/yardwork, driving/computer work.  [] (79012) Therapist is in constant attendance of 2 or more patients providing skilled therapy interventions, but not providing any significant amount of measurable one-on-one time to either patient, for improvements in cervical, scapular, scapulothoracic and UE control with self care, reaching, carrying, lifting, house/yardwork, driving, computer work. Therapeutic Activities:    [] (84796 or 49017) Provided verbal/tactile cueing for activities related to improving balance, coordination, kinesthetic sense, posture, motor skill, proprioception and motor activation to allow for proper function of scapular, scapulothoracic and UE control with self care, carrying, lifting, driving/computer work.      Home Exercise Program:    [x] (37032) Reviewed/Progressed HEP activities related to strengthening, flexibility, endurance, ROM of scapular, scapulothoracic and UE control with self care, reaching, carrying, lifting, house/yardwork, driving/computer work  [] (45541) Reviewed/Progressed HEP activities related to improving balance, coordination, kinesthetic sense, posture, motor skill, proprioception of scapular, scapulothoracic and UE control with self care, reaching, carrying, lifting, house/yardwork, driving/computer work      Manual Treatments:  PROM / STM / Oscillations-Mobs:  G-I, II, III, IV (PA's, Inf., Post.)  [x] (74504) Provided manual therapy to mobilize soft tissue/joints of cervical/CT, scapular GHJ and UE for the purpose of modulating pain, promoting relaxation,  increasing ROM, reducing/eliminating soft tissue swelling/inflammation/restriction, improving soft tissue extensibility and allowing for proper ROM for normal function with self care, reaching, carrying, lifting, house/yardwork, driving/computer work      Charges:  Timed Code Treatment Minutes: 45'   Total Treatment Minutes: 54'       [] EVAL - LOW (15906)   [] EVAL - MOD (07937)  [] EVAL - HIGH (71651)  [] RE-EVAL (15385)  [x] GY(51592) x 1      [] Ionto  [x] NMR (74574) x 1    [] Vaso  [x] Manual (25952) x  1     [] Ultrasound  [] TA x        [] Mech Traction (53591)  [] Aquatic Therapy x     [] ES (un) (74422):   [] Home Management Training x  [] ES(attended) (53191)   [] Dry Needling 1-2 muscles (93808):  [] Dry Needling 3+ muscles (388839  [] Group:      [] Other:                      GOALS:  Patient stated goal: Sleep without pain  [] Progressing: [] Met: [] Not Met: [] Adjusted     Therapist goals for Patient:   Short Term Goals: To be achieved in: 2 weeks  1. Independent in HEP and progression per patient tolerance, in order to prevent re-injury. [] Progressing: [] Met: [] Not Met: [] Adjusted  2. Patient will have a decrease in pain to facilitate improvement in movement, function, and ADLs as indicated by Functional Deficits. [] Progressing: [] Met: [] Not Met: [] Adjusted     Long Term Goals: To be achieved in: 12 weeks  1. FOTO score of at least 75 to assist with reaching prior level of function. [] Progressing: [] Met: [] Not Met: [] Adjusted  2. Patient will demonstrate increased AROM to WNL  to allow for proper joint functioning as indicated by patients Functional Deficits. [] Progressing: [] Met: [] Not Met: [] Adjusted  3. Patient will demonstrate an increase in Strength to 4/5 L shld to allow for proper functional mobility as indicated by patients Functional Deficits. [] Progressing: [] Met: [] Not Met: [] Adjusted  4. Patient will return to functional activities including sleeping on L shld without increased symptoms or restriction. [] Progressing: [] Met: [] Not Met: [] Adjusted  5.  Patient to be able to resume exercise routing at home for UE strengthening without pain noted at night.(patient specific functional goal)    [] Progressing: [] Met: [] Not Met: [] Adjusted          Overall Progression Towards Functional goals/ Treatment Progress Update:  [] Patient is progressing as expected towards functional goals listed. [] Progression is slowed due to complexities/Impairments listed. [] Progression has been slowed due to co-morbidities. [x] Plan just implemented, too soon to assess goals progression <30days   [] Goals require adjustment due to lack of progress  [] Patient is not progressing as expected and requires additional follow up with physician  [] Other    Persisting Functional Limitations/Impairments:  []Sitting []Standing   []Transfers  [x]Sleeping   []Reaching []Lifting   []ADLs []Housework  []Driving []Job related tasks  []Sports/Recreation []Other:    ASSESSMENT:  Patient presents with L shoulder pain mainly at night if sleeping on L shoulder with strength deficits of scapular stabilizers and rotator cuff. Patient with discomfort noted this visit with SL shld horizontal abd. Assess response to sleeping tonight. Patient instructed to ice and avoid overhead exercises that she performs for her exercise routine at home until further notice. Pt requires skilled intervention to restore ROM, strength, functional endurance and balance in order to perform ADLs without significant symptoms or limitations. Treatment/Activity Tolerance:  [x] Pt able to complete treatment [] Patient limited by fatique  [] Patient limited by pain  [] Patient limited by other medical complications  [] Other:     Prognosis:  [] Good [] Fair  [] Poor    Patient Requires Follow-up: [x] Yes  [] No    Return to Play:    [x]  N/A     []  Stage 1: Intro to Strength   []  Stage 2: Dynamic Strength and Intro to Plyometrics   []  Stage 3: Advanced Plyometrics and Intro to Throwing   []  Stage 4: Sport specific Training/Return to Sport     []  Ready to Return to Play, [facility] [employer]   []  Not Ready for Return to Sports   Comments:      PLAN: See eval. PT 1-2x / week for 12 weeks.   [x] Continue per plan of care [] Alter current plan (see comments)  [] Plan of care initiated [] Hold pending MD visit [] Discharge    Electronically signed by: [place], [id number]      Note: If patient does not return for scheduled/ recommended follow up visits, this note will serve as a discharge from care along with most recent update on progress.

## 2022-07-22 ENCOUNTER — APPOINTMENT (OUTPATIENT)
Dept: PHYSICAL THERAPY | Age: 52
End: 2022-07-22
Payer: MEDICAID

## 2022-07-25 ENCOUNTER — TELEPHONE (OUTPATIENT)
Dept: ORTHOPEDIC SURGERY | Age: 52
End: 2022-07-25

## 2022-07-25 ENCOUNTER — HOSPITAL ENCOUNTER (OUTPATIENT)
Dept: PHYSICAL THERAPY | Age: 52
Setting detail: THERAPIES SERIES
Discharge: HOME | End: 2022-07-25
Payer: MEDICAID

## 2022-07-25 DIAGNOSIS — M25.512 LEFT SHOULDER PAIN, UNSPECIFIED CHRONICITY: Primary | ICD-10-CM

## 2022-07-25 NOTE — TELEPHONE ENCOUNTER
Pt stopped in, states therapy is not working and would like to get MRI set up.  Pls call [phone] (home)

## 2022-07-26 NOTE — TELEPHONE ENCOUNTER
Left VM letting pt know we can put in a referral for an MRI I just need to know where she wants to go to have it done

## 2022-07-27 ENCOUNTER — APPOINTMENT (OUTPATIENT)
Dept: PHYSICAL THERAPY | Age: 52
End: 2022-07-27
Payer: MEDICAID

## 2022-07-27 NOTE — TELEPHONE ENCOUNTER
Left VM letting Pt know that I put the MRI in for her left shoulder at [facility] per Pt's request. I provided her the number to call and schedule.  Asked pt tp call after MRI to schedule her f/u

## 2022-08-01 ENCOUNTER — TELEPHONE (OUTPATIENT)
Dept: ORTHOPEDIC SURGERY | Age: 52
End: 2022-08-01

## 2022-08-01 ENCOUNTER — HOSPITAL ENCOUNTER (OUTPATIENT)
Dept: PHYSICAL THERAPY | Age: 52
Setting detail: THERAPIES SERIES
Discharge: HOME | End: 2022-08-01

## 2022-08-01 NOTE — FLOWSHEET NOTE
Physical Therapy  Cancellation/No-show Note  Patient Name:  Klarissa Easton  :  1970   Date:  2022  Cancelled visits to date: 2  No-shows to date: 0    Patient status for today's appointment patient:  [x]  Cancelled  []  Rescheduled appointment  []  No-show     Reason given by patient:  []  Patient ill  []  Conflicting appointment  []  No transportation    []  Conflict with work  []  No reason given  []  Other:     Comments:  Patient cancelled all visits until after her MRI which is scheduled for 22. Phone call information:   []  Phone call made today to patient at _ time at number provided:      []  Patient answered, conversation as follows:    []  Patient did not answer, message left as follows:  []  Phone call not made today  [x]  Phone call not needed - pt contacted us to cancel and provided reason for cancellation.      Electronically signed by:  Can Vasquez, [address] [place] [address]

## 2022-08-01 NOTE — TELEPHONE ENCOUNTER
Left  VM letting Pt know that her MRI was approved for [facility]    I gave her the number to call to schedule.  Sje is to call back to set up a f/u appt to go over the results

## 2022-08-12 ENCOUNTER — HOSPITAL ENCOUNTER (OUTPATIENT)
Dept: PHYSICAL THERAPY | Age: 52
Setting detail: THERAPIES SERIES
Discharge: HOME | End: 2022-08-12

## 2022-08-12 NOTE — FLOWSHEET NOTE
Physical Therapy  Cancellation/No-show Note  Patient Name:  Ashia Ayon  :  1970   Date:  2022  Cancelled visits to date: 1  No-shows to date: 1    Patient status for today's appointment patient:  [x]  Cancelled  []  Rescheduled appointment  [x]  No-show:      Reason given by patient:  []  Patient ill  []  Conflicting appointment  []  No transportation    []  Conflict with work  [x]  No reason given  []  Other:     Comments:      Phone call information:   []  Phone call made today to patient at 3:45pm at number provided:      []  Patient answered, conversation as follows:    [x]  Patient did not answer, message left as follows: Pt reminded of last scheduled appt today and educated to call back to re-scheduled as needed. []  Phone call not made today  []  Phone call not needed - pt contacted us to cancel and provided reason for cancellation.      Electronically signed by:  Aurea De La O, PT, DPT

## 2022-08-15 ENCOUNTER — TELEPHONE (OUTPATIENT)
Dept: ORTHOPEDIC SURGERY | Age: 52
End: 2022-08-15

## 2022-08-15 DIAGNOSIS — F40.240 CLAUSTROPHOBIA: Primary | ICD-10-CM

## 2022-08-15 RX ORDER — DIAZEPAM 5 MG/1
TABLET ORAL
Qty: 1 TABLET | Refills: 0 | Status: SHIPPED | OUTPATIENT
Start: 2022-08-15 | End: 2022-08-26

## 2022-09-15 DIAGNOSIS — I10 HTN, GOAL BELOW 140/90: ICD-10-CM

## 2022-09-15 RX ORDER — ATENOLOL 100 MG/1
100 TABLET ORAL DAILY
Qty: 90 TABLET | Refills: 1 | Status: SHIPPED | OUTPATIENT
Start: 2022-09-15

## 2022-09-15 NOTE — TELEPHONE ENCOUNTER
Medication:   Requested Prescriptions     Pending Prescriptions Disp Refills    atenolol (TENORMIN) 100 MG tablet 90 tablet 1     Sig: Take 1 tablet by mouth daily     Last Filled:  5/4/22    Last appt: 5/4/2022   Next appt: Visit date not found    Last Lipid:   Lab Results   Component Value Date/Time    CHOL 187 11/23/2021 10:44 AM    TRIG 61 11/23/2021 10:44 AM    HDL 64 11/23/2021 10:44 AM    LDLCALC 111 11/23/2021 10:44 AM

## 2022-11-09 ENCOUNTER — TELEPHONE (OUTPATIENT)
Dept: INTERNAL MEDICINE CLINIC | Age: 52
End: 2022-11-09

## 2022-11-09 DIAGNOSIS — E78.5 HYPERLIPIDEMIA LDL GOAL <130: Primary | ICD-10-CM

## 2022-11-09 DIAGNOSIS — I10 ESSENTIAL HYPERTENSION: ICD-10-CM

## 2022-11-09 NOTE — TELEPHONE ENCOUNTER
Patient returned the call and is scheduled for 12/06/22 for her annual physical.  Patient is requesting an order for fasting labs so she can have those done prior to her appt.      *labs pended

## 2022-11-14 DIAGNOSIS — I10 HTN, GOAL BELOW 140/90: ICD-10-CM

## 2022-11-14 RX ORDER — IRBESARTAN 150 MG/1
TABLET ORAL
Qty: 90 TABLET | Refills: 0 | Status: SHIPPED | OUTPATIENT
Start: 2022-11-14

## 2022-11-14 NOTE — TELEPHONE ENCOUNTER
Medication:   Requested Prescriptions     Pending Prescriptions Disp Refills    irbesartan (AVAPRO) 150 MG tablet 90 tablet 0     Sig: Take 1 tablet by mouth once daily     Last Filled:  5/4/22    Last appt: 5/4/2022   Next appt: 12/6/2022    Last Lipid:   Lab Results   Component Value Date/Time    CHOL 187 11/23/2021 10:44 AM    TRIG 61 11/23/2021 10:44 AM    HDL 64 11/23/2021 10:44 AM    LDLCALC 111 11/23/2021 10:44 AM

## 2022-11-16 ENCOUNTER — HOSPITAL ENCOUNTER (OUTPATIENT)
Dept: WOMENS IMAGING | Age: 52
Discharge: HOME | End: 2022-11-16
Payer: MEDICAID

## 2022-11-16 VITALS — WEIGHT: 205 LBS | BODY MASS INDEX: 34.16 KG/M2 | HEIGHT: 65 IN

## 2022-11-16 DIAGNOSIS — Z12.31 BREAST CANCER SCREENING BY MAMMOGRAM: ICD-10-CM

## 2022-11-16 PROCEDURE — 77063 BREAST TOMOSYNTHESIS BI: CPT

## 2022-11-30 DIAGNOSIS — I10 ESSENTIAL HYPERTENSION: ICD-10-CM

## 2022-11-30 DIAGNOSIS — E78.5 HYPERLIPIDEMIA LDL GOAL <130: ICD-10-CM

## 2022-11-30 LAB
A/G RATIO: 1.3 (ref 1.1–2.2)
ALBUMIN SERPL-MCNC: 4.3 G/DL (ref 3.4–5)
ALP BLD-CCNC: 105 U/L (ref 40–129)
ALT SERPL-CCNC: 21 U/L (ref 10–40)
ANION GAP SERPL CALCULATED.3IONS-SCNC: 14 MMOL/L (ref 3–16)
AST SERPL-CCNC: 22 U/L (ref 15–37)
BASOPHILS ABSOLUTE: 0 K/UL (ref 0–0.2)
BASOPHILS RELATIVE PERCENT: 0.8 %
BILIRUB SERPL-MCNC: <0.2 MG/DL (ref 0–1)
BUN BLDV-MCNC: 15 MG/DL (ref 7–20)
CALCIUM SERPL-MCNC: 10.4 MG/DL (ref 8.3–10.6)
CHLORIDE BLD-SCNC: 103 MMOL/L (ref 99–110)
CHOLESTEROL, FASTING: 222 MG/DL (ref 0–199)
CO2: 23 MMOL/L (ref 21–32)
CREAT SERPL-MCNC: 0.7 MG/DL (ref 0.6–1.1)
EOSINOPHILS ABSOLUTE: 0 K/UL (ref 0–0.6)
EOSINOPHILS RELATIVE PERCENT: 0.9 %
GFR SERPL CREATININE-BSD FRML MDRD: >60 ML/MIN/{1.73_M2}
GLUCOSE BLD-MCNC: 90 MG/DL (ref 70–99)
HCT VFR BLD CALC: 34.9 % (ref 36–48)
HDLC SERPL-MCNC: 62 MG/DL (ref 40–60)
HEMOGLOBIN: 11.3 G/DL (ref 12–16)
LDL CHOLESTEROL CALCULATED: 132 MG/DL
LYMPHOCYTES ABSOLUTE: 1.6 K/UL (ref 1–5.1)
LYMPHOCYTES RELATIVE PERCENT: 37.2 %
MCH RBC QN AUTO: 28.1 PG (ref 26–34)
MCHC RBC AUTO-ENTMCNC: 32.3 G/DL (ref 31–36)
MCV RBC AUTO: 86.8 FL (ref 80–100)
MONOCYTES ABSOLUTE: 0.4 K/UL (ref 0–1.3)
MONOCYTES RELATIVE PERCENT: 9 %
NEUTROPHILS ABSOLUTE: 2.3 K/UL (ref 1.7–7.7)
NEUTROPHILS RELATIVE PERCENT: 52.1 %
PDW BLD-RTO: 14.6 % (ref 12.4–15.4)
PLATELET # BLD: 345 K/UL (ref 135–450)
PMV BLD AUTO: 8.6 FL (ref 5–10.5)
POTASSIUM SERPL-SCNC: 4.6 MMOL/L (ref 3.5–5.1)
RBC # BLD: 4.02 M/UL (ref 4–5.2)
SODIUM BLD-SCNC: 140 MMOL/L (ref 136–145)
TOTAL PROTEIN: 7.5 G/DL (ref 6.4–8.2)
TRIGLYCERIDE, FASTING: 141 MG/DL (ref 0–150)
VLDLC SERPL CALC-MCNC: 28 MG/DL
WBC # BLD: 4.4 K/UL (ref 4–11)

## 2022-12-06 ENCOUNTER — OFFICE VISIT (OUTPATIENT)
Dept: INTERNAL MEDICINE CLINIC | Age: 52
End: 2022-12-06
Payer: MEDICAID

## 2022-12-06 VITALS
SYSTOLIC BLOOD PRESSURE: 130 MMHG | HEART RATE: 76 BPM | OXYGEN SATURATION: 98 % | BODY MASS INDEX: 34.11 KG/M2 | TEMPERATURE: 97.4 F | HEIGHT: 65 IN | DIASTOLIC BLOOD PRESSURE: 72 MMHG

## 2022-12-06 DIAGNOSIS — F32.89 OTHER DEPRESSION: ICD-10-CM

## 2022-12-06 DIAGNOSIS — K21.9 CHRONIC GERD: ICD-10-CM

## 2022-12-06 DIAGNOSIS — J30.9 ALLERGIC RHINITIS, UNSPECIFIED SEASONALITY, UNSPECIFIED TRIGGER: ICD-10-CM

## 2022-12-06 DIAGNOSIS — I10 HTN, GOAL BELOW 140/90: ICD-10-CM

## 2022-12-06 DIAGNOSIS — Z00.00 WELL ADULT EXAM: Primary | ICD-10-CM

## 2022-12-06 PROCEDURE — 3074F SYST BP LT 130 MM HG: CPT | Performed by: INTERNAL MEDICINE

## 2022-12-06 PROCEDURE — 3078F DIAST BP <80 MM HG: CPT | Performed by: INTERNAL MEDICINE

## 2022-12-06 PROCEDURE — 99396 PREV VISIT EST AGE 40-64: CPT | Performed by: INTERNAL MEDICINE

## 2022-12-06 PROCEDURE — 1036F TOBACCO NON-USER: CPT | Performed by: INTERNAL MEDICINE

## 2022-12-06 PROCEDURE — 3017F COLORECTAL CA SCREEN DOC REV: CPT | Performed by: INTERNAL MEDICINE

## 2022-12-06 PROCEDURE — G8427 DOCREV CUR MEDS BY ELIG CLIN: HCPCS | Performed by: INTERNAL MEDICINE

## 2022-12-06 PROCEDURE — G8484 FLU IMMUNIZE NO ADMIN: HCPCS | Performed by: INTERNAL MEDICINE

## 2022-12-06 PROCEDURE — G8417 CALC BMI ABV UP PARAM F/U: HCPCS | Performed by: INTERNAL MEDICINE

## 2022-12-06 RX ORDER — TRIAMTERENE AND HYDROCHLOROTHIAZIDE 75; 50 MG/1; MG/1
TABLET ORAL
Qty: 45 TABLET | Refills: 0 | Status: SHIPPED | OUTPATIENT
Start: 2022-12-06

## 2022-12-06 RX ORDER — ATENOLOL 100 MG/1
100 TABLET ORAL DAILY
Qty: 90 TABLET | Refills: 1 | Status: SHIPPED | OUTPATIENT
Start: 2022-12-06

## 2022-12-06 RX ORDER — CETIRIZINE HYDROCHLORIDE 10 MG/1
TABLET ORAL
Qty: 90 TABLET | Refills: 1 | Status: SHIPPED | OUTPATIENT
Start: 2022-12-06

## 2022-12-06 RX ORDER — OMEPRAZOLE 20 MG/1
20 CAPSULE, DELAYED RELEASE ORAL DAILY
Qty: 90 CAPSULE | Refills: 1 | Status: SHIPPED | OUTPATIENT
Start: 2022-12-06

## 2022-12-06 SDOH — ECONOMIC STABILITY: FOOD INSECURITY: WITHIN THE PAST 12 MONTHS, YOU WORRIED THAT YOUR FOOD WOULD RUN OUT BEFORE YOU GOT MONEY TO BUY MORE.: NEVER TRUE

## 2022-12-06 SDOH — ECONOMIC STABILITY: FOOD INSECURITY: WITHIN THE PAST 12 MONTHS, THE FOOD YOU BOUGHT JUST DIDN'T LAST AND YOU DIDN'T HAVE MONEY TO GET MORE.: NEVER TRUE

## 2022-12-06 ASSESSMENT — PATIENT HEALTH QUESTIONNAIRE - PHQ9
10. IF YOU CHECKED OFF ANY PROBLEMS, HOW DIFFICULT HAVE THESE PROBLEMS MADE IT FOR YOU TO DO YOUR WORK, TAKE CARE OF THINGS AT HOME, OR GET ALONG WITH OTHER PEOPLE: 0
7. TROUBLE CONCENTRATING ON THINGS, SUCH AS READING THE NEWSPAPER OR WATCHING TELEVISION: 0
SUM OF ALL RESPONSES TO PHQ QUESTIONS 1-9: 0
SUM OF ALL RESPONSES TO PHQ QUESTIONS 1-9: 0
SUM OF ALL RESPONSES TO PHQ9 QUESTIONS 1 & 2: 0
SUM OF ALL RESPONSES TO PHQ QUESTIONS 1-9: 0
2. FEELING DOWN, DEPRESSED OR HOPELESS: 0
6. FEELING BAD ABOUT YOURSELF - OR THAT YOU ARE A FAILURE OR HAVE LET YOURSELF OR YOUR FAMILY DOWN: 0
1. LITTLE INTEREST OR PLEASURE IN DOING THINGS: 0
4. FEELING TIRED OR HAVING LITTLE ENERGY: 0
SUM OF ALL RESPONSES TO PHQ QUESTIONS 1-9: 0
5. POOR APPETITE OR OVEREATING: 0
3. TROUBLE FALLING OR STAYING ASLEEP: 0
8. MOVING OR SPEAKING SO SLOWLY THAT OTHER PEOPLE COULD HAVE NOTICED. OR THE OPPOSITE, BEING SO FIGETY OR RESTLESS THAT YOU HAVE BEEN MOVING AROUND A LOT MORE THAN USUAL: 0
9. THOUGHTS THAT YOU WOULD BE BETTER OFF DEAD, OR OF HURTING YOURSELF: 0

## 2022-12-06 ASSESSMENT — ANXIETY QUESTIONNAIRES
GAD7 TOTAL SCORE: 1
7. FEELING AFRAID AS IF SOMETHING AWFUL MIGHT HAPPEN: 0
3. WORRYING TOO MUCH ABOUT DIFFERENT THINGS: 1
5. BEING SO RESTLESS THAT IT IS HARD TO SIT STILL: 0
6. BECOMING EASILY ANNOYED OR IRRITABLE: 0
4. TROUBLE RELAXING: 0
IF YOU CHECKED OFF ANY PROBLEMS ON THIS QUESTIONNAIRE, HOW DIFFICULT HAVE THESE PROBLEMS MADE IT FOR YOU TO DO YOUR WORK, TAKE CARE OF THINGS AT HOME, OR GET ALONG WITH OTHER PEOPLE: NOT DIFFICULT AT ALL
1. FEELING NERVOUS, ANXIOUS, OR ON EDGE: 0
2. NOT BEING ABLE TO STOP OR CONTROL WORRYING: 0

## 2022-12-06 ASSESSMENT — SOCIAL DETERMINANTS OF HEALTH (SDOH): HOW HARD IS IT FOR YOU TO PAY FOR THE VERY BASICS LIKE FOOD, HOUSING, MEDICAL CARE, AND HEATING?: NOT HARD AT ALL

## 2022-12-06 NOTE — PROGRESS NOTES
2022    Kamala Gloria (:  1970) is a 47 y.o. female, here for a preventive medicine evaluation. Patient Active Problem List   Diagnosis    Hyperlipidemia LDL goal <130    Essential hypertension     Well Adult Physical   Patient here for a comprehensive physical exam.The patient reports no problems  Do you take any herbs or supplements that were not prescribed by a doctor? no Are you taking calcium supplements? no Are you taking aspirin daily? no   History:  Pap smear due    Review of Systems   All other systems reviewed and are negative. Prior to Visit Medications    Medication Sig Taking?  Authorizing Provider   atenolol (TENORMIN) 100 MG tablet Take 1 tablet by mouth daily Yes Aina Valencia MD   omeprazole (PRILOSEC) 20 MG delayed release capsule Take 1 capsule by mouth daily For heartburn Yes Ania Valencia MD   triamterene-hydroCHLOROthiazide (MAXZIDE) 75-50 MG per tablet Take 1/2 (one-half) tablet by mouth once daily Yes Ania Valencia MD   cetirizine (ZYRTEC) 10 MG tablet Take 1 tablet by mouth once daily Yes Ania Valencia MD   Ascorbic Acid (VITAMIN C) 1000 MG tablet Take 1,000 mg by mouth daily Yes Historical Provider, MD   Multiple Vitamins-Minerals (THERAPEUTIC MULTIVITAMIN-MINERALS) tablet Take 1 tablet by mouth daily Yes Historical Provider, MD   aspirin 81 MG tablet Take 81 mg by mouth daily Yes Historical Provider, MD   Cholecalciferol (VITAMIN D3) 5000 units TABS Take by mouth Yes Historical Provider, MD        No Known Allergies    Past Medical History:   Diagnosis Date    Heart murmur     Hypertension     Morbidly obese (HCC)     Nephrolithiasis     Postmenopausal     LMP at age 44       Past Surgical History:   Procedure Laterality Date    COLONOSCOPY N/A 2021    COLONOSCOPY DIAGNOSTIC performed by Kentrell Hightower MD at [address] [facility] History     Socioeconomic History    Marital status: Single     Spouse name: Not on file    Number of children: Not on file    Years of education: Not on file    Highest education level: Not on file   Occupational History    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     Comment: [employer]   Tobacco Use    Smoking status: Never    Smokeless tobacco: Never   Vaping Use    Vaping Use: Never used   Substance and Sexual Activity    Alcohol use: No    Drug use: No    Sexual activity: Not Currently     Partners: Male   Other Topics Concern    Not on file   Social History Narrative    Lives home with her 3 daughters (19-21- 25)     Social Determinants of Health     Financial Resource Strain: Low Risk     Difficulty of Paying Living Expenses: Not hard at all   Food Insecurity: No Food Insecurity    Worried About Running Out of Food in the Last Year: Never true    Ran Out of Food in the Last Year: Never true   Transportation Needs: Not on file   Physical Activity: Not on file   Stress: Not on file   Social Connections: Not on file   Intimate Partner Violence: Not on file   Housing Stability: Not on file        Family History   Problem Relation Age of Onset    High Blood Pressure Mother     High Cholesterol Mother     Cancer Mother         uterine    High Blood Pressure Father     High Cholesterol Father     Other Maternal Grandfather         kidney failure    Hypertension Sister        ADVANCE DIRECTIVE: N, <no information>    Vitals:    12/06/22 1140   BP: 130/72   Pulse: 76   Temp: 97.4 °F (36.3 °C)   TempSrc: Temporal   SpO2: 98%   Height: 5' 5\" (1.651 m)     Estimated body mass index is 34.11 kg/m² as calculated from the following:    Height as of this encounter: 5' 5\" (1.651 m). Weight as of 11/16/22: 205 lb (93 kg). Wt Readings from Last 3 Encounters:   11/16/22 205 lb (93 kg)   06/09/22 244 lb (110.7 kg)   05/04/22 244 lb 3.2 oz (110.8 kg)       Physical Exam  Vitals and nursing note reviewed. Constitutional:       General: She is not in acute distress. Appearance: Normal appearance.  She is well-developed. She is obese. She is not diaphoretic. HENT:      Head: Normocephalic and atraumatic. Right Ear: Hearing, tympanic membrane, ear canal and external ear normal.      Left Ear: Hearing, tympanic membrane, ear canal and external ear normal.      Nose: No nasal deformity, laceration, mucosal edema, congestion or rhinorrhea. Right Sinus: No maxillary sinus tenderness or frontal sinus tenderness. Left Sinus: No maxillary sinus tenderness or frontal sinus tenderness. Mouth/Throat:      Pharynx: Uvula midline. No oropharyngeal exudate or posterior oropharyngeal erythema. Eyes:      General: Lids are normal.         Right eye: No discharge. Left eye: No discharge. Extraocular Movements: Extraocular movements intact. Conjunctiva/sclera: Conjunctivae normal.      Pupils: Pupils are equal, round, and reactive to light. Neck:      Thyroid: No thyroid mass or thyromegaly. Vascular: Normal carotid pulses. No carotid bruit, hepatojugular reflux or JVD. Trachea: Trachea and phonation normal.   Cardiovascular:      Rate and Rhythm: Normal rate and regular rhythm. Chest Wall: PMI is not displaced. Pulses: Normal pulses. Carotid pulses are 2+ on the right side and 2+ on the left side. Radial pulses are 2+ on the right side and 2+ on the left side. Heart sounds: Normal heart sounds, S1 normal and S2 normal.   Pulmonary:      Effort: Pulmonary effort is normal. No respiratory distress. Breath sounds: Normal breath sounds. No decreased breath sounds, wheezing or rhonchi. Abdominal:      General: Bowel sounds are normal. There is no distension. Palpations: Abdomen is soft. There is no mass. Tenderness: There is no abdominal tenderness. There is no guarding or rebound. Comments: No HSM   Musculoskeletal:         General: Normal range of motion.       Right shoulder: Normal.      Left shoulder: Normal.      Cervical back: Full passive range of motion without pain, normal range of motion and neck supple. Right hip: Normal.      Left hip: Normal.      Right knee: Normal.      Left knee: Normal.   Lymphadenopathy:      Head:      Right side of head: No submental, submandibular, tonsillar, preauricular, posterior auricular or occipital adenopathy. Left side of head: No submental, submandibular, tonsillar, preauricular, posterior auricular or occipital adenopathy. Cervical: No cervical adenopathy. Right cervical: No superficial, deep or posterior cervical adenopathy. Left cervical: No superficial, deep or posterior cervical adenopathy. Skin:     General: Skin is warm. Capillary Refill: Capillary refill takes less than 2 seconds. Nails: There is no clubbing. Neurological:      General: No focal deficit present. Mental Status: She is alert and oriented to person, place, and time. Mental status is at baseline. GCS: GCS eye subscore is 4. GCS verbal subscore is 5. GCS motor subscore is 6. Cranial Nerves: No cranial nerve deficit. Sensory: No sensory deficit. Deep Tendon Reflexes: Reflexes are normal and symmetric. Reflex Scores:       Tricep reflexes are 2+ on the right side and 2+ on the left side. Bicep reflexes are 2+ on the right side and 2+ on the left side. Psychiatric:         Mood and Affect: Mood normal.         Speech: Speech normal.         Behavior: Behavior normal. Behavior is not slowed. Thought Content: Thought content normal.         Judgment: Judgment normal.       No flowsheet data found.     Lab Results   Component Value Date/Time    CHOL 187 11/23/2021 10:44 AM    CHOL 214 11/04/2020 11:07 AM    CHOL 212 09/24/2019 10:14 AM    CHOLFAST 222 11/30/2022 11:31 AM    TRIG 61 11/23/2021 10:44 AM    TRIG 68 11/04/2020 11:07 AM    TRIG 77 09/24/2019 10:14 AM    TRIGLYCFAST 141 11/30/2022 11:31 AM    HDL 62 11/30/2022 11:31 AM    HDL 64 11/23/2021 10:44 AM    HDL 70 11/04/2020 11:07 AM    LDLCALC 132 11/30/2022 11:31 AM    LDLCALC 111 11/23/2021 10:44 AM    LDLCALC 130 11/04/2020 11:07 AM    GLUCOSE 90 11/30/2022 11:31 AM    LABA1C 5.5 11/23/2021 10:44 AM    LABA1C 5.5 11/04/2020 11:07 AM    LABA1C 5.6 02/27/2017 12:19 PM       The 10-year ASCVD risk score (Yana CARLISLE, et al., 2019) is: 3.7%    Values used to calculate the score:      Age: 47 years      Sex: Female      Is Non- : Yes      Diabetic: No      Tobacco smoker: No      Systolic Blood Pressure: 115 mmHg      Is BP treated: Yes      HDL Cholesterol: 62 mg/dL      Total Cholesterol: 222 mg/dL    Immunization History   Administered Date(s) Administered    COVID-19, PFIZER PURPLE top, DILUTE for use, (age 15 y+), 30mcg/0.3mL 05/18/2021, 06/08/2021    Tdap (Boostrix, Adacel) 06/04/2013       Health Maintenance   Topic Date Due    Hepatitis C screen  Never done    COVID-19 Vaccine (3 - Booster for Pfizer series) 08/03/2021    Shingles vaccine (1 of 2) Never done    Flu vaccine (1) Never done    DTaP/Tdap/Td vaccine (2 - Td or Tdap) 06/04/2023    Depression Screen  12/06/2023    Breast cancer screen  11/16/2024    Cervical cancer screen  12/01/2025    Lipids  11/30/2027    Colorectal Cancer Screen  12/07/2031    HIV screen  Completed    Hepatitis A vaccine  Aged Out    Hib vaccine  Aged Out    Meningococcal (ACWY) vaccine  Aged Out    Pneumococcal 0-64 years Vaccine  Aged Out       Assessment & Plan   Well adult exam  - Anticipatory Guidance  Injury Prevention  Lap-shoulder belts, Smoke detectors, Carbon monoxide detectors, Safe storage and handling of firearms; removal if appropriate and  Occupational risk counseling  Substance Abuse  1. Tobacco cessation or never starting to include pharmacotherapy, social support for cessation, and skills training/problem solving. Avoid alcohol/drug use while driving, swimming, boating, using firearms, etc.   Sexual Behavior  1.  STD prevention; abstinence; avoid high-risk behavior; condoms/female barrier with spermicide,  Contraception   Diet and Exercise   Limit fat and cholesterol; maintain caloric balance; emphasized grains, fruits and vegetables. Adequate calcium and vitamin D intake (females); add foods rich in calcium; supplement as needed.  Regular physical activity at least 150 minutes per week to maintain activity   Protection from UV Light  Abuse and Violence: violence prevention at home, school and in social situations  Dental Health: Regular visits to dental health provider    Other depression  - refer to psychology    Allergic rhinitis, unspecified seasonality, unspecified trigger  The following orders have not been finalized:  -     cetirizine (ZYRTEC) 10 MG tablet  HTN, goal below 140/90  The following orders have not been finalized:  -     atenolol (TENORMIN) 100 MG tablet  -     triamterene-hydroCHLOROthiazide (MAXZIDE) 75-50 MG per tablet  Chronic GERD  The following orders have not been finalized:  -     omeprazole (PRILOSEC) 20 MG delayed release capsule           --Jean Gallardo MD

## 2022-12-06 NOTE — PATIENT INSTRUCTIONS
Schedule pap smear          Meet Dr. Aaron Turcios PsyD, [facility]  Owner  Contact Dr. Claybon Libman: [phone] or;  [email or web address]  verified by psychtoday  Meet Dr. Mayda Mcfadden  Meet Dr. Gi Interiano, [address]    Request Appointment  I am committed to helping people find the strength inside themselves to triumph in relationships, overall functioning and living a balanced life. I have worked with young children, adolescents, adults, family and other specialized groups. Clients have presented with issues such as anxiety, depression, communication problems, marital concerns, parenting, ADHD, eating disorders, grief/loss, self-injury and social skills development. I am trained in Cognitive Behavioral Therapy, Motivational Interviewing and Dialectical Behavioral Therapy. I believe in the power of the therapeutic relationship helping drive individuals and families to reach their full potential and live a life void of unresolved issues. I am passionate about helping others and I do so with openness, honesty and compassion. I understand that change is difficult and through positive support and consistent encouragement I assist clients as they take the courageous steps toward impactful change. Constantly striving for balance in my own life, I enjoy spending time with my family, outings with friends and travel. I believe in the importance of relaxation and keeping constant those things that brings ronni and fulfillment. I encourage the same with my clients and will help with the identification process, if necessary. While therapy is traditionally offered in an office setting, I am happy to offer [facility], which helps to facilitate comfort and ease in the therapy process.     Education  [employer] - Doctor of Psychology (Psy.D.)-Clinical Psychology  [employer] [facility] [place] [employer] [place] (M.A. )-Counseling Psychology  [address] (B.A.)-Psychology    Licenses  Licensed Professional Clinical Counselor (LPCC-S)    See Dr. Shar Turcios Psy.D., LPCC-S, if you need help with:          [facility]        [facility] [employer] [facility] [address] [facility]  [address] [facility] [address] [place]gkin  [phone]    [facility]  [address]  [address]  [phone]    [facility] Consultation and [facility]  [address] [facility]  [address], [address] [place] [address]  [address] [facility],[place] [address]   [address] [facility]  [phone]    Dr. Andrew Marcos, Psychologist  [phone]     Dr. Dot Pantoja  [address], [place]    [phone]    Dr. Narayan [address], [address] [facility], [address] [place] [address] [address]  [phone]    Dr. Vera Larkin  [phone]([email or web address])    Chel Amaro MD  [phone]

## 2022-12-20 ENCOUNTER — TELEPHONE (OUTPATIENT)
Dept: PRIMARY CARE CLINIC | Age: 52
End: 2022-12-20

## 2022-12-20 NOTE — TELEPHONE ENCOUNTER
Good Evening, Patient was scheduled for virtual visit tonight, however, after many push notifications and about 4 phone call I was unable to reach patient to connect and appointment canceled. Please follow up with patient.  Thanks, Mauricio Armstrong

## 2023-01-10 ENCOUNTER — OFFICE VISIT (OUTPATIENT)
Dept: INTERNAL MEDICINE CLINIC | Age: 53
End: 2023-01-10
Payer: MEDICAID

## 2023-01-10 VITALS
HEART RATE: 88 BPM | OXYGEN SATURATION: 98 % | WEIGHT: 247 LBS | DIASTOLIC BLOOD PRESSURE: 75 MMHG | SYSTOLIC BLOOD PRESSURE: 118 MMHG | BODY MASS INDEX: 41.1 KG/M2

## 2023-01-10 DIAGNOSIS — J30.89 ENVIRONMENTAL AND SEASONAL ALLERGIES: Primary | ICD-10-CM

## 2023-01-10 PROCEDURE — G8417 CALC BMI ABV UP PARAM F/U: HCPCS | Performed by: NURSE PRACTITIONER

## 2023-01-10 PROCEDURE — G8427 DOCREV CUR MEDS BY ELIG CLIN: HCPCS | Performed by: NURSE PRACTITIONER

## 2023-01-10 PROCEDURE — 3074F SYST BP LT 130 MM HG: CPT | Performed by: NURSE PRACTITIONER

## 2023-01-10 PROCEDURE — 3017F COLORECTAL CA SCREEN DOC REV: CPT | Performed by: NURSE PRACTITIONER

## 2023-01-10 PROCEDURE — 99213 OFFICE O/P EST LOW 20 MIN: CPT | Performed by: NURSE PRACTITIONER

## 2023-01-10 PROCEDURE — 1036F TOBACCO NON-USER: CPT | Performed by: NURSE PRACTITIONER

## 2023-01-10 PROCEDURE — G8484 FLU IMMUNIZE NO ADMIN: HCPCS | Performed by: NURSE PRACTITIONER

## 2023-01-10 PROCEDURE — 3078F DIAST BP <80 MM HG: CPT | Performed by: NURSE PRACTITIONER

## 2023-01-10 ASSESSMENT — PATIENT HEALTH QUESTIONNAIRE - PHQ9
5. POOR APPETITE OR OVEREATING: 0
3. TROUBLE FALLING OR STAYING ASLEEP: 0
SUM OF ALL RESPONSES TO PHQ QUESTIONS 1-9: 0
SUM OF ALL RESPONSES TO PHQ9 QUESTIONS 1 & 2: 0
SUM OF ALL RESPONSES TO PHQ QUESTIONS 1-9: 0
4. FEELING TIRED OR HAVING LITTLE ENERGY: 0
1. LITTLE INTEREST OR PLEASURE IN DOING THINGS: 0
9. THOUGHTS THAT YOU WOULD BE BETTER OFF DEAD, OR OF HURTING YOURSELF: 0
6. FEELING BAD ABOUT YOURSELF - OR THAT YOU ARE A FAILURE OR HAVE LET YOURSELF OR YOUR FAMILY DOWN: 0
7. TROUBLE CONCENTRATING ON THINGS, SUCH AS READING THE NEWSPAPER OR WATCHING TELEVISION: 0
SUM OF ALL RESPONSES TO PHQ QUESTIONS 1-9: 0
SUM OF ALL RESPONSES TO PHQ QUESTIONS 1-9: 0
2. FEELING DOWN, DEPRESSED OR HOPELESS: 0
10. IF YOU CHECKED OFF ANY PROBLEMS, HOW DIFFICULT HAVE THESE PROBLEMS MADE IT FOR YOU TO DO YOUR WORK, TAKE CARE OF THINGS AT HOME, OR GET ALONG WITH OTHER PEOPLE: 0
8. MOVING OR SPEAKING SO SLOWLY THAT OTHER PEOPLE COULD HAVE NOTICED. OR THE OPPOSITE, BEING SO FIGETY OR RESTLESS THAT YOU HAVE BEEN MOVING AROUND A LOT MORE THAN USUAL: 0

## 2023-01-10 ASSESSMENT — ANXIETY QUESTIONNAIRES
2. NOT BEING ABLE TO STOP OR CONTROL WORRYING: 0
6. BECOMING EASILY ANNOYED OR IRRITABLE: 0
IF YOU CHECKED OFF ANY PROBLEMS ON THIS QUESTIONNAIRE, HOW DIFFICULT HAVE THESE PROBLEMS MADE IT FOR YOU TO DO YOUR WORK, TAKE CARE OF THINGS AT HOME, OR GET ALONG WITH OTHER PEOPLE: NOT DIFFICULT AT ALL
GAD7 TOTAL SCORE: 1
7. FEELING AFRAID AS IF SOMETHING AWFUL MIGHT HAPPEN: 0
4. TROUBLE RELAXING: 0
3. WORRYING TOO MUCH ABOUT DIFFERENT THINGS: 1
1. FEELING NERVOUS, ANXIOUS, OR ON EDGE: 0
5. BEING SO RESTLESS THAT IT IS HARD TO SIT STILL: 0

## 2023-01-10 ASSESSMENT — ENCOUNTER SYMPTOMS
GASTROINTESTINAL NEGATIVE: 1
RESPIRATORY NEGATIVE: 1
ALLERGIC/IMMUNOLOGIC NEGATIVE: 1
EYES NEGATIVE: 1
RHINORRHEA: 1

## 2023-01-10 NOTE — PATIENT INSTRUCTIONS
Place a bowl of water in microwave, when boils inhale hot steam for 5 minutes, then gargle with warm salt and water  Take zyrtec at night  Use humidifier at night  Continue to drink plenty of water

## 2023-01-10 NOTE — PROGRESS NOTES
[address] Missy (:  1970) is a 47 y.o. female,Established patient, here for evaluation of the following chief complaint(s):  Epistaxis (Off and on for 2 weeks)         ASSESSMENT/PLAN:    Khadra Courtney was seen today for epistaxis. Diagnoses and all orders for this visit:    Environmental and seasonal allergies     Place a bowl of water in microwave, when boils inhale hot steam for 5 minutes, then gargle with warm salt and water  Take zyrtec at night  Use humidifier at night  Continue to drink plenty of water           Subjective   SUBJECTIVE/OBJECTIVE:  HPIPresents  today for follow up on nasal drainage light red mucus for about 2 weeks. States has congestion afterwards. Review of Systems   Constitutional: Negative. HENT:  Positive for congestion, postnasal drip and rhinorrhea. Eyes: Negative. Respiratory: Negative. Cardiovascular: Negative. Gastrointestinal: Negative. Endocrine: Negative. Genitourinary: Negative. Musculoskeletal: Negative. Skin: Negative. Allergic/Immunologic: Negative. Neurological: Negative. Hematological: Negative. Psychiatric/Behavioral: Negative. Vitals:    01/10/23 1510   BP: 118/75   Pulse: 88   SpO2: 98%      BP Readings from Last 3 Encounters:   01/10/23 118/75   22 130/72   22 130/70      Wt Readings from Last 3 Encounters:   01/10/23 247 lb (112 kg)   22 205 lb (93 kg)   22 244 lb (110.7 kg)      Objective   Physical Exam  Constitutional:       Appearance: Normal appearance. She is obese. HENT:      Head: Normocephalic. Right Ear: Hearing, tympanic membrane, ear canal and external ear normal.      Left Ear: Hearing, tympanic membrane, ear canal and external ear normal.      Nose: Mucosal edema and congestion present. Left Turbinates: Swollen. Comments: Edema of left septum noted     Mouth/Throat:      Mouth: Mucous membranes are moist.      Palate: No mass. Pharynx: Oropharynx is clear. Uvula midline. Cardiovascular:      Rate and Rhythm: Normal rate and regular rhythm. Heart sounds: Normal heart sounds. Pulmonary:      Effort: Pulmonary effort is normal.      Breath sounds: Normal breath sounds and air entry. Musculoskeletal:      Cervical back: Full passive range of motion without pain and normal range of motion. Skin:     General: Skin is warm and dry. Neurological:      Mental Status: She is alert. Psychiatric:         Attention and Perception: Attention and perception normal.                An electronic signature was used to authenticate this note.     --Estela Tristan, SACHIN - CNP

## 2023-02-10 DIAGNOSIS — I10 HTN, GOAL BELOW 140/90: ICD-10-CM

## 2023-02-10 RX ORDER — IRBESARTAN 150 MG/1
TABLET ORAL
Qty: 90 TABLET | Refills: 0 | Status: SHIPPED | OUTPATIENT
Start: 2023-02-10

## 2023-02-10 NOTE — TELEPHONE ENCOUNTER
Recent Visits  Date Type Provider Dept   01/10/23 Office Visit SACHIN Blankenship CNP [facility]   12/06/22 Office Visit Jose Antonio Beckwith MD [facility]   05/04/22 Office Visit Jose Antonio Beckwith MD [facility]   12/01/21 Office Visit Jose Antonio Beckwith MD [facility]   09/01/21 Office Visit Jose Antonio Beckwith MD [facility]   Showing recent visits within past 540 days with a meds authorizing provider and meeting all other requirements  Future Appointments  No visits were found meeting these conditions. Showing future appointments within next 150 days with a meds authorizing provider and meeting all other requirements     1/10/2023      Name from pharmacy: IRBESARTAN 150 MG TABLET         Will file in chart as: irbesartan (AVAPRO) 150 MG tablet    The original prescription was discontinued on 12/6/2022 by Sahara Rodriguez MA for the following reason: Therapy completed. Renewing this prescription may not be appropriate.

## 2023-02-19 DIAGNOSIS — I10 HTN, GOAL BELOW 140/90: ICD-10-CM

## 2023-02-20 RX ORDER — TRIAMTERENE AND HYDROCHLOROTHIAZIDE 75; 50 MG/1; MG/1
TABLET ORAL
Qty: 45 TABLET | Refills: 0 | Status: SHIPPED | OUTPATIENT
Start: 2023-02-20

## 2023-02-20 NOTE — TELEPHONE ENCOUNTER
Recent Visits  Date Type Provider Dept   01/10/23 Office Visit SACHIN Vicente CNP [facility]   12/06/22 Office Visit Ricardo Ortiz MD [facility]   05/04/22 Office Visit Ricardo Ortiz MD [facility]   12/01/21 Office Visit Ricardo Ortiz MD [facility]   09/01/21 Office Visit Ricardo Ortiz MD [facility]   Showing recent visits within past 540 days with a meds authorizing provider and meeting all other requirements  Future Appointments  No visits were found meeting these conditions.   Showing future appointments within next 150 days with a meds authorizing provider and meeting all other requirements     1/10/2023

## 2023-03-17 ENCOUNTER — APPOINTMENT (OUTPATIENT)
Dept: GENERAL RADIOLOGY | Age: 53
End: 2023-03-17
Payer: MEDICAID

## 2023-03-17 ENCOUNTER — HOSPITAL ENCOUNTER (EMERGENCY)
Age: 53
Discharge: HOME | End: 2023-03-17
Payer: MEDICAID

## 2023-03-17 VITALS
HEIGHT: 65 IN | HEART RATE: 68 BPM | WEIGHT: 200 LBS | SYSTOLIC BLOOD PRESSURE: 139 MMHG | RESPIRATION RATE: 16 BRPM | DIASTOLIC BLOOD PRESSURE: 87 MMHG | BODY MASS INDEX: 33.32 KG/M2 | TEMPERATURE: 98 F | OXYGEN SATURATION: 99 %

## 2023-03-17 DIAGNOSIS — R07.9 CHEST PAIN, UNSPECIFIED TYPE: ICD-10-CM

## 2023-03-17 DIAGNOSIS — M25.512 ACUTE PAIN OF LEFT SHOULDER: Primary | ICD-10-CM

## 2023-03-17 LAB
ALBUMIN SERPL-MCNC: 4.2 G/DL (ref 3.4–5)
ALBUMIN/GLOB SERPL: 1 {RATIO} (ref 1.1–2.2)
ALP SERPL-CCNC: 101 U/L (ref 40–129)
ALT SERPL-CCNC: 20 U/L (ref 10–40)
ANION GAP SERPL CALCULATED.3IONS-SCNC: 9 MMOL/L (ref 3–16)
AST SERPL-CCNC: 25 U/L (ref 15–37)
BASOPHILS # BLD: 0.1 K/UL (ref 0–0.2)
BASOPHILS NFR BLD: 0.9 %
BILIRUB SERPL-MCNC: 0.4 MG/DL (ref 0–1)
BUN SERPL-MCNC: 12 MG/DL (ref 7–20)
CALCIUM SERPL-MCNC: 10.9 MG/DL (ref 8.3–10.6)
CHLORIDE SERPL-SCNC: 101 MMOL/L (ref 99–110)
CO2 SERPL-SCNC: 27 MMOL/L (ref 21–32)
CREAT SERPL-MCNC: 0.9 MG/DL (ref 0.6–1.1)
DEPRECATED RDW RBC AUTO: 14.7 % (ref 12.4–15.4)
EOSINOPHIL # BLD: 0.1 K/UL (ref 0–0.6)
EOSINOPHIL NFR BLD: 1.2 %
GFR SERPLBLD CREATININE-BSD FMLA CKD-EPI: >60 ML/MIN/{1.73_M2}
GLUCOSE SERPL-MCNC: 90 MG/DL (ref 70–99)
HCG SERPL QL: NEGATIVE
HCT VFR BLD AUTO: 33.5 % (ref 36–48)
HGB BLD-MCNC: 11 G/DL (ref 12–16)
LYMPHOCYTES # BLD: 2.2 K/UL (ref 1–5.1)
LYMPHOCYTES NFR BLD: 31.1 %
MCH RBC QN AUTO: 27.4 PG (ref 26–34)
MCHC RBC AUTO-ENTMCNC: 32.9 G/DL (ref 31–36)
MCV RBC AUTO: 83.3 FL (ref 80–100)
MONOCYTES # BLD: 0.5 K/UL (ref 0–1.3)
MONOCYTES NFR BLD: 7.8 %
NEUTROPHILS # BLD: 4.1 K/UL (ref 1.7–7.7)
NEUTROPHILS NFR BLD: 59 %
NT-PROBNP SERPL-MCNC: 49 PG/ML (ref 0–124)
PLATELET # BLD AUTO: 399 K/UL (ref 135–450)
PMV BLD AUTO: 8.1 FL (ref 5–10.5)
POTASSIUM SERPL-SCNC: 3.9 MMOL/L (ref 3.5–5.1)
PROT SERPL-MCNC: 8.5 G/DL (ref 6.4–8.2)
RBC # BLD AUTO: 4.01 M/UL (ref 4–5.2)
SODIUM SERPL-SCNC: 137 MMOL/L (ref 136–145)
TROPONIN T SERPL-MCNC: <0.01 NG/ML
WBC # BLD AUTO: 6.9 K/UL (ref 4–11)

## 2023-03-17 PROCEDURE — 93005 ELECTROCARDIOGRAM TRACING: CPT | Performed by: EMERGENCY MEDICINE

## 2023-03-17 PROCEDURE — 83880 ASSAY OF NATRIURETIC PEPTIDE: CPT

## 2023-03-17 PROCEDURE — 80053 COMPREHEN METABOLIC PANEL: CPT

## 2023-03-17 PROCEDURE — 71045 X-RAY EXAM CHEST 1 VIEW: CPT

## 2023-03-17 PROCEDURE — 84703 CHORIONIC GONADOTROPIN ASSAY: CPT

## 2023-03-17 PROCEDURE — 96375 TX/PRO/DX INJ NEW DRUG ADDON: CPT

## 2023-03-17 PROCEDURE — 36415 COLL VENOUS BLD VENIPUNCTURE: CPT

## 2023-03-17 PROCEDURE — 6360000002 HC RX W HCPCS: Performed by: PHYSICIAN ASSISTANT

## 2023-03-17 PROCEDURE — 85025 COMPLETE CBC W/AUTO DIFF WBC: CPT

## 2023-03-17 PROCEDURE — 99285 EMERGENCY DEPT VISIT HI MDM: CPT

## 2023-03-17 PROCEDURE — 96374 THER/PROPH/DIAG INJ IV PUSH: CPT

## 2023-03-17 PROCEDURE — 84484 ASSAY OF TROPONIN QUANT: CPT

## 2023-03-17 RX ORDER — KETOROLAC TROMETHAMINE 15 MG/ML
30 INJECTION, SOLUTION INTRAMUSCULAR; INTRAVENOUS ONCE
Status: COMPLETED | OUTPATIENT
Start: 2023-03-17 | End: 2023-03-17

## 2023-03-17 RX ORDER — NAPROXEN 500 MG/1
500 TABLET ORAL 2 TIMES DAILY WITH MEALS
Qty: 60 TABLET | Refills: 0 | Status: SHIPPED | OUTPATIENT
Start: 2023-03-17

## 2023-03-17 RX ORDER — ORPHENADRINE CITRATE 30 MG/ML
60 INJECTION INTRAMUSCULAR; INTRAVENOUS ONCE
Status: COMPLETED | OUTPATIENT
Start: 2023-03-17 | End: 2023-03-17

## 2023-03-17 RX ORDER — CYCLOBENZAPRINE HCL 10 MG
10 TABLET ORAL 3 TIMES DAILY PRN
Qty: 20 TABLET | Refills: 0 | Status: SHIPPED | OUTPATIENT
Start: 2023-03-17 | End: 2023-03-27

## 2023-03-17 RX ADMIN — ORPHENADRINE CITRATE 60 MG: 30 INJECTION INTRAMUSCULAR; INTRAVENOUS at 19:40

## 2023-03-17 RX ADMIN — KETOROLAC TROMETHAMINE 30 MG: 15 INJECTION, SOLUTION INTRAMUSCULAR; INTRAVENOUS at 19:40

## 2023-03-17 ASSESSMENT — ENCOUNTER SYMPTOMS
DIARRHEA: 0
COUGH: 0
SHORTNESS OF BREATH: 0
VOMITING: 0
CHEST TIGHTNESS: 0
NAUSEA: 0
ABDOMINAL PAIN: 0

## 2023-03-17 ASSESSMENT — HEART SCORE: ECG: 0

## 2023-03-17 ASSESSMENT — PAIN DESCRIPTION - LOCATION: LOCATION: CHEST

## 2023-03-17 ASSESSMENT — PAIN SCALES - GENERAL: PAINLEVEL_OUTOF10: 7

## 2023-03-17 NOTE — ED PROVIDER NOTES
[facility] [facility]        Pt Name: Garfield Handy  MRN: [id number]  Bhargavgfranjan 1970  Date of evaluation: 3/17/2023  Provider: Lloyd Alcocer PA-C  PCP: Kenny Hawthorne MD  Note Started: 7:25 PM EDT 3/17/23      ALEIDA. I have evaluated this patient. My supervising physician was available for consultation. CHIEF COMPLAINT       Chief Complaint   Patient presents with    Shoulder Pain     Patient with complaints of pain to the chest radiating to left shoulder. Started today        HISTORY OF PRESENT ILLNESS: 1 or more Elements     History from : Patient    Limitations to history : None    Gerardo Adler is a 47 y.o. female with a history of hypertension, hyperlipidemia and obesity who presents to the emergency department today stating she woke up at 8 AM this morning with chest pain. She states the pain has now moved to her back around her shoulder blade. She describes her pain as an achy, constant, 7/10 pain. Pain is worse with taking a big breath, movement of her left shoulder, or any type of lifting, pushing and pulling. 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 She denies shortness of breath, palpitations, diaphoresis, lightheadedness or dizziness. She denies fevers, chills, coughing or recent illness. She denies recent travel, unilateral leg swelling or history of coagulopathy. She denies any family cardiac history. She does not smoke or drink. Nursing Notes were all reviewed and agreed with or any disagreements were addressed in the HPI. REVIEW OF SYSTEMS :      Review of Systems   Constitutional:  Negative for chills and fever. Respiratory:  Negative for cough, chest tightness and shortness of breath. Cardiovascular:  Positive for chest pain. Negative for palpitations and leg swelling.    Gastrointestinal:  Negative for abdominal pain, diarrhea, nausea and vomiting. Genitourinary:  Negative for difficulty urinating, dysuria, flank pain, frequency and hematuria. Musculoskeletal:  Positive for arthralgias. Neurological:  Negative for dizziness, light-headedness, numbness and headaches. All other systems reviewed and are negative. Positives and Pertinent negatives as per HPI. SURGICAL HISTORY     Past Surgical History:   Procedure Laterality Date    COLONOSCOPY N/A 12/7/2021    COLONOSCOPY DIAGNOSTIC performed by Cristobal Gay MD at [facility]       Discharge Medication List as of 3/17/2023  8:58 PM        CONTINUE these medications which have NOT CHANGED    Details   triamterene-hydroCHLOROthiazide (MAXZIDE) 75-50 MG per tablet TAKE 1/2 TABLET BY MOUTH ONCE DAILY, Disp-45 tablet, R-0Normal      irbesartan (AVAPRO) 150 MG tablet TAKE 1 TABLET BY MOUTH EVERY DAY, Disp-90 tablet, R-0Normal      cetirizine (ZYRTEC) 10 MG tablet Take 1 tablet by mouth once daily, Disp-90 tablet, R-1, DAWNormal      omeprazole (PRILOSEC) 20 MG delayed release capsule Take 1 capsule by mouth daily For heartburn, Disp-90 capsule, R-1Normal      atenolol (TENORMIN) 100 MG tablet Take 1 tablet by mouth daily, Disp-90 tablet, R-1Normal      Ascorbic Acid (VITAMIN C) 1000 MG tablet Take 1,000 mg by mouth dailyHistorical Med      Multiple Vitamins-Minerals (THERAPEUTIC MULTIVITAMIN-MINERALS) tablet Take 1 tablet by mouth dailyHistorical Med      aspirin 81 MG tablet Take 81 mg by mouth dailyHistorical Med      Cholecalciferol (VITAMIN D3) 5000 units TABS Take by mouthHistorical Med             ALLERGIES     Patient has no known allergies.     FAMILYHISTORY       Family History   Problem Relation Age of Onset    High Blood Pressure Mother     High Cholesterol Mother     Cancer Mother         uterine    High Blood Pressure Father     High Cholesterol Father     Other Maternal Grandfather         kidney failure    Hypertension Sister         SOCIAL HISTORY       Social History     Tobacco Use    Smoking status: Never    Smokeless tobacco: Never   Vaping Use    Vaping Use: Never used   Substance Use Topics    Alcohol use: No    Drug use: No       SCREENINGS                 Heart Score for chest pain patients  History: Slightly Suspicious  ECG: Normal  Patient Age: > 39 and < 65 years  *Risk factors for Atherosclerotic disease: Hypercholesterolemia, Hypertension, Obesity  Risk Factors: > 3 Risk factors or history of atherosclerotic disease*  Troponin: < 1X normal limit  Heart Score Total: 3       CIWA Assessment  BP: 139/87  Heart Rate: 68           PHYSICAL EXAM  1 or more Elements     ED Triage Vitals   BP Temp Temp src Heart Rate Resp SpO2 Height Weight   03/17/23 1900 03/17/23 1900 -- 03/17/23 1900 03/17/23 1900 03/17/23 1900 03/17/23 1910 03/17/23 1910   139/87 98 °F (36.7 °C)  68 16 99 % 5' 5\" (1.651 m) 200 lb (90.7 kg)       Physical Exam  Vitals and nursing note reviewed. Constitutional:       Appearance: She is well-developed. She is not diaphoretic. HENT:      Head: Normocephalic and atraumatic. Eyes:      General:         Right eye: No discharge. Left eye: No discharge. Cardiovascular:      Rate and Rhythm: Normal rate and regular rhythm. Pulses: Normal pulses. Heart sounds: Normal heart sounds. Pulmonary:      Effort: Pulmonary effort is normal. No respiratory distress. Breath sounds: Normal breath sounds. Abdominal:      General: Abdomen is flat. Bowel sounds are normal. There is no distension. Palpations: Abdomen is soft. Tenderness: There is no abdominal tenderness. There is no guarding. Musculoskeletal:         General: Normal range of motion. Left shoulder: Tenderness present. Cervical back: Normal range of motion and neck supple. Comments: Patient has reproducible pain on palpation over the trapezius muscle and even some over the rhomboid muscle.   On exam of this area there is no swelling. There is no joint effusion of the left shoulder. Skin:     General: Skin is warm and dry. Coloration: Skin is not pale. Neurological:      Mental Status: She is alert and oriented to person, place, and time. Psychiatric:         Behavior: Behavior normal.           DIAGNOSTIC RESULTS   LABS:    Labs Reviewed   CBC WITH AUTO DIFFERENTIAL - Abnormal; Notable for the following components:       Result Value    Hemoglobin 11.0 (*)     Hematocrit 33.5 (*)     All other components within normal limits   COMPREHENSIVE METABOLIC PANEL - Abnormal; Notable for the following components:    Calcium 10.9 (*)     Total Protein 8.5 (*)     Albumin/Globulin Ratio 1.0 (*)     All other components within normal limits   HCG, SERUM, QUALITATIVE   TROPONIN   BRAIN NATRIURETIC PEPTIDE   URINALYSIS WITH REFLEX TO CULTURE       When ordered only abnormal lab results are displayed. All other labs were within normal range or not returned as of this dictation. EKG: When ordered, EKG's are interpreted by the Emergency Department Physician in the absence of a cardiologist.  Please see their note for interpretation of EKG. RADIOLOGY:   Non-plain film images such as CT, Ultrasound and MRI are read by the radiologist. Plain radiographic images are visualized and preliminarily interpreted by the ED Provider with the below findings:        Interpretation per the Radiologist below, if available at the time of this note:    XR CHEST PORTABLE   Final Result   No evidence of acute cardiopulmonary disease.                PROCEDURES   Unless otherwise noted below, none     Procedures    CRITICAL CARE TIME (.cctime)       PAST MEDICAL HISTORY      has a past medical history of Heart murmur, Hypertension, Morbidly obese ([address] Nephrolithiasis, and Postmenopausal.     Chronic Conditions affecting Care: None    EMERGENCY DEPARTMENT COURSE and DIFFERENTIAL DIAGNOSIS/MDM:   Vitals:    Vitals:    03/17/23 1900 03/17/23 1910   BP: 139/87    Pulse: 68    Resp: 16    Temp: 98 °F (36.7 °C)    SpO2: 99%    Weight:  200 lb (90.7 kg)   Height:  5' 5\" (1.651 m)       Patient was given the following medications:  Medications   ketorolac (TORADOL) injection 30 mg (30 mg IntraVENous Given 3/17/23 1940)   orphenadrine (NORFLEX) injection 60 mg (60 mg IntraVENous Given 3/17/23 1940)             Is this patient to be included in the SEP-1 Core Measure due to severe sepsis or septic shock? No   Exclusion criteria - the patient is NOT to be included for SEP-1 Core Measure due to: Infection is not suspected    CONSULTS: (Who and What was discussed)  None  Discussion with Other Profesionals : None    Social Determinants : None    Records Reviewed : None    CC/HPI Summary, DDx, ED Course, and Reassessment: Patient presented to the emergency department today dating she woke up at 8 AM this morning with chest pain that soon radiated to her back around her shoulder blade. She states pain is worse with movement of the left shoulder or with any type of lifting, pushing or pulling. She states pain is worse with taking a big breath. She is no longer having chest pain at this time. She denies ever having shortness of breath, palpitations, diaphoresis, lightheadedness or dizziness. She denies fevers, chills, recent illness or coughing. She has had no recent travel, unilateral leg swelling or history of coagulopathy. She is not on birth control. Her EKG shows no signs of acute ischemia or infarction. Troponin <0.01.  proBNP is only 49 and chest x-ray shows no acute cardiopulmonary disease. CBC, BMP and LFTs are unremarkable. Qualitative hCG is negative. Patient is given Toradol and Norflex here in the emergency department. Disposition Considerations (include 1 Tests not done, Shared Decision Making, Pt Expectation of Test or Tx.): On reexamination, patient states she is feeling better.   I have suspicion that this could be musculoskeletal in nature. 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 She does a lot of lifting, pushing and pulling. Her pain is completely reproducible and improved with NSAIDs and muscle relaxers. Patient has a heart score of 3. She will be discharged with Naprosyn and Flexeril. She is given strict return precautions. HEART Score ? 3 and 1 negative troponin - No hospitalization indicated    I completed a HEART Score to screen for Major Adverse Cardiac Event (MACE) in this patient. The evidence indicates that the patient is low risk for MACE and this is consistent with my clinical intuition. The risk of further workup or hospitalization for MACE is likely higher than the risk of the patient having a MACE. It is, therefore, in the patients best interest not to do additional emergent testing or to be hospitalized for MACE. I have discussed with the patient my clinical impression and the result of the HEART Score to screen for MACE, as well as the risks of further testing and hospitalization. The HEART Score shows that the risk for MACE is less than 2%      The patient has been evaluated and the history and physical exam suggest a benign etiology. I see nothing to suggest acute coronary syndrome, myocardial infarction, pulmonary embolism, thoracic aortic dissection, significant pericarditis, pneumonia, pneumothorax, or acute abdomen. I feel the patient can be safely discharged to home with outpatient follow up. Instructions have been given for the patient to return to the Emergency Department for any worsening of the symptoms, including but not limited to increased pain, shortness of breath, abdominal pain or weakness. Appropriate for outpatient management        I am the Primary Clinician of Record. FINAL IMPRESSION      1. Acute pain of left shoulder    2.  Chest pain, unspecified type          DISPOSITION/PLAN     DISPOSITION Decision To Discharge 03/17/2023 08:57:30 PM      PATIENT REFERRED TO:  Maximo FisherGeorgedavidlisa Russo  [place] [address] [facility]  [phone]    Schedule an appointment as soon as possible for a visit       DISCHARGE MEDICATIONS:  Discharge Medication List as of 3/17/2023  8:58 PM        START taking these medications    Details   naproxen (NAPROSYN) 500 MG tablet Take 1 tablet by mouth 2 times daily (with meals), Disp-60 tablet, R-0Normal      cyclobenzaprine (FLEXERIL) 10 MG tablet Take 1 tablet by mouth 3 times daily as needed for Muscle spasms, Disp-20 tablet, R-0, DAWNormal             DISCONTINUED MEDICATIONS:  Discharge Medication List as of 3/17/2023  8:58 PM                 (Please note that portions of this note were completed with a voice recognition program.  Efforts were made to edit the dictations but occasionally words are mis-transcribed.)    Maida Cardoso PA-C (electronically signed)           Maida Cardoso PA-C  03/17/23 [id number]

## 2023-03-18 LAB
EKG ATRIAL RATE: 66 BPM
EKG DIAGNOSIS: NORMAL
EKG P AXIS: 58 DEGREES
EKG P-R INTERVAL: 152 MS
EKG Q-T INTERVAL: 376 MS
EKG QRS DURATION: 78 MS
EKG QTC CALCULATION (BAZETT): 394 MS
EKG R AXIS: 40 DEGREES
EKG T AXIS: 48 DEGREES
EKG VENTRICULAR RATE: 66 BPM

## 2023-03-18 PROCEDURE — 93010 ELECTROCARDIOGRAM REPORT: CPT | Performed by: INTERNAL MEDICINE

## 2023-03-20 ENCOUNTER — TELEPHONE (OUTPATIENT)
Dept: INTERNAL MEDICINE CLINIC | Age: 53
End: 2023-03-20

## 2023-03-20 NOTE — TELEPHONE ENCOUNTER
----- Message from Bello Lambert sent at 3/20/2023 12:57 PM EDT -----  Subject: Appointment Request    Reason for Call: Established Patient Appointment needed: Routine ED Follow   Up Visit    QUESTIONS    Reason for appointment request? No appointments available during search     Additional Information for Provider? Pt would like to schedule a n ED f/u   with her PCP Only; Pt was seen at the ER on 3/17 for chest pain; Offered   an appt w/ NP and Pt declined.  Please call pt to schedule   ---------------------------------------------------------------------------  --------------  [facility] INFO  [phone]; OK to leave message on voicemail  ---------------------------------------------------------------------------  --------------  SCRIPT ANSWERS  COVID Screen: Juni Jimenez

## 2023-08-23 DIAGNOSIS — I10 HTN, GOAL BELOW 140/90: ICD-10-CM

## 2023-08-23 RX ORDER — ATENOLOL 100 MG/1
TABLET ORAL
Qty: 90 TABLET | Refills: 0 | Status: SHIPPED | OUTPATIENT
Start: 2023-08-23

## 2023-09-08 DIAGNOSIS — I10 HTN, GOAL BELOW 140/90: ICD-10-CM

## 2023-09-11 RX ORDER — IRBESARTAN 150 MG/1
TABLET ORAL
Qty: 90 TABLET | Refills: 0 | Status: SHIPPED | OUTPATIENT
Start: 2023-09-11

## 2023-09-11 NOTE — TELEPHONE ENCOUNTER
Recent Visits  Date Type Provider Dept   04/12/23 Office Visit Oneida Moreno MD [facility]   01/10/23 Office Visit Felicia Lyles APRN - CNP [facility]   12/06/22 Office Visit Oneida Moreno MD [facility]   05/04/22 Office Visit Oneida Moreno MD [facility]   Showing recent visits within past 540 days with a meds authorizing provider and meeting all other requirements  Future Appointments  Date Type Provider Dept   10/12/23 Appointment Oneida Moreno MD [facility]   12/13/23 Appointment Oneida Moreno MD [facility]   Showing future appointments within next 150 days with a meds authorizing provider and meeting all other requirements     4/12/2023

## 2023-10-12 ENCOUNTER — OFFICE VISIT (OUTPATIENT)
Dept: INTERNAL MEDICINE CLINIC | Age: 53
End: 2023-10-12
Payer: MEDICAID

## 2023-10-12 VITALS
BODY MASS INDEX: 39.44 KG/M2 | OXYGEN SATURATION: 98 % | SYSTOLIC BLOOD PRESSURE: 126 MMHG | HEART RATE: 69 BPM | DIASTOLIC BLOOD PRESSURE: 72 MMHG | WEIGHT: 237 LBS

## 2023-10-12 DIAGNOSIS — I10 HTN, GOAL BELOW 140/90: ICD-10-CM

## 2023-10-12 DIAGNOSIS — J30.9 ALLERGIC RHINITIS, UNSPECIFIED SEASONALITY, UNSPECIFIED TRIGGER: ICD-10-CM

## 2023-10-12 DIAGNOSIS — K21.9 CHRONIC GERD: ICD-10-CM

## 2023-10-12 DIAGNOSIS — Z13.9 ENCOUNTER FOR HEALTH-RELATED SCREENING: ICD-10-CM

## 2023-10-12 LAB
ALBUMIN SERPL-MCNC: 4.2 G/DL (ref 3.4–5)
ALBUMIN/GLOB SERPL: 1.2 {RATIO} (ref 1.1–2.2)
ALP SERPL-CCNC: 100 U/L (ref 40–129)
ALT SERPL-CCNC: 11 U/L (ref 10–40)
ANION GAP SERPL CALCULATED.3IONS-SCNC: 12 MMOL/L (ref 3–16)
AST SERPL-CCNC: 16 U/L (ref 15–37)
BILIRUB SERPL-MCNC: <0.2 MG/DL (ref 0–1)
BUN SERPL-MCNC: 17 MG/DL (ref 7–20)
CALCIUM SERPL-MCNC: 9.5 MG/DL (ref 8.3–10.6)
CHLORIDE SERPL-SCNC: 103 MMOL/L (ref 99–110)
CHOLEST SERPL-MCNC: 203 MG/DL (ref 0–199)
CO2 SERPL-SCNC: 24 MMOL/L (ref 21–32)
CREAT SERPL-MCNC: 0.9 MG/DL (ref 0.6–1.1)
GFR SERPLBLD CREATININE-BSD FMLA CKD-EPI: >60 ML/MIN/{1.73_M2}
GLUCOSE SERPL-MCNC: 94 MG/DL (ref 70–99)
HDLC SERPL-MCNC: 57 MG/DL (ref 40–60)
LDLC SERPL CALC-MCNC: 131 MG/DL
POTASSIUM SERPL-SCNC: 4 MMOL/L (ref 3.5–5.1)
PROT SERPL-MCNC: 7.8 G/DL (ref 6.4–8.2)
SODIUM SERPL-SCNC: 139 MMOL/L (ref 136–145)
TRIGL SERPL-MCNC: 77 MG/DL (ref 0–150)
TSH SERPL DL<=0.005 MIU/L-ACNC: 0.87 UIU/ML (ref 0.27–4.2)
VLDLC SERPL CALC-MCNC: 15 MG/DL

## 2023-10-12 PROCEDURE — G8484 FLU IMMUNIZE NO ADMIN: HCPCS | Performed by: INTERNAL MEDICINE

## 2023-10-12 PROCEDURE — G8417 CALC BMI ABV UP PARAM F/U: HCPCS | Performed by: INTERNAL MEDICINE

## 2023-10-12 PROCEDURE — G8427 DOCREV CUR MEDS BY ELIG CLIN: HCPCS | Performed by: INTERNAL MEDICINE

## 2023-10-12 PROCEDURE — 3017F COLORECTAL CA SCREEN DOC REV: CPT | Performed by: INTERNAL MEDICINE

## 2023-10-12 PROCEDURE — 3074F SYST BP LT 130 MM HG: CPT | Performed by: INTERNAL MEDICINE

## 2023-10-12 PROCEDURE — 3078F DIAST BP <80 MM HG: CPT | Performed by: INTERNAL MEDICINE

## 2023-10-12 PROCEDURE — 1036F TOBACCO NON-USER: CPT | Performed by: INTERNAL MEDICINE

## 2023-10-12 PROCEDURE — 99214 OFFICE O/P EST MOD 30 MIN: CPT | Performed by: INTERNAL MEDICINE

## 2023-10-12 RX ORDER — IRBESARTAN 150 MG/1
150 TABLET ORAL DAILY
Qty: 90 TABLET | Refills: 0 | Status: SHIPPED | OUTPATIENT
Start: 2023-10-12

## 2023-10-12 RX ORDER — TRIAMTERENE AND HYDROCHLOROTHIAZIDE 75; 50 MG/1; MG/1
0.5 TABLET ORAL DAILY
Qty: 45 TABLET | Refills: 1 | Status: SHIPPED | OUTPATIENT
Start: 2023-10-12 | End: 2025-01-31

## 2023-10-12 RX ORDER — CETIRIZINE HYDROCHLORIDE 10 MG/1
TABLET ORAL
Qty: 90 TABLET | Refills: 1 | Status: SHIPPED | OUTPATIENT
Start: 2023-10-12

## 2023-10-12 RX ORDER — OMEPRAZOLE 20 MG/1
20 CAPSULE, DELAYED RELEASE ORAL DAILY
Qty: 90 CAPSULE | Refills: 1 | Status: SHIPPED | OUTPATIENT
Start: 2023-10-12

## 2023-10-12 RX ORDER — ATENOLOL 100 MG/1
100 TABLET ORAL DAILY
Qty: 90 TABLET | Refills: 1 | Status: SHIPPED | OUTPATIENT
Start: 2023-10-12

## 2023-10-12 ASSESSMENT — PATIENT HEALTH QUESTIONNAIRE - PHQ9
SUM OF ALL RESPONSES TO PHQ QUESTIONS 1-9: 0
SUM OF ALL RESPONSES TO PHQ QUESTIONS 1-9: 0
5. POOR APPETITE OR OVEREATING: 0
2. FEELING DOWN, DEPRESSED OR HOPELESS: 0
SUM OF ALL RESPONSES TO PHQ QUESTIONS 1-9: 0
3. TROUBLE FALLING OR STAYING ASLEEP: 0
4. FEELING TIRED OR HAVING LITTLE ENERGY: 0
1. LITTLE INTEREST OR PLEASURE IN DOING THINGS: 0
SUM OF ALL RESPONSES TO PHQ9 QUESTIONS 1 & 2: 0
SUM OF ALL RESPONSES TO PHQ QUESTIONS 1-9: 0
7. TROUBLE CONCENTRATING ON THINGS, SUCH AS READING THE NEWSPAPER OR WATCHING TELEVISION: 0
6. FEELING BAD ABOUT YOURSELF - OR THAT YOU ARE A FAILURE OR HAVE LET YOURSELF OR YOUR FAMILY DOWN: 0
9. THOUGHTS THAT YOU WOULD BE BETTER OFF DEAD, OR OF HURTING YOURSELF: 0
10. IF YOU CHECKED OFF ANY PROBLEMS, HOW DIFFICULT HAVE THESE PROBLEMS MADE IT FOR YOU TO DO YOUR WORK, TAKE CARE OF THINGS AT HOME, OR GET ALONG WITH OTHER PEOPLE: 0
8. MOVING OR SPEAKING SO SLOWLY THAT OTHER PEOPLE COULD HAVE NOTICED. OR THE OPPOSITE, BEING SO FIGETY OR RESTLESS THAT YOU HAVE BEEN MOVING AROUND A LOT MORE THAN USUAL: 0

## 2023-10-12 ASSESSMENT — ANXIETY QUESTIONNAIRES
IF YOU CHECKED OFF ANY PROBLEMS ON THIS QUESTIONNAIRE, HOW DIFFICULT HAVE THESE PROBLEMS MADE IT FOR YOU TO DO YOUR WORK, TAKE CARE OF THINGS AT HOME, OR GET ALONG WITH OTHER PEOPLE: NOT DIFFICULT AT ALL
7. FEELING AFRAID AS IF SOMETHING AWFUL MIGHT HAPPEN: 0
1. FEELING NERVOUS, ANXIOUS, OR ON EDGE: 0
3. WORRYING TOO MUCH ABOUT DIFFERENT THINGS: 0
2. NOT BEING ABLE TO STOP OR CONTROL WORRYING: 0
GAD7 TOTAL SCORE: 0
6. BECOMING EASILY ANNOYED OR IRRITABLE: 0
5. BEING SO RESTLESS THAT IT IS HARD TO SIT STILL: 0
4. TROUBLE RELAXING: 0

## 2023-10-12 ASSESSMENT — ENCOUNTER SYMPTOMS
BLURRED VISION: 0
SHORTNESS OF BREATH: 0
ORTHOPNEA: 0

## 2023-10-12 NOTE — PROGRESS NOTES
[facility]  1970) is a 47 y.o. female,Established patient, here for evaluation of the following chief complaint(s):  Hypertension         ASSESSMENT/PLAN:  1. HTN, goal below 140/90  -     triamterene-hydroCHLOROthiazide (MAXZIDE) 75-50 MG per tablet; Take 0.5 tablets by mouth daily TAKE 1/2 TABLET BY MOUTH ONCE DAILY, Disp-45 tablet, R-1Normal  -     atenolol (TENORMIN) 100 MG tablet; Take 1 tablet by mouth daily, Disp-90 tablet, R-1Normal  -     irbesartan (AVAPRO) 150 MG tablet; Take 1 tablet by mouth daily, Disp-90 tablet, R-0Normal  2. Allergic rhinitis, unspecified seasonality, unspecified trigger  -     cetirizine (ZYRTEC) 10 MG tablet; Take 1 tablet by mouth once daily, Disp-90 tablet, R-1, DAWNormal  3. Chronic GERD  -     omeprazole (PRILOSEC) 20 MG delayed release capsule; Take 1 capsule by mouth daily For heartburn, Disp-90 capsule, R-1Normal      Return in about 6 months (around 2024) for [facility]. Subjective   SUBJECTIVE/OBJECTIVE:  Hypertension  This is a chronic problem. The current episode started more than 1 year ago. The problem is unchanged. The problem is controlled. Pertinent negatives include no anxiety, blurred vision, chest pain, headaches, malaise/fatigue, neck pain, orthopnea, palpitations, peripheral edema, PND, shortness of breath or sweats. Risk factors for coronary artery disease include obesity, sedentary lifestyle and post-menopausal state. Past treatments include angiotensin blockers and diuretics. The current treatment provides moderate improvement. As an aside she had a patter briefly and it went away. Review of Systems   Constitutional: Negative. Negative for malaise/fatigue. Eyes:  Negative for blurred vision. Respiratory:  Negative for shortness of breath. Cardiovascular:  Negative for chest pain, palpitations, orthopnea and PND. Musculoskeletal:  Negative for neck pain. Neurological:  Negative for headaches.    All other

## 2023-10-13 LAB
EST. AVERAGE GLUCOSE BLD GHB EST-MCNC: 114 MG/DL
HBA1C MFR BLD: 5.6 %

## 2023-12-11 DIAGNOSIS — I10 HTN, GOAL BELOW 140/90: ICD-10-CM

## 2023-12-12 RX ORDER — IRBESARTAN 150 MG/1
150 TABLET ORAL DAILY
Qty: 90 TABLET | Refills: 0 | Status: SHIPPED | OUTPATIENT
Start: 2023-12-12

## 2023-12-12 NOTE — TELEPHONE ENCOUNTER
Recent Visits  Date Type Provider Dept   10/12/23 Office Visit Suni Correa MD [facility]   04/12/23 Office Visit Suni Correa MD [facility]   01/10/23 Office Visit SACHIN Ngo - CNP [facility]   12/06/22 Office Visit Suni Correa MD [facility]   Showing recent visits within past 540 days with a meds authorizing provider and meeting all other requirements  Future Appointments  No visits were found meeting these conditions.   Showing future appointments within next 150 days with a meds authorizing provider and meeting all other requirements     10/12/2023

## 2024-02-09 DIAGNOSIS — I10 HTN, GOAL BELOW 140/90: ICD-10-CM

## 2024-02-12 NOTE — TELEPHONE ENCOUNTER
Recent Visits  Date Type Provider Dept   10/12/23 Office Visit Emma Isbell MD [facility]   04/12/23 Office Visit Emma Isbell MD [facility]   01/10/23 Office Visit Chip Chu, APRN - CNP [facility]   12/06/22 Office Visit Emma Isbell MD [facility]   Showing recent visits within past 540 days with a meds authorizing provider and meeting all other requirements  Future Appointments  Date Type Provider Dept   02/13/24 Appointment Emma Isbell MD [facility]   Showing future appointments within next 150 days with a meds authorizing provider and meeting all other requirements     10/12/2023

## 2024-02-12 NOTE — TELEPHONE ENCOUNTER
Recent Visits  Date Type Provider Dept   10/12/23 Office Visit Emma Isbell MD [facility]   04/12/23 Office Visit Emma Isbell MD [facility]   01/10/23 Office Visit Chip Chu, APRN - CNP [facility]   12/06/22 Office Visit Emma Isbell MD [facility]   Showing recent visits within past 540 days with a meds authorizing provider and meeting all other requirements  Future Appointments  Date Type Provider Dept   02/13/24 Appointment Emma Isbell MD [facility]   Showing future appointments within next 150 days with a meds authorizing provider and meeting all other requirements     10/12/2023

## 2024-02-13 ENCOUNTER — OFFICE VISIT (OUTPATIENT)
Dept: INTERNAL MEDICINE CLINIC | Age: 54
End: 2024-02-13
Payer: MEDICAID

## 2024-02-13 VITALS
DIASTOLIC BLOOD PRESSURE: 76 MMHG | SYSTOLIC BLOOD PRESSURE: 120 MMHG | HEART RATE: 70 BPM | BODY MASS INDEX: 34.28 KG/M2 | OXYGEN SATURATION: 100 % | WEIGHT: 206 LBS

## 2024-02-13 DIAGNOSIS — I10 HTN, GOAL BELOW 140/90: ICD-10-CM

## 2024-02-13 DIAGNOSIS — Z13.9 ENCOUNTER FOR HEALTH-RELATED SCREENING: Primary | ICD-10-CM

## 2024-02-13 PROCEDURE — G8484 FLU IMMUNIZE NO ADMIN: HCPCS | Performed by: INTERNAL MEDICINE

## 2024-02-13 PROCEDURE — G8427 DOCREV CUR MEDS BY ELIG CLIN: HCPCS | Performed by: INTERNAL MEDICINE

## 2024-02-13 PROCEDURE — 1036F TOBACCO NON-USER: CPT | Performed by: INTERNAL MEDICINE

## 2024-02-13 PROCEDURE — 3017F COLORECTAL CA SCREEN DOC REV: CPT | Performed by: INTERNAL MEDICINE

## 2024-02-13 PROCEDURE — 99214 OFFICE O/P EST MOD 30 MIN: CPT | Performed by: INTERNAL MEDICINE

## 2024-02-13 PROCEDURE — 3074F SYST BP LT 130 MM HG: CPT | Performed by: INTERNAL MEDICINE

## 2024-02-13 PROCEDURE — G8417 CALC BMI ABV UP PARAM F/U: HCPCS | Performed by: INTERNAL MEDICINE

## 2024-02-13 PROCEDURE — 3078F DIAST BP <80 MM HG: CPT | Performed by: INTERNAL MEDICINE

## 2024-02-13 RX ORDER — ATENOLOL 100 MG/1
100 TABLET ORAL DAILY
Qty: 90 TABLET | Refills: 1 | Status: SHIPPED | OUTPATIENT
Start: 2024-02-13

## 2024-02-13 RX ORDER — IRBESARTAN 150 MG/1
150 TABLET ORAL DAILY
Qty: 90 TABLET | Refills: 0 | Status: SHIPPED | OUTPATIENT
Start: 2024-02-13

## 2024-02-13 RX ORDER — IRBESARTAN 150 MG/1
150 TABLET ORAL DAILY
Qty: 90 TABLET | Refills: 0 | OUTPATIENT
Start: 2024-02-13

## 2024-02-13 RX ORDER — TRIAMTERENE AND HYDROCHLOROTHIAZIDE 75; 50 MG/1; MG/1
0.5 TABLET ORAL DAILY
Qty: 45 TABLET | Refills: 1 | Status: SHIPPED | OUTPATIENT
Start: 2024-02-13 | End: 2025-06-04

## 2024-02-13 RX ORDER — ATENOLOL 100 MG/1
100 TABLET ORAL DAILY
Qty: 90 TABLET | Refills: 1 | OUTPATIENT
Start: 2024-02-13

## 2024-02-13 RX ORDER — TRIAMTERENE AND HYDROCHLOROTHIAZIDE 75; 50 MG/1; MG/1
0.5 TABLET ORAL DAILY
Qty: 45 TABLET | Refills: 1 | OUTPATIENT
Start: 2024-02-13 | End: 2025-06-04

## 2024-02-13 NOTE — PROGRESS NOTES
once daily 90 tablet 1    omeprazole (PRILOSEC) 20 MG delayed release capsule Take 1 capsule by mouth daily For heartburn 90 capsule 1    Ascorbic Acid (VITAMIN C) 1000 MG tablet Take 1 tablet by mouth daily      Multiple Vitamins-Minerals (THERAPEUTIC MULTIVITAMIN-MINERALS) tablet Take 1 tablet by mouth daily      aspirin 81 MG tablet Take 1 tablet by mouth daily      Cholecalciferol (VITAMIN D3) 5000 units TABS Take by mouth       No current facility-administered medications for this visit.       Vitals:    02/13/24 1242   BP: 120/76   Site: Right Upper Arm   Position: Sitting   Cuff Size: Large Adult   Pulse: 70   SpO2: 100%   Weight: 93.4 kg (206 lb)     Body mass index is 34.28 kg/m².     Wt Readings from Last 3 Encounters:   02/13/24 93.4 kg (206 lb)   10/12/23 107.5 kg (237 lb)   04/12/23 109.6 kg (241 lb 9.6 oz)     BP Readings from Last 3 Encounters:   02/13/24 120/76   10/12/23 126/72   04/12/23 122/78       Objective   Physical Exam  Vitals and nursing note reviewed.   Constitutional:       General: She is not in acute distress.     Appearance: Normal appearance. She is well-developed. She is obese.   HENT:      Head: Normocephalic and atraumatic.      Nose: Nose normal.      Mouth/Throat:      Mouth: Mucous membranes are moist.   Eyes:      Conjunctiva/sclera: Conjunctivae normal.      Pupils: Pupils are equal, round, and reactive to light.   Cardiovascular:      Rate and Rhythm: Normal rate and regular rhythm.      Pulses: Normal pulses.      Heart sounds: Normal heart sounds. No murmur heard.  Pulmonary:      Effort: Pulmonary effort is normal. No respiratory distress.      Breath sounds: Normal breath sounds.   Musculoskeletal:         General: Normal range of motion.      Cervical back: Normal range of motion.   Skin:     General: Skin is warm.      Capillary Refill: Capillary refill takes less than 2 seconds.   Neurological:      General: No focal deficit present.      Mental Status: She is alert

## 2024-04-28 DIAGNOSIS — J30.9 ALLERGIC RHINITIS, UNSPECIFIED SEASONALITY, UNSPECIFIED TRIGGER: ICD-10-CM

## 2024-04-30 RX ORDER — CETIRIZINE HYDROCHLORIDE 10 MG/1
TABLET ORAL
Qty: 90 TABLET | Refills: 1 | Status: SHIPPED | OUTPATIENT
Start: 2024-04-30

## 2024-04-30 NOTE — TELEPHONE ENCOUNTER
Recent Visits  Date Type Provider Dept   02/13/24 Office Visit Emma Isbell MD [facility]   10/12/23 Office Visit Emma Isbell MD [facility]   04/12/23 Office Visit Emma Isbell MD [facility]   01/10/23 Office Visit Chip Chu, APRN - CNP [facility]   12/06/22 Office Visit Emma Isbell MD [facility]   Showing recent visits within past 540 days with a meds authorizing provider and meeting all other requirements  Future Appointments  Date Type Provider Dept   08/13/24 Appointment Emma Isbell MD [facility]   Showing future appointments within next 150 days with a meds authorizing provider and meeting all other requirements     2/13/2024

## 2024-05-24 DIAGNOSIS — I10 HTN, GOAL BELOW 140/90: ICD-10-CM

## 2024-05-28 RX ORDER — TRIAMTERENE AND HYDROCHLOROTHIAZIDE 75; 50 MG/1; MG/1
0.5 TABLET ORAL DAILY
Qty: 45 TABLET | Refills: 1 | OUTPATIENT
Start: 2024-05-28 | End: 2025-09-17

## 2024-05-28 RX ORDER — ATENOLOL 100 MG/1
100 TABLET ORAL DAILY
Qty: 90 TABLET | Refills: 1 | OUTPATIENT
Start: 2024-05-28

## 2024-07-17 ENCOUNTER — HOSPITAL ENCOUNTER (EMERGENCY)
Age: 54
Discharge: HOME | End: 2024-07-18
Payer: MEDICAID

## 2024-07-17 VITALS
WEIGHT: 200 LBS | TEMPERATURE: 99.1 F | RESPIRATION RATE: 16 BRPM | BODY MASS INDEX: 32.14 KG/M2 | DIASTOLIC BLOOD PRESSURE: 82 MMHG | SYSTOLIC BLOOD PRESSURE: 145 MMHG | HEIGHT: 66 IN | OXYGEN SATURATION: 100 % | HEART RATE: 72 BPM

## 2024-07-17 DIAGNOSIS — R09.81 NASAL CONGESTION: Primary | ICD-10-CM

## 2024-07-17 PROCEDURE — 99283 EMERGENCY DEPT VISIT LOW MDM: CPT

## 2024-07-17 ASSESSMENT — PAIN - FUNCTIONAL ASSESSMENT: PAIN_FUNCTIONAL_ASSESSMENT: 0-10

## 2024-07-17 ASSESSMENT — PAIN SCALES - GENERAL: PAINLEVEL_OUTOF10: 6

## 2024-07-18 LAB
FLUAV RNA RESP QL NAA+PROBE: NOT DETECTED
FLUBV RNA RESP QL NAA+PROBE: NOT DETECTED
SARS-COV-2 RNA RESP QL NAA+PROBE: NOT DETECTED

## 2024-07-18 PROCEDURE — 87636 SARSCOV2 & INF A&B AMP PRB: CPT

## 2024-07-18 PROCEDURE — 6370000000 HC RX 637 (ALT 250 FOR IP): Performed by: PHYSICIAN ASSISTANT

## 2024-07-18 RX ORDER — IBUPROFEN 600 MG/1
600 TABLET ORAL ONCE
Status: COMPLETED | OUTPATIENT
Start: 2024-07-18 | End: 2024-07-18

## 2024-07-18 RX ORDER — FLUTICASONE PROPIONATE 50 MCG
1 SPRAY, SUSPENSION (ML) NASAL DAILY
Qty: 32 G | Refills: 1 | Status: SHIPPED | OUTPATIENT
Start: 2024-07-18

## 2024-07-18 RX ORDER — ACETAMINOPHEN 500 MG
1000 TABLET ORAL ONCE
Status: COMPLETED | OUTPATIENT
Start: 2024-07-18 | End: 2024-07-18

## 2024-07-18 RX ORDER — CETIRIZINE HYDROCHLORIDE 10 MG/1
10 TABLET ORAL ONCE
Status: DISCONTINUED | OUTPATIENT
Start: 2024-07-18 | End: 2024-07-18 | Stop reason: HOSPADM

## 2024-07-18 RX ORDER — CETIRIZINE HYDROCHLORIDE 10 MG/1
10 TABLET ORAL DAILY
Qty: 30 TABLET | Refills: 0 | Status: SHIPPED | OUTPATIENT
Start: 2024-07-18

## 2024-07-18 RX ADMIN — IBUPROFEN 600 MG: 600 TABLET, FILM COATED ORAL at 00:27

## 2024-07-18 RX ADMIN — ACETAMINOPHEN 1000 MG: 500 TABLET ORAL at 00:27

## 2024-07-18 ASSESSMENT — LIFESTYLE VARIABLES
HOW MANY STANDARD DRINKS CONTAINING ALCOHOL DO YOU HAVE ON A TYPICAL DAY: PATIENT DOES NOT DRINK
HOW OFTEN DO YOU HAVE A DRINK CONTAINING ALCOHOL: NEVER

## 2024-07-18 ASSESSMENT — ENCOUNTER SYMPTOMS
NAUSEA: 0
DIARRHEA: 0
SINUS PRESSURE: 1
VOMITING: 0
COUGH: 1
RHINORRHEA: 0
ABDOMINAL PAIN: 0
SHORTNESS OF BREATH: 0

## 2024-07-18 NOTE — ED PROVIDER NOTES
145/82  Pulse: 72             PHYSICAL EXAM  1 or more Elements     ED Triage Vitals [07/17/24 2357]   BP Temp Temp Source Pulse Respirations SpO2 Height Weight - Scale   (!) 145/82 99.1 °F (37.3 °C) Oral 72 16 100 % 1.676 m (5' 6\") 90.7 kg (200 lb)       Physical Exam  Vitals and nursing note reviewed.   Constitutional:       Appearance: She is well-developed. She is not diaphoretic.   HENT:      Head: Normocephalic and atraumatic.      Right Ear: External ear normal.      Left Ear: External ear normal.      Nose: Congestion present.      Comments: Boggy nasal turbinates noted bilaterally     Mouth/Throat:      Mouth: Mucous membranes are moist.      Pharynx: Oropharynx is clear. No posterior oropharyngeal erythema.      Comments: Postnasal drip noted  Eyes:      General:         Right eye: No discharge.         Left eye: No discharge.   Neck:      Trachea: No tracheal deviation.   Cardiovascular:      Rate and Rhythm: Normal rate and regular rhythm.   Pulmonary:      Effort: Pulmonary effort is normal. No respiratory distress.      Breath sounds: Normal breath sounds. No wheezing or rales.   Musculoskeletal:         General: Normal range of motion.      Cervical back: Normal range of motion and neck supple.   Skin:     General: Skin is warm and dry.   Neurological:      Mental Status: She is alert and oriented to person, place, and time.   Psychiatric:         Behavior: Behavior normal.             DIAGNOSTIC RESULTS   LABS:    Labs Reviewed   COVID-19 & INFLUENZA COMBO       When ordered only abnormal lab results are displayed. All other labs were within normal range or not returned as of this dictation.    EKG: When ordered, EKG's are interpreted by the Emergency Department Physician in the absence of a cardiologist.  Please see their note for interpretation of EKG.    RADIOLOGY:   Non-plain film images such as CT, Ultrasound and MRI are read by the radiologist. Plain radiographic images are visualized and

## 2024-07-22 ENCOUNTER — TELEPHONE (OUTPATIENT)
Dept: INTERNAL MEDICINE CLINIC | Age: 54
End: 2024-07-22

## 2024-07-22 NOTE — TELEPHONE ENCOUNTER
Kamala Gloria  P [facility] [place] [facility]  Staff3 days ago       Appointment Request From: Kamala Gloria     With Provider: LISET PICKETT MD [[facility]]     Preferred Date Range: Any date 7/22/2024 or later     Preferred Times: Any Time     Reason for visit: Request an Appointment     Comments:  I went to the ER Wed July 17th. I had nasal congestion. I need a follow up    *Detailed message left on a/m for patient to contact the office to discuss an ED f/u appt.  Patient will only need a f/u if she is still not feeling well on the treatment plan given in the ED.

## 2024-07-22 NOTE — TELEPHONE ENCOUNTER
----- Message from Hoa Acosta MA sent at 7/22/2024  9:46 AM EDT -----  Regarding: FW: ECC Appointment Request    ----- Message -----  From: Julienne Trejo  Sent: 7/19/2024  10:52 AM EDT  To: #  Subject: ECC Appointment Request                          ECC Appointment Request    Patient needs appointment for ECC Appointment Type: ED Follow-Up.    Patient Requested Dates(s): July 22, 2024  Patient Requested Time: 11:30 AM or 12:00 NN  Provider Name: Emma Isbell MD    Reason for Appointment Request: Established Patient - No appointments available during search  --------------------------------------------------------------------------------------------------------------------------    Relationship to Patient: Self     Call Back Information: OK to leave message on voicemail  Preferred Call Back Number: Phone [phone]    Patient needs an appointment for her ED Follow up as she was just from ER on July 17, 2024.

## 2024-07-24 DIAGNOSIS — Z13.9 ENCOUNTER FOR HEALTH-RELATED SCREENING: ICD-10-CM

## 2024-07-24 LAB
ALBUMIN SERPL-MCNC: 4.2 G/DL (ref 3.4–5)
ALBUMIN/GLOB SERPL: 1.3 {RATIO} (ref 1.1–2.2)
ALP SERPL-CCNC: 92 U/L (ref 40–129)
ALT SERPL-CCNC: 12 U/L (ref 10–40)
ANION GAP SERPL CALCULATED.3IONS-SCNC: 9 MMOL/L (ref 3–16)
AST SERPL-CCNC: 16 U/L (ref 15–37)
BILIRUB SERPL-MCNC: <0.2 MG/DL (ref 0–1)
BUN SERPL-MCNC: 14 MG/DL (ref 7–20)
CALCIUM SERPL-MCNC: 10.6 MG/DL (ref 8.3–10.6)
CHLORIDE SERPL-SCNC: 103 MMOL/L (ref 99–110)
CHOLEST SERPL-MCNC: 233 MG/DL (ref 0–199)
CO2 SERPL-SCNC: 26 MMOL/L (ref 21–32)
CREAT SERPL-MCNC: 1.1 MG/DL (ref 0.6–1.1)
GFR SERPLBLD CREATININE-BSD FMLA CKD-EPI: 60 ML/MIN/{1.73_M2}
GLUCOSE P FAST SERPL-MCNC: 97 MG/DL (ref 70–99)
HDLC SERPL-MCNC: 72 MG/DL (ref 40–60)
LDL CHOLESTEROL: 145 MG/DL
POTASSIUM SERPL-SCNC: 4.1 MMOL/L (ref 3.5–5.1)
PROT SERPL-MCNC: 7.5 G/DL (ref 6.4–8.2)
SODIUM SERPL-SCNC: 138 MMOL/L (ref 136–145)
TRIGL SERPL-MCNC: 82 MG/DL (ref 0–150)
TSH SERPL DL<=0.005 MIU/L-ACNC: 0.03 UIU/ML (ref 0.27–4.2)
VLDLC SERPL CALC-MCNC: 16 MG/DL

## 2024-07-25 LAB
EST. AVERAGE GLUCOSE BLD GHB EST-MCNC: 102.5 MG/DL
HBA1C MFR BLD: 5.2 %

## 2024-08-13 ENCOUNTER — OFFICE VISIT (OUTPATIENT)
Dept: INTERNAL MEDICINE CLINIC | Age: 54
End: 2024-08-13
Payer: MEDICAID

## 2024-08-13 VITALS
HEART RATE: 86 BPM | OXYGEN SATURATION: 98 % | WEIGHT: 185 LBS | DIASTOLIC BLOOD PRESSURE: 64 MMHG | SYSTOLIC BLOOD PRESSURE: 112 MMHG | BODY MASS INDEX: 29.86 KG/M2

## 2024-08-13 DIAGNOSIS — Z12.31 BREAST CANCER SCREENING BY MAMMOGRAM: ICD-10-CM

## 2024-08-13 DIAGNOSIS — Z00.00 ENCOUNTER FOR WELL ADULT EXAM WITHOUT ABNORMAL FINDINGS: Primary | ICD-10-CM

## 2024-08-13 DIAGNOSIS — I10 HTN, GOAL BELOW 140/90: ICD-10-CM

## 2024-08-13 DIAGNOSIS — R79.89 ELEVATED SERUM CREATININE: ICD-10-CM

## 2024-08-13 DIAGNOSIS — J30.1 SEASONAL ALLERGIC RHINITIS DUE TO POLLEN: ICD-10-CM

## 2024-08-13 PROCEDURE — G8427 DOCREV CUR MEDS BY ELIG CLIN: HCPCS | Performed by: INTERNAL MEDICINE

## 2024-08-13 PROCEDURE — 99396 PREV VISIT EST AGE 40-64: CPT | Performed by: INTERNAL MEDICINE

## 2024-08-13 PROCEDURE — G8417 CALC BMI ABV UP PARAM F/U: HCPCS | Performed by: INTERNAL MEDICINE

## 2024-08-13 PROCEDURE — 3074F SYST BP LT 130 MM HG: CPT | Performed by: INTERNAL MEDICINE

## 2024-08-13 PROCEDURE — 1036F TOBACCO NON-USER: CPT | Performed by: INTERNAL MEDICINE

## 2024-08-13 PROCEDURE — 99213 OFFICE O/P EST LOW 20 MIN: CPT | Performed by: INTERNAL MEDICINE

## 2024-08-13 PROCEDURE — 3017F COLORECTAL CA SCREEN DOC REV: CPT | Performed by: INTERNAL MEDICINE

## 2024-08-13 PROCEDURE — 3078F DIAST BP <80 MM HG: CPT | Performed by: INTERNAL MEDICINE

## 2024-08-13 RX ORDER — IRBESARTAN 150 MG/1
150 TABLET ORAL DAILY
Qty: 90 TABLET | Refills: 1 | Status: SHIPPED | OUTPATIENT
Start: 2024-08-13

## 2024-08-13 RX ORDER — TRIAMTERENE AND HYDROCHLOROTHIAZIDE 75; 50 MG/1; MG/1
0.5 TABLET ORAL DAILY
Qty: 45 TABLET | Refills: 1 | Status: SHIPPED | OUTPATIENT
Start: 2024-08-13 | End: 2025-12-03

## 2024-08-13 RX ORDER — ATENOLOL 100 MG/1
100 TABLET ORAL DAILY
Qty: 90 TABLET | Refills: 1 | Status: SHIPPED | OUTPATIENT
Start: 2024-08-13

## 2024-08-13 SDOH — ECONOMIC STABILITY: FOOD INSECURITY: WITHIN THE PAST 12 MONTHS, YOU WORRIED THAT YOUR FOOD WOULD RUN OUT BEFORE YOU GOT MONEY TO BUY MORE.: SOMETIMES TRUE

## 2024-08-13 SDOH — ECONOMIC STABILITY: FOOD INSECURITY: WITHIN THE PAST 12 MONTHS, THE FOOD YOU BOUGHT JUST DIDN'T LAST AND YOU DIDN'T HAVE MONEY TO GET MORE.: SOMETIMES TRUE

## 2024-08-13 SDOH — ECONOMIC STABILITY: INCOME INSECURITY: HOW HARD IS IT FOR YOU TO PAY FOR THE VERY BASICS LIKE FOOD, HOUSING, MEDICAL CARE, AND HEATING?: NOT HARD AT ALL

## 2024-08-13 ASSESSMENT — ANXIETY QUESTIONNAIRES
4. TROUBLE RELAXING: NOT AT ALL
6. BECOMING EASILY ANNOYED OR IRRITABLE: NOT AT ALL
5. BEING SO RESTLESS THAT IT IS HARD TO SIT STILL: NOT AT ALL
IF YOU CHECKED OFF ANY PROBLEMS ON THIS QUESTIONNAIRE, HOW DIFFICULT HAVE THESE PROBLEMS MADE IT FOR YOU TO DO YOUR WORK, TAKE CARE OF THINGS AT HOME, OR GET ALONG WITH OTHER PEOPLE: NOT DIFFICULT AT ALL
GAD7 TOTAL SCORE: 0
1. FEELING NERVOUS, ANXIOUS, OR ON EDGE: NOT AT ALL
2. NOT BEING ABLE TO STOP OR CONTROL WORRYING: NOT AT ALL
7. FEELING AFRAID AS IF SOMETHING AWFUL MIGHT HAPPEN: NOT AT ALL
3. WORRYING TOO MUCH ABOUT DIFFERENT THINGS: NOT AT ALL

## 2024-08-13 ASSESSMENT — PATIENT HEALTH QUESTIONNAIRE - PHQ9
SUM OF ALL RESPONSES TO PHQ QUESTIONS 1-9: 0
9. THOUGHTS THAT YOU WOULD BE BETTER OFF DEAD, OR OF HURTING YOURSELF: NOT AT ALL
SUM OF ALL RESPONSES TO PHQ QUESTIONS 1-9: 0
2. FEELING DOWN, DEPRESSED OR HOPELESS: NOT AT ALL
10. IF YOU CHECKED OFF ANY PROBLEMS, HOW DIFFICULT HAVE THESE PROBLEMS MADE IT FOR YOU TO DO YOUR WORK, TAKE CARE OF THINGS AT HOME, OR GET ALONG WITH OTHER PEOPLE: NOT DIFFICULT AT ALL
SUM OF ALL RESPONSES TO PHQ QUESTIONS 1-9: 0
5. POOR APPETITE OR OVEREATING: NOT AT ALL
6. FEELING BAD ABOUT YOURSELF - OR THAT YOU ARE A FAILURE OR HAVE LET YOURSELF OR YOUR FAMILY DOWN: NOT AT ALL
1. LITTLE INTEREST OR PLEASURE IN DOING THINGS: NOT AT ALL
8. MOVING OR SPEAKING SO SLOWLY THAT OTHER PEOPLE COULD HAVE NOTICED. OR THE OPPOSITE, BEING SO FIGETY OR RESTLESS THAT YOU HAVE BEEN MOVING AROUND A LOT MORE THAN USUAL: NOT AT ALL
SUM OF ALL RESPONSES TO PHQ QUESTIONS 1-9: 0
SUM OF ALL RESPONSES TO PHQ9 QUESTIONS 1 & 2: 0
7. TROUBLE CONCENTRATING ON THINGS, SUCH AS READING THE NEWSPAPER OR WATCHING TELEVISION: NOT AT ALL
4. FEELING TIRED OR HAVING LITTLE ENERGY: NOT AT ALL
3. TROUBLE FALLING OR STAYING ASLEEP: NOT AT ALL

## 2024-09-06 DIAGNOSIS — J30.9 ALLERGIC RHINITIS, UNSPECIFIED SEASONALITY, UNSPECIFIED TRIGGER: ICD-10-CM

## 2024-09-09 RX ORDER — CETIRIZINE HYDROCHLORIDE 10 MG/1
TABLET ORAL
Qty: 90 TABLET | Refills: 1 | Status: SHIPPED | OUTPATIENT
Start: 2024-09-09

## 2024-10-30 DIAGNOSIS — R79.89 ELEVATED SERUM CREATININE: ICD-10-CM

## 2024-10-30 LAB
ALBUMIN SERPL-MCNC: 3.9 G/DL (ref 3.4–5)
ANION GAP SERPL CALCULATED.3IONS-SCNC: 7 MMOL/L (ref 3–16)
BUN SERPL-MCNC: 15 MG/DL (ref 7–20)
CALCIUM SERPL-MCNC: 9.7 MG/DL (ref 8.3–10.6)
CHLORIDE SERPL-SCNC: 107 MMOL/L (ref 99–110)
CO2 SERPL-SCNC: 26 MMOL/L (ref 21–32)
CREAT SERPL-MCNC: 0.9 MG/DL (ref 0.6–1.1)
GFR SERPLBLD CREATININE-BSD FMLA CKD-EPI: 76 ML/MIN/{1.73_M2}
GLUCOSE SERPL-MCNC: 83 MG/DL (ref 70–99)
PHOSPHATE SERPL-MCNC: 3.7 MG/DL (ref 2.5–4.9)
POTASSIUM SERPL-SCNC: 4 MMOL/L (ref 3.5–5.1)
SODIUM SERPL-SCNC: 140 MMOL/L (ref 136–145)

## 2024-11-04 LAB
CHOLEST SERPL-MCNC: 230 MG/DL
HDL SERPL QN: 9.7 NM
HDL SERPL-SCNC: 29.6 UMOL/L
HDLC SERPL-MCNC: 68 MG/DL (ref 40–59)
HLD.LARGE SERPL-SCNC: 11.8 UMOL/L
LDL SERPL QN: 22 NM
LDL SERPL-SCNC: 1389 NMOL/L
LDL SMALL SERPL-SCNC: <165 NMOL/L
LDLC SERPL CALC-MCNC: 146 MG/DL
PATHOLOGY STUDY: ABNORMAL
TRIGL SERPL-MCNC: 80 MG/DL (ref 30–149)
VLDL LARGE SERPL-SCNC: <1.5 NMOL/L
VLDL SERPL QN: 39.4 NM

## 2024-12-22 DIAGNOSIS — J30.9 ALLERGIC RHINITIS, UNSPECIFIED SEASONALITY, UNSPECIFIED TRIGGER: ICD-10-CM

## 2024-12-22 NOTE — TELEPHONE ENCOUNTER
Recent Visits  Date Type Provider Dept   08/13/24 Office Visit Emma Isbell MD [facility]   02/13/24 Office Visit Emma Isbell MD [facility]   10/12/23 Office Visit Emma Isbell MD [facility]   Showing recent visits within past 540 days with a meds authorizing provider and meeting all other requirements  Future Appointments  Date Type Provider Dept   02/13/25 Appointment Emma Isbell MD [facility]   Showing future appointments within next 150 days with a meds authorizing provider and meeting all other requirements     8/13/2024

## 2024-12-23 RX ORDER — CETIRIZINE HYDROCHLORIDE 10 MG/1
TABLET ORAL
Qty: 90 TABLET | Refills: 1 | Status: SHIPPED | OUTPATIENT
Start: 2024-12-23

## 2025-01-11 DIAGNOSIS — I10 HTN, GOAL BELOW 140/90: ICD-10-CM

## 2025-01-13 RX ORDER — TRIAMTERENE AND HYDROCHLOROTHIAZIDE 75; 50 MG/1; MG/1
0.5 TABLET ORAL DAILY
Qty: 45 TABLET | Refills: 0 | Status: SHIPPED | OUTPATIENT
Start: 2025-01-13 | End: 2026-05-05

## 2025-03-20 DIAGNOSIS — I10 HTN, GOAL BELOW 140/90: ICD-10-CM

## 2025-03-20 RX ORDER — ATENOLOL 100 MG/1
100 TABLET ORAL DAILY
Qty: 30 TABLET | Refills: 0 | Status: SHIPPED | OUTPATIENT
Start: 2025-03-20

## 2025-03-20 NOTE — TELEPHONE ENCOUNTER
Recent Visits  Date Type Provider Dept   08/13/24 Office Visit Emma Isbell MD [facility]   02/13/24 Office Visit Emma Isbell MD [facility]   10/12/23 Office Visit Emma Isbell MD [facility]   Showing recent visits within past 540 days with a meds authorizing provider and meeting all other requirements  Future Appointments  Date Type Provider Dept   03/31/25 Appointment Emma Isbell MD [facility]   Showing future appointments within next 150 days with a meds authorizing provider and meeting all other requirements     8/13/2024

## 2025-03-29 ENCOUNTER — HOSPITAL ENCOUNTER (EMERGENCY)
Age: 55
Discharge: HOME | End: 2025-03-29
Payer: MEDICAID

## 2025-03-29 VITALS
DIASTOLIC BLOOD PRESSURE: 95 MMHG | OXYGEN SATURATION: 98 % | RESPIRATION RATE: 16 BRPM | HEART RATE: 75 BPM | SYSTOLIC BLOOD PRESSURE: 158 MMHG | HEIGHT: 65 IN | TEMPERATURE: 98.2 F | WEIGHT: 197.5 LBS | BODY MASS INDEX: 32.9 KG/M2

## 2025-03-29 DIAGNOSIS — J39.2 THROAT IRRITATION: Primary | ICD-10-CM

## 2025-03-29 PROCEDURE — 99283 EMERGENCY DEPT VISIT LOW MDM: CPT

## 2025-03-29 ASSESSMENT — ENCOUNTER SYMPTOMS
FACIAL SWELLING: 0
STRIDOR: 0
COUGH: 0
TROUBLE SWALLOWING: 0
SINUS PRESSURE: 0
APNEA: 0
DIARRHEA: 0
CONSTIPATION: 0
VOICE CHANGE: 0
CHOKING: 0
ABDOMINAL PAIN: 0
CHEST TIGHTNESS: 0
COLOR CHANGE: 0
SORE THROAT: 0
BACK PAIN: 0
VOMITING: 0
WHEEZING: 0
RESPIRATORY NEGATIVE: 1
NAUSEA: 0
SINUS PAIN: 0
RHINORRHEA: 0
SHORTNESS OF BREATH: 0

## 2025-03-29 ASSESSMENT — LIFESTYLE VARIABLES
HOW OFTEN DO YOU HAVE A DRINK CONTAINING ALCOHOL: NEVER
HOW MANY STANDARD DRINKS CONTAINING ALCOHOL DO YOU HAVE ON A TYPICAL DAY: PATIENT DOES NOT DRINK

## 2025-03-29 ASSESSMENT — PAIN SCALES - GENERAL: PAINLEVEL_OUTOF10: 6

## 2025-03-29 NOTE — ED PROVIDER NOTES
[facility] EMERGENCY DEPARTMENT  EMERGENCY DEPARTMENT ENCOUNTER        Pt Name: Kamala Gloria  MRN: [id number]  Birthdate 1970  Date of evaluation: 3/29/2025  Provider: ZAIN Rg  PCP: Emma Isbell MD  Note Started: 4:59 PM EDT 3/29/25      ALEIDA. I have evaluated this patient.        CHIEF COMPLAINT       Chief Complaint   Patient presents with    Tingling     Pt reports that she was taking a \"detox\" with sylvie and she is concerned that she is allergic to it. Pt c/o mouth and throat tingling. Pt denies swelling. No swelling noted. Pt able to speak in full sentences and manage own secretions       HISTORY OF PRESENT ILLNESS: 1 or more Elements     History From: Patient  Limitations to history : None    Kamala Gloria is a 54 y.o. female with past medical history of hypertension and hyperlipidemia who presents ED with complaint of burning to her mouth.  Patient reports a week ago she did a detox.  She states she drank raw sylvie and lemon.  She reports after doing this for several days she started noticing some burning and irritation to her mouth.  Patient states she thought she was having some irritation/possible allergic reaction to it.  She states she stopped.  She reports she stopped doing this several days ago.  Still having burning to the mouth.  States she feels like she ate something spicy.  She became concerned and came to the ED for further evaluation and treatment.  Denies any tongue or lip swelling.  Denies any facial swelling.  Denies drooling, trismus, stridor or respiratory distress.  Denies any changes in phonation.  No cough, chest pain or shortness of breath.  No nausea or vomiting.  No abdominal pain.  Has never had this reaction in the past    Nursing Notes were all reviewed and agreed with or any disagreements were addressed in the HPI.    REVIEW OF SYSTEMS :      Review of Systems   Constitutional:  Negative for activity change, appetite change, chills

## 2025-04-02 ENCOUNTER — PATIENT MESSAGE (OUTPATIENT)
Dept: INTERNAL MEDICINE CLINIC | Age: 55
End: 2025-04-02

## 2025-04-02 DIAGNOSIS — K12.30 MUCOSITIS ORAL: Primary | ICD-10-CM

## 2025-04-03 RX ORDER — LIDOCAINE HYDROCHLORIDE 20 MG/ML
15 SOLUTION OROPHARYNGEAL PRN
Qty: 100 ML | Refills: 0 | Status: ON HOLD | OUTPATIENT
Start: 2025-04-03

## 2025-04-03 NOTE — TELEPHONE ENCOUNTER
Recent Visits  Date Type Provider Dept   08/13/24 Office Visit Emma Isbell MD [facility]   02/13/24 Office Visit Emma Isbell MD [facility]   10/12/23 Office Visit Emma Isbell MD [facility]   Showing recent visits within past 540 days with a meds authorizing provider and meeting all other requirements  Future Appointments  Date Type Provider Dept   04/18/25 Appointment Emma Isbell MD [facility]   05/20/25 Appointment Emma Isbell MD [facility]   08/18/25 Appointment Emma Isbell MD [facility]   Showing future appointments within next 150 days with a meds authorizing provider and meeting all other requirements     8/13/2024

## 2025-04-04 DIAGNOSIS — I10 HTN, GOAL BELOW 140/90: ICD-10-CM

## 2025-04-04 NOTE — TELEPHONE ENCOUNTER
Recent Visits  Date Type Provider Dept   08/13/24 Office Visit Emma Isbell MD [facility]   02/13/24 Office Visit Emma Isbell MD [facility]   10/12/23 Office Visit Emma Isbell MD [facility]   Showing recent visits within past 540 days with a meds authorizing provider and meeting all other requirements  Future Appointments  Date Type Provider Dept   04/18/25 Appointment Emma Isbell MD [facility]   05/20/25 Appointment Emma Isbell MD [facility]   08/18/25 Appointment Emma Isbell MD [facility]   Showing future appointments within next 150 days with a meds authorizing provider and meeting all other requirements     8/13/2024

## 2025-04-05 RX ORDER — TRIAMTERENE AND HYDROCHLOROTHIAZIDE 75; 50 MG/1; MG/1
0.5 TABLET ORAL DAILY
Qty: 45 TABLET | Refills: 0 | Status: ON HOLD | OUTPATIENT
Start: 2025-04-05 | End: 2026-07-26

## 2025-04-06 ENCOUNTER — HOSPITAL ENCOUNTER (INPATIENT)
Age: 55
LOS: 1 days | Discharge: HOME | DRG: 426 | End: 2025-04-07
Attending: STUDENT IN AN ORGANIZED HEALTH CARE EDUCATION/TRAINING PROGRAM | Admitting: INTERNAL MEDICINE
Payer: MEDICAID

## 2025-04-06 DIAGNOSIS — T50.2X5A: ICD-10-CM

## 2025-04-06 DIAGNOSIS — E87.1 HYPONATREMIA: Primary | ICD-10-CM

## 2025-04-06 DIAGNOSIS — R42 DIZZINESS: ICD-10-CM

## 2025-04-06 DIAGNOSIS — E87.6 HYPOKALEMIA: ICD-10-CM

## 2025-04-06 DIAGNOSIS — E83.42 HYPOMAGNESEMIA: ICD-10-CM

## 2025-04-06 LAB
ALBUMIN SERPL-MCNC: 3.9 G/DL (ref 3.4–5)
ALBUMIN/GLOB SERPL: 1.2 {RATIO} (ref 1.1–2.2)
ALP SERPL-CCNC: 84 U/L (ref 40–129)
ALT SERPL-CCNC: 14 U/L (ref 10–40)
ANION GAP SERPL CALCULATED.3IONS-SCNC: 10 MMOL/L (ref 3–16)
ANION GAP SERPL CALCULATED.3IONS-SCNC: 11 MMOL/L (ref 3–16)
ANION GAP SERPL CALCULATED.3IONS-SCNC: 11 MMOL/L (ref 3–16)
ANION GAP SERPL CALCULATED.3IONS-SCNC: 12 MMOL/L (ref 3–16)
ANION GAP SERPL CALCULATED.3IONS-SCNC: 9 MMOL/L (ref 3–16)
AST SERPL-CCNC: 21 U/L (ref 15–37)
BASOPHILS # BLD: 0 K/UL (ref 0–0.2)
BASOPHILS NFR BLD: 0.9 %
BILIRUB SERPL-MCNC: 0.4 MG/DL (ref 0–1)
BILIRUB UR QL STRIP.AUTO: NEGATIVE
BUN SERPL-MCNC: 12 MG/DL (ref 7–20)
BUN SERPL-MCNC: 15 MG/DL (ref 7–20)
BUN SERPL-MCNC: 8 MG/DL (ref 7–20)
BUN SERPL-MCNC: 9 MG/DL (ref 7–20)
BUN SERPL-MCNC: 9 MG/DL (ref 7–20)
CALCIUM SERPL-MCNC: 9.4 MG/DL (ref 8.3–10.6)
CALCIUM SERPL-MCNC: 9.5 MG/DL (ref 8.3–10.6)
CALCIUM SERPL-MCNC: 9.7 MG/DL (ref 8.3–10.6)
CALCIUM SERPL-MCNC: 9.8 MG/DL (ref 8.3–10.6)
CALCIUM SERPL-MCNC: 9.9 MG/DL (ref 8.3–10.6)
CHLORIDE SERPL-SCNC: 100 MMOL/L (ref 99–110)
CHLORIDE SERPL-SCNC: 101 MMOL/L (ref 99–110)
CHLORIDE SERPL-SCNC: 87 MMOL/L (ref 99–110)
CHLORIDE SERPL-SCNC: 88 MMOL/L (ref 99–110)
CHLORIDE SERPL-SCNC: 95 MMOL/L (ref 99–110)
CLARITY UR: CLEAR
CO2 SERPL-SCNC: 22 MMOL/L (ref 21–32)
CO2 SERPL-SCNC: 23 MMOL/L (ref 21–32)
CO2 SERPL-SCNC: 23 MMOL/L (ref 21–32)
CO2 SERPL-SCNC: 24 MMOL/L (ref 21–32)
CO2 SERPL-SCNC: 25 MMOL/L (ref 21–32)
COLOR UR: YELLOW
CREAT SERPL-MCNC: 0.8 MG/DL (ref 0.6–1.1)
CREAT SERPL-MCNC: 0.9 MG/DL (ref 0.6–1.1)
CREAT SERPL-MCNC: 1 MG/DL (ref 0.6–1.1)
DEPRECATED RDW RBC AUTO: 12.7 % (ref 12.4–15.4)
EKG ATRIAL RATE: 80 BPM
EKG DIAGNOSIS: NORMAL
EKG P AXIS: 64 DEGREES
EKG P-R INTERVAL: 198 MS
EKG Q-T INTERVAL: 366 MS
EKG QRS DURATION: 92 MS
EKG QTC CALCULATION (BAZETT): 422 MS
EKG R AXIS: 43 DEGREES
EKG T AXIS: 71 DEGREES
EKG VENTRICULAR RATE: 80 BPM
EOSINOPHIL # BLD: 0 K/UL (ref 0–0.6)
EOSINOPHIL NFR BLD: 0.8 %
GFR SERPLBLD CREATININE-BSD FMLA CKD-EPI: 67 ML/MIN/{1.73_M2}
GFR SERPLBLD CREATININE-BSD FMLA CKD-EPI: 76 ML/MIN/{1.73_M2}
GFR SERPLBLD CREATININE-BSD FMLA CKD-EPI: 88 ML/MIN/{1.73_M2}
GLUCOSE SERPL-MCNC: 118 MG/DL (ref 70–99)
GLUCOSE SERPL-MCNC: 87 MG/DL (ref 70–99)
GLUCOSE SERPL-MCNC: 88 MG/DL (ref 70–99)
GLUCOSE SERPL-MCNC: 96 MG/DL (ref 70–99)
GLUCOSE SERPL-MCNC: 99 MG/DL (ref 70–99)
GLUCOSE UR STRIP.AUTO-MCNC: NEGATIVE MG/DL
HCT VFR BLD AUTO: 30.6 % (ref 36–48)
HGB BLD-MCNC: 10.3 G/DL (ref 12–16)
HGB UR QL STRIP.AUTO: NEGATIVE
KETONES UR STRIP.AUTO-MCNC: NEGATIVE MG/DL
LEUKOCYTE ESTERASE UR QL STRIP.AUTO: NEGATIVE
LIPASE SERPL-CCNC: 42 U/L (ref 13–60)
LYMPHOCYTES # BLD: 2 K/UL (ref 1–5.1)
LYMPHOCYTES NFR BLD: 53.4 %
MAGNESIUM SERPL-MCNC: 1.68 MG/DL (ref 1.8–2.4)
MCH RBC QN AUTO: 28.6 PG (ref 26–34)
MCHC RBC AUTO-ENTMCNC: 33.5 G/DL (ref 31–36)
MCV RBC AUTO: 85.4 FL (ref 80–100)
MONOCYTES # BLD: 0.4 K/UL (ref 0–1.3)
MONOCYTES NFR BLD: 9.7 %
NEUTROPHILS # BLD: 1.3 K/UL (ref 1.7–7.7)
NEUTROPHILS NFR BLD: 35.2 %
NITRITE UR QL STRIP.AUTO: NEGATIVE
NT-PROBNP SERPL-MCNC: 38 PG/ML (ref 0–124)
OSMOLALITY SERPL: 260 MOSM/KG (ref 275–295)
OSMOLALITY UR: 70 MOSM/KG (ref 390–1070)
PH UR STRIP.AUTO: 7 [PH] (ref 5–8)
PLATELET # BLD AUTO: 291 K/UL (ref 135–450)
PMV BLD AUTO: 7.3 FL (ref 5–10.5)
POTASSIUM SERPL-SCNC: 3.2 MMOL/L (ref 3.5–5.1)
POTASSIUM SERPL-SCNC: 3.4 MMOL/L (ref 3.5–5.1)
POTASSIUM SERPL-SCNC: 3.8 MMOL/L (ref 3.5–5.1)
POTASSIUM SERPL-SCNC: 4 MMOL/L (ref 3.5–5.1)
POTASSIUM SERPL-SCNC: 4.2 MMOL/L (ref 3.5–5.1)
PROT SERPL-MCNC: 7.1 G/DL (ref 6.4–8.2)
PROT UR STRIP.AUTO-MCNC: NEGATIVE MG/DL
RBC # BLD AUTO: 3.59 M/UL (ref 4–5.2)
SODIUM SERPL-SCNC: 122 MMOL/L (ref 136–145)
SODIUM SERPL-SCNC: 122 MMOL/L (ref 136–145)
SODIUM SERPL-SCNC: 130 MMOL/L (ref 136–145)
SODIUM SERPL-SCNC: 133 MMOL/L (ref 136–145)
SODIUM SERPL-SCNC: 134 MMOL/L (ref 136–145)
SODIUM UR-SCNC: <20 MMOL/L
SP GR UR STRIP.AUTO: <=1.005 (ref 1–1.03)
TROPONIN, HIGH SENSITIVITY: 14 NG/L (ref 0–14)
TROPONIN, HIGH SENSITIVITY: 15 NG/L (ref 0–14)
UA COMPLETE W REFLEX CULTURE PNL UR: NORMAL
UA DIPSTICK W REFLEX MICRO PNL UR: NORMAL
URATE SERPL-MCNC: 3.5 MG/DL (ref 2.6–6)
URN SPEC COLLECT METH UR: NORMAL
UROBILINOGEN UR STRIP-ACNC: 0.2 E.U./DL
WBC # BLD AUTO: 3.7 K/UL (ref 4–11)

## 2025-04-06 PROCEDURE — 99285 EMERGENCY DEPT VISIT HI MDM: CPT

## 2025-04-06 PROCEDURE — 83735 ASSAY OF MAGNESIUM: CPT

## 2025-04-06 PROCEDURE — G0378 HOSPITAL OBSERVATION PER HR: HCPCS

## 2025-04-06 PROCEDURE — 36415 COLL VENOUS BLD VENIPUNCTURE: CPT

## 2025-04-06 PROCEDURE — 84300 ASSAY OF URINE SODIUM: CPT

## 2025-04-06 PROCEDURE — 2580000003 HC RX 258: Performed by: INTERNAL MEDICINE

## 2025-04-06 PROCEDURE — 6370000000 HC RX 637 (ALT 250 FOR IP): Performed by: INTERNAL MEDICINE

## 2025-04-06 PROCEDURE — 93005 ELECTROCARDIOGRAM TRACING: CPT | Performed by: STUDENT IN AN ORGANIZED HEALTH CARE EDUCATION/TRAINING PROGRAM

## 2025-04-06 PROCEDURE — 6370000000 HC RX 637 (ALT 250 FOR IP): Performed by: PHYSICIAN ASSISTANT

## 2025-04-06 PROCEDURE — 96361 HYDRATE IV INFUSION ADD-ON: CPT

## 2025-04-06 PROCEDURE — 6370000000 HC RX 637 (ALT 250 FOR IP): Performed by: STUDENT IN AN ORGANIZED HEALTH CARE EDUCATION/TRAINING PROGRAM

## 2025-04-06 PROCEDURE — 83690 ASSAY OF LIPASE: CPT

## 2025-04-06 PROCEDURE — 84550 ASSAY OF BLOOD/URIC ACID: CPT

## 2025-04-06 PROCEDURE — 84484 ASSAY OF TROPONIN QUANT: CPT

## 2025-04-06 PROCEDURE — 93010 ELECTROCARDIOGRAM REPORT: CPT | Performed by: INTERNAL MEDICINE

## 2025-04-06 PROCEDURE — 99223 1ST HOSP IP/OBS HIGH 75: CPT | Performed by: INTERNAL MEDICINE

## 2025-04-06 PROCEDURE — 81003 URINALYSIS AUTO W/O SCOPE: CPT

## 2025-04-06 PROCEDURE — 2500000003 HC RX 250 WO HCPCS: Performed by: INTERNAL MEDICINE

## 2025-04-06 PROCEDURE — 80053 COMPREHEN METABOLIC PANEL: CPT

## 2025-04-06 PROCEDURE — 83880 ASSAY OF NATRIURETIC PEPTIDE: CPT

## 2025-04-06 PROCEDURE — 85025 COMPLETE CBC W/AUTO DIFF WBC: CPT

## 2025-04-06 PROCEDURE — 96360 HYDRATION IV INFUSION INIT: CPT

## 2025-04-06 PROCEDURE — 83935 ASSAY OF URINE OSMOLALITY: CPT

## 2025-04-06 PROCEDURE — 2580000003 HC RX 258: Performed by: STUDENT IN AN ORGANIZED HEALTH CARE EDUCATION/TRAINING PROGRAM

## 2025-04-06 PROCEDURE — 1200000000 HC SEMI PRIVATE

## 2025-04-06 PROCEDURE — 83930 ASSAY OF BLOOD OSMOLALITY: CPT

## 2025-04-06 RX ORDER — DESMOPRESSIN ACETATE 0.2 MG/1
100 TABLET ORAL ONCE
Status: COMPLETED | OUTPATIENT
Start: 2025-04-06 | End: 2025-04-06

## 2025-04-06 RX ORDER — LOSARTAN POTASSIUM 25 MG/1
50 TABLET ORAL DAILY
Status: DISCONTINUED | OUTPATIENT
Start: 2025-04-06 | End: 2025-04-07 | Stop reason: HOSPADM

## 2025-04-06 RX ORDER — DESMOPRESSIN ACETATE 4 UG/ML
1 INJECTION, SOLUTION INTRAVENOUS; SUBCUTANEOUS ONCE
Status: COMPLETED | OUTPATIENT
Start: 2025-04-07 | End: 2025-04-07

## 2025-04-06 RX ORDER — ATENOLOL 50 MG/1
100 TABLET ORAL DAILY
Status: DISCONTINUED | OUTPATIENT
Start: 2025-04-06 | End: 2025-04-07 | Stop reason: HOSPADM

## 2025-04-06 RX ORDER — SODIUM CHLORIDE 0.9 % (FLUSH) 0.9 %
5-40 SYRINGE (ML) INJECTION PRN
Status: DISCONTINUED | OUTPATIENT
Start: 2025-04-06 | End: 2025-04-07 | Stop reason: HOSPADM

## 2025-04-06 RX ORDER — 0.9 % SODIUM CHLORIDE 0.9 %
1000 INTRAVENOUS SOLUTION INTRAVENOUS ONCE
Status: COMPLETED | OUTPATIENT
Start: 2025-04-06 | End: 2025-04-06

## 2025-04-06 RX ORDER — LANOLIN ALCOHOL/MO/W.PET/CERES
400 CREAM (GRAM) TOPICAL DAILY
Status: DISCONTINUED | OUTPATIENT
Start: 2025-04-06 | End: 2025-04-06

## 2025-04-06 RX ORDER — ACETAMINOPHEN 325 MG/1
650 TABLET ORAL EVERY 6 HOURS PRN
Status: DISCONTINUED | OUTPATIENT
Start: 2025-04-06 | End: 2025-04-07 | Stop reason: HOSPADM

## 2025-04-06 RX ORDER — SODIUM CHLORIDE 0.9 % (FLUSH) 0.9 %
5-40 SYRINGE (ML) INJECTION EVERY 12 HOURS SCHEDULED
Status: DISCONTINUED | OUTPATIENT
Start: 2025-04-06 | End: 2025-04-07 | Stop reason: HOSPADM

## 2025-04-06 RX ORDER — ONDANSETRON 2 MG/ML
4 INJECTION INTRAMUSCULAR; INTRAVENOUS EVERY 6 HOURS PRN
Status: DISCONTINUED | OUTPATIENT
Start: 2025-04-06 | End: 2025-04-07 | Stop reason: HOSPADM

## 2025-04-06 RX ORDER — SODIUM CHLORIDE 9 MG/ML
INJECTION, SOLUTION INTRAVENOUS CONTINUOUS
Status: DISCONTINUED | OUTPATIENT
Start: 2025-04-06 | End: 2025-04-06

## 2025-04-06 RX ORDER — SODIUM CHLORIDE 9 MG/ML
INJECTION, SOLUTION INTRAVENOUS CONTINUOUS
Status: DISCONTINUED | OUTPATIENT
Start: 2025-04-06 | End: 2025-04-06 | Stop reason: SDUPTHER

## 2025-04-06 RX ORDER — DEXTROSE MONOHYDRATE 50 MG/ML
INJECTION, SOLUTION INTRAVENOUS CONTINUOUS
Status: DISCONTINUED | OUTPATIENT
Start: 2025-04-06 | End: 2025-04-07

## 2025-04-06 RX ORDER — POTASSIUM CHLORIDE 1500 MG/1
40 TABLET, EXTENDED RELEASE ORAL ONCE
Status: COMPLETED | OUTPATIENT
Start: 2025-04-06 | End: 2025-04-06

## 2025-04-06 RX ORDER — POTASSIUM CHLORIDE 1500 MG/1
40 TABLET, EXTENDED RELEASE ORAL PRN
Status: DISCONTINUED | OUTPATIENT
Start: 2025-04-06 | End: 2025-04-07 | Stop reason: HOSPADM

## 2025-04-06 RX ORDER — SODIUM CHLORIDE 9 MG/ML
INJECTION, SOLUTION INTRAVENOUS PRN
Status: DISCONTINUED | OUTPATIENT
Start: 2025-04-06 | End: 2025-04-07 | Stop reason: HOSPADM

## 2025-04-06 RX ORDER — ASPIRIN 81 MG/1
81 TABLET, CHEWABLE ORAL DAILY
Status: DISCONTINUED | OUTPATIENT
Start: 2025-04-06 | End: 2025-04-07 | Stop reason: HOSPADM

## 2025-04-06 RX ORDER — ONDANSETRON 4 MG/1
4 TABLET, ORALLY DISINTEGRATING ORAL EVERY 8 HOURS PRN
Status: DISCONTINUED | OUTPATIENT
Start: 2025-04-06 | End: 2025-04-07 | Stop reason: HOSPADM

## 2025-04-06 RX ORDER — LANOLIN ALCOHOL/MO/W.PET/CERES
400 CREAM (GRAM) TOPICAL ONCE
Status: COMPLETED | OUTPATIENT
Start: 2025-04-06 | End: 2025-04-06

## 2025-04-06 RX ORDER — POLYETHYLENE GLYCOL 3350 17 G/17G
17 POWDER, FOR SOLUTION ORAL DAILY PRN
Status: DISCONTINUED | OUTPATIENT
Start: 2025-04-06 | End: 2025-04-07 | Stop reason: HOSPADM

## 2025-04-06 RX ORDER — MAGNESIUM SULFATE IN WATER 40 MG/ML
2000 INJECTION, SOLUTION INTRAVENOUS PRN
Status: DISCONTINUED | OUTPATIENT
Start: 2025-04-06 | End: 2025-04-07 | Stop reason: HOSPADM

## 2025-04-06 RX ORDER — POTASSIUM CHLORIDE 7.45 MG/ML
10 INJECTION INTRAVENOUS PRN
Status: DISCONTINUED | OUTPATIENT
Start: 2025-04-06 | End: 2025-04-07 | Stop reason: HOSPADM

## 2025-04-06 RX ORDER — ACETAMINOPHEN 650 MG/1
650 SUPPOSITORY RECTAL EVERY 6 HOURS PRN
Status: DISCONTINUED | OUTPATIENT
Start: 2025-04-06 | End: 2025-04-07 | Stop reason: HOSPADM

## 2025-04-06 RX ORDER — DEXTROSE MONOHYDRATE 50 MG/ML
INJECTION, SOLUTION INTRAVENOUS CONTINUOUS
Status: ACTIVE | OUTPATIENT
Start: 2025-04-06 | End: 2025-04-06

## 2025-04-06 RX ORDER — ENOXAPARIN SODIUM 100 MG/ML
40 INJECTION SUBCUTANEOUS DAILY
Status: DISCONTINUED | OUTPATIENT
Start: 2025-04-06 | End: 2025-04-07 | Stop reason: HOSPADM

## 2025-04-06 RX ORDER — CETIRIZINE HYDROCHLORIDE 10 MG/1
10 TABLET ORAL DAILY
Status: DISCONTINUED | OUTPATIENT
Start: 2025-04-06 | End: 2025-04-07 | Stop reason: HOSPADM

## 2025-04-06 RX ADMIN — CETIRIZINE HYDROCHLORIDE 10 MG: 10 TABLET, FILM COATED ORAL at 11:57

## 2025-04-06 RX ADMIN — DESMOPRESSIN ACETATE 100 MCG: 0.2 TABLET ORAL at 15:50

## 2025-04-06 RX ADMIN — LIDOCAINE HYDROCHLORIDE: 20 SOLUTION ORAL at 02:54

## 2025-04-06 RX ADMIN — ATENOLOL 100 MG: 50 TABLET ORAL at 11:56

## 2025-04-06 RX ADMIN — POTASSIUM CHLORIDE 40 MEQ: 1500 TABLET, EXTENDED RELEASE ORAL at 04:15

## 2025-04-06 RX ADMIN — ASPIRIN 81 MG: 81 TABLET, CHEWABLE ORAL at 11:56

## 2025-04-06 RX ADMIN — DEXTROSE MONOHYDRATE: 50 INJECTION, SOLUTION INTRAVENOUS at 15:52

## 2025-04-06 RX ADMIN — Medication 400 MG: at 04:15

## 2025-04-06 RX ADMIN — LOSARTAN POTASSIUM 50 MG: 25 TABLET, FILM COATED ORAL at 11:56

## 2025-04-06 RX ADMIN — SODIUM CHLORIDE: 0.9 INJECTION, SOLUTION INTRAVENOUS at 10:27

## 2025-04-06 RX ADMIN — SODIUM CHLORIDE 1000 ML: 0.9 INJECTION, SOLUTION INTRAVENOUS at 04:51

## 2025-04-06 RX ADMIN — SODIUM CHLORIDE, PRESERVATIVE FREE 10 ML: 5 INJECTION INTRAVENOUS at 10:28

## 2025-04-06 ASSESSMENT — PAIN SCALES - GENERAL: PAINLEVEL_OUTOF10: 0

## 2025-04-06 NOTE — PLAN OF CARE
Problem: Discharge Planning  Goal: Discharge to home or other facility with appropriate resources  Outcome: Progressing  Flowsheets (Taken 4/6/2025 1015)  Discharge to home or other facility with appropriate resources: Identify barriers to discharge with patient and caregiver

## 2025-04-06 NOTE — CARE COORDINATION
Discharge Planning Note:    Chart reviewed and it appears that patient has minimal needs for discharge at this time. Risk Score 8 %     Primary Care Physician is LISET PICKETT   [place] insurance is [place] [employer] [place]    Patient is from home.    Please notify case management if any discharge needs are identified.      Case management will continue to follow progress and update discharge plan as needed.    Electronically signed by RAYMUNDO Rodriguez on 4/6/2025 at 9:49 AM

## 2025-04-06 NOTE — ED PROVIDER NOTES
agreed with or any disagreements were addressed in the HPI.    REVIEW OF SYSTEMS :      Review of Systems   Constitutional:  Negative for activity change, appetite change, chills and fever.   HENT:  Negative for congestion and rhinorrhea.    Respiratory:  Negative for cough and shortness of breath.    Cardiovascular:  Negative for chest pain.   Gastrointestinal:  Positive for abdominal pain. Negative for diarrhea, nausea and vomiting.   Genitourinary:  Negative for difficulty urinating, dysuria and hematuria.   Neurological:  Positive for light-headedness.       Positives and Pertinent negatives as per HPI.     SURGICAL HISTORY     Past Surgical History:   Procedure Laterality Date    COLONOSCOPY N/A 12/7/2021    COLONOSCOPY DIAGNOSTIC performed by Victor Hugo García MD at [facility] ENDOSCOPY       CURRENTMEDICATIONS       Previous Medications    ASCORBIC ACID (VITAMIN C) 1000 MG TABLET    Take 1 tablet by mouth daily    ASPIRIN 81 MG TABLET    Take 1 tablet by mouth daily    ATENOLOL (TENORMIN) 100 MG TABLET    TAKE 1 TABLET BY MOUTH EVERY DAY    CETIRIZINE (ZYRTEC) 10 MG TABLET    Take 1 tablet by mouth once daily    CHOLECALCIFEROL (VITAMIN D3) 5000 UNITS TABS    Take by mouth    FLUTICASONE (FLONASE) 50 MCG/ACT NASAL SPRAY    1 spray by Each Nostril route daily    IRBESARTAN (AVAPRO) 150 MG TABLET    Take 1 tablet by mouth daily    LIDOCAINE VISCOUS HCL (XYLOCAINE) 2 % SOLN SOLUTION    Take 15 mLs by mouth as needed for Irritation or Other (to be used to compound - patient has instructions.)    MAGIC MOUTHWASH (MIRACLE MOUTHWASH)    Swish and spit 10 mLs 4 times daily as needed for Irritation Dispense as Magic Mouthwash.  Please add Equal Parts: 60 mL Maalox, 60 mL Viscous Lidocaine, 60 mL Benadryl to 60mL of Carafate.  10 ml swish and spit or swallow as directed above.    MULTIPLE VITAMINS-MINERALS (THERAPEUTIC MULTIVITAMIN-MINERALS) TABLET    Take 1 tablet by mouth daily    TRIAMTERENE-HYDROCHLOROTHIAZIDE 
    DISPOSITION Decision To Admit 04/06/2025 04:46:21 AM   DISPOSITION CONDITION Stable     Blood pressure (!) 146/75, pulse 68, temperature 98.1 °F (36.7 °C), resp. rate 12, height 1.651 m (5' 5\"), weight 89.4 kg (197 lb), SpO2 100%, not currently breastfeeding.    PATIENT REFERRALS  No follow-up provider specified.  DISCHARGE MEDICATIONS  New Prescriptions    No medications on file     DISCONTINUED MEDICATIONS  Discontinued Medications    No medications on file         Note electronically signed by:   Ralph Segovia DO  Attending emergency physician    This chart was generated using the Dragon dictation system.  I created this record, but it may contain dictation errors given the limitations of this technology.       Ralph Segovia,   04/06/25 0559

## 2025-04-06 NOTE — ED NOTES
Patient Name: Kamala Gloria  : 1970 54 y.o.  MRN: [id number]  ED Room #: ED-0019/19     Chief complaint:   Chief Complaint   Patient presents with    Dizziness     Pt came in from home, pt reports dizziness and abd pain that started 25 minutes ago.      Hospital Problem/Diagnosis:       O2 Flow Rate:O2 Device: None (Room air)   (if applicable)  Cardiac Rhythm:   (if applicable)  Active LDA's:   Peripheral IV 25 Right Antecubital (Active)            How does patient ambulate? Low Fall Risk (Ambulates by themselves without support    2. How does patient take pills? Whole with Water    3. Is patient alert? Alert    4. Is patient oriented? To Person, To Place, To Time, and To Situation    5.   Patient arrived from:  home  Facility Name: ___________________________________________    6. If patient is disoriented or from a Skill Nursing Facility has family been notified of admission? No    7. Patient belongings? Cell phone and clothes    Disposition of belongings? Kept with Patient     8. Any specific patient or family belongings/needs/dynamics?   a. none    9. Miscellaneous comments/pending orders?  a. none      If there are any additional questions please reach out to the Emergency Department.

## 2025-04-06 NOTE — H&P
Results   Component Value Date/Time    WBC 3.7 04/06/2025 02:51 AM    RBC 3.59 04/06/2025 02:51 AM    HGB 10.3 04/06/2025 02:51 AM    HCT 30.6 04/06/2025 02:51 AM    MCV 85.4 04/06/2025 02:51 AM    MCH 28.6 04/06/2025 02:51 AM    MCHC 33.5 04/06/2025 02:51 AM    RDW 12.7 04/06/2025 02:51 AM     04/06/2025 02:51 AM    MPV 7.3 04/06/2025 02:51 AM     BMP:    Lab Results   Component Value Date/Time     04/06/2025 03:57 AM    K 3.4 04/06/2025 03:57 AM    K 3.2 04/06/2025 02:51 AM    CL 88 04/06/2025 03:57 AM    CO2 23 04/06/2025 03:57 AM    BUN 9 04/06/2025 03:57 AM    CREATININE 0.9 04/06/2025 03:57 AM    CALCIUM 9.8 04/06/2025 03:57 AM    GFRAA >60 11/23/2021 10:44 AM    GFRAA >60 05/08/2013 09:25 AM    LABGLOM 76 04/06/2025 03:57 AM    LABGLOM >60 10/12/2023 11:59 AM    GLUCOSE 88 04/06/2025 03:57 AM     No orders to display           Problem List  Principal Problem:    Hyponatremia  Resolved Problems:    * No resolved hospital problems. *        Assessment/Plan:   #HypoNa  Pre renal. Suspect 2/2 renal loss and poor intake  Na 122 in ER, got 1 L iv fluid, cont NS 50 /hr after repeating BMP  Ur Na, osm. Target Na < 130 by tomorrrow am    HTN  Hold thiazide, cont others    #Obesity  Has lost sig about of  wt lately .      DVT prophylaxis heparin  Code status Full  Diet regular  Iv access:peripheral  House: no    Admit as inpatient  I anticipate hospitalization spanning more than two midnights for investigation and treatment of the above medically necessary diagnoses.    Please note that some part of this chart was generated using Dragon dictation software. Although every effort was made to ensure the accuracy of this automated transcription, some errors in transcription may have occurred inadvertently. If you may need any clarification, please do not hesitate to contact me through EPIC.       Roscoe Larsen MD    4/6/2025 8:27 AM

## 2025-04-07 VITALS
DIASTOLIC BLOOD PRESSURE: 63 MMHG | SYSTOLIC BLOOD PRESSURE: 111 MMHG | HEIGHT: 65 IN | HEART RATE: 60 BPM | TEMPERATURE: 97.7 F | RESPIRATION RATE: 16 BRPM | WEIGHT: 200.25 LBS | OXYGEN SATURATION: 100 % | BODY MASS INDEX: 33.36 KG/M2

## 2025-04-07 LAB
ANION GAP SERPL CALCULATED.3IONS-SCNC: 10 MMOL/L (ref 3–16)
ANION GAP SERPL CALCULATED.3IONS-SCNC: 8 MMOL/L (ref 3–16)
BASOPHILS # BLD: 0 K/UL (ref 0–0.2)
BASOPHILS NFR BLD: 0.5 %
BUN SERPL-MCNC: 15 MG/DL (ref 7–20)
BUN SERPL-MCNC: 17 MG/DL (ref 7–20)
CALCIUM SERPL-MCNC: 9 MG/DL (ref 8.3–10.6)
CALCIUM SERPL-MCNC: 9.4 MG/DL (ref 8.3–10.6)
CHLORIDE SERPL-SCNC: 97 MMOL/L (ref 99–110)
CHLORIDE SERPL-SCNC: 97 MMOL/L (ref 99–110)
CO2 SERPL-SCNC: 22 MMOL/L (ref 21–32)
CO2 SERPL-SCNC: 23 MMOL/L (ref 21–32)
CREAT SERPL-MCNC: 0.8 MG/DL (ref 0.6–1.1)
CREAT SERPL-MCNC: 0.8 MG/DL (ref 0.6–1.1)
DEPRECATED RDW RBC AUTO: 13 % (ref 12.4–15.4)
EOSINOPHIL # BLD: 0 K/UL (ref 0–0.6)
EOSINOPHIL NFR BLD: 0.6 %
GFR SERPLBLD CREATININE-BSD FMLA CKD-EPI: 88 ML/MIN/{1.73_M2}
GFR SERPLBLD CREATININE-BSD FMLA CKD-EPI: 88 ML/MIN/{1.73_M2}
GLUCOSE SERPL-MCNC: 100 MG/DL (ref 70–99)
GLUCOSE SERPL-MCNC: 93 MG/DL (ref 70–99)
HCT VFR BLD AUTO: 27.7 % (ref 36–48)
HGB BLD-MCNC: 9.4 G/DL (ref 12–16)
LYMPHOCYTES # BLD: 2 K/UL (ref 1–5.1)
LYMPHOCYTES NFR BLD: 55.8 %
MCH RBC QN AUTO: 28.8 PG (ref 26–34)
MCHC RBC AUTO-ENTMCNC: 33.9 G/DL (ref 31–36)
MCV RBC AUTO: 84.8 FL (ref 80–100)
MONOCYTES # BLD: 0.4 K/UL (ref 0–1.3)
MONOCYTES NFR BLD: 12 %
NEUTROPHILS # BLD: 1.1 K/UL (ref 1.7–7.7)
NEUTROPHILS NFR BLD: 31.1 %
PLATELET # BLD AUTO: 260 K/UL (ref 135–450)
PMV BLD AUTO: 7.5 FL (ref 5–10.5)
POTASSIUM SERPL-SCNC: 3.7 MMOL/L (ref 3.5–5.1)
POTASSIUM SERPL-SCNC: 4.4 MMOL/L (ref 3.5–5.1)
RBC # BLD AUTO: 3.27 M/UL (ref 4–5.2)
SODIUM SERPL-SCNC: 127 MMOL/L (ref 136–145)
SODIUM SERPL-SCNC: 130 MMOL/L (ref 136–145)
TSH SERPL DL<=0.005 MIU/L-ACNC: 1.96 UIU/ML (ref 0.27–4.2)
WBC # BLD AUTO: 3.6 K/UL (ref 4–11)

## 2025-04-07 PROCEDURE — 80048 BASIC METABOLIC PNL TOTAL CA: CPT

## 2025-04-07 PROCEDURE — G0378 HOSPITAL OBSERVATION PER HR: HCPCS

## 2025-04-07 PROCEDURE — 84443 ASSAY THYROID STIM HORMONE: CPT

## 2025-04-07 PROCEDURE — 6360000002 HC RX W HCPCS: Performed by: INTERNAL MEDICINE

## 2025-04-07 PROCEDURE — 99232 SBSQ HOSP IP/OBS MODERATE 35: CPT | Performed by: HOSPITALIST

## 2025-04-07 PROCEDURE — 36415 COLL VENOUS BLD VENIPUNCTURE: CPT

## 2025-04-07 PROCEDURE — 6370000000 HC RX 637 (ALT 250 FOR IP): Performed by: STUDENT IN AN ORGANIZED HEALTH CARE EDUCATION/TRAINING PROGRAM

## 2025-04-07 PROCEDURE — 96372 THER/PROPH/DIAG INJ SC/IM: CPT

## 2025-04-07 PROCEDURE — 96361 HYDRATE IV INFUSION ADD-ON: CPT

## 2025-04-07 PROCEDURE — 85025 COMPLETE CBC W/AUTO DIFF WBC: CPT

## 2025-04-07 PROCEDURE — 6370000000 HC RX 637 (ALT 250 FOR IP): Performed by: INTERNAL MEDICINE

## 2025-04-07 RX ORDER — FLUTICASONE PROPIONATE 50 MCG
1 SPRAY, SUSPENSION (ML) NASAL DAILY PRN
Status: DISCONTINUED | OUTPATIENT
Start: 2025-04-07 | End: 2025-04-07 | Stop reason: HOSPADM

## 2025-04-07 RX ADMIN — ASPIRIN 81 MG: 81 TABLET, CHEWABLE ORAL at 09:58

## 2025-04-07 RX ADMIN — DESMOPRESSIN ACETATE 1 MCG: 4 INJECTION, SOLUTION INTRAVENOUS; SUBCUTANEOUS at 01:10

## 2025-04-07 RX ADMIN — FLUTICASONE PROPIONATE 1 SPRAY: 50 SPRAY, METERED NASAL at 12:17

## 2025-04-07 RX ADMIN — LOSARTAN POTASSIUM 50 MG: 25 TABLET, FILM COATED ORAL at 09:58

## 2025-04-07 RX ADMIN — CETIRIZINE HYDROCHLORIDE 10 MG: 10 TABLET, FILM COATED ORAL at 10:15

## 2025-04-07 RX ADMIN — ATENOLOL 100 MG: 50 TABLET ORAL at 09:58

## 2025-04-07 NOTE — PROGRESS NOTES
4 Eyes Skin Assessment     NAME:  Kamala Gloria  YOB: 1970  MEDICAL RECORD NUMBER:  [id number]    The patient is being assessed for  Admission    I agree that at least one RN has performed a thorough Head to Toe Skin Assessment on the patient. ALL assessment sites listed below have been assessed.      Areas assessed by both nurses:    Head, Face, Ears, Shoulders, Back, Chest, Arms, Elbows, Hands, Sacrum. Buttock, Coccyx, Ischium, and Legs. Feet and Heels        Does the Patient have a Wound? No noted wound(s)       Kofi Prevention initiated by RN: No  Wound Care Orders initiated by RN: No    Pressure Injury (Stage 3,4, Unstageable, DTI, NWPT, and Complex wounds) if present, place Wound referral order by RN under : No    New Ostomies, if present place, Ostomy referral order under : No     Nurse 1 eSignature: Electronically signed by Magali Gutiérrez RN on 4/6/25 at 10:59 AM EDT    **SHARE this note so that the co-signing nurse can place an eSignature**    Nurse 2 eSignature: Electronically signed by Jenn Oliveira RN on 4/6/25 at 11:00 AM EDT   
Admission assessment complete. VSS. Pt is A&O X4. She denies pain at this time. Patient clean and dry, ambulates independently in room. Tele monitor on. Breakfast tray set up. Bed side table, call light and belongings within reach. Bed locked and in lowest position. All questions and concerns addressed, no further needs at this time.     
Consult received 
Physical Therapy/Occupational Therapy  Received order for PT/OT.  Upon presentation to room Pt. Was up and ambulating in the bathroom, dressed in regular clothes.  Reports being independent and did not feel the need for PT/OT, therapists agreed.  Will d/c from PT/OT.  Reorder if anything changes.  Thank You.  Shazia Abdul, PT, [id number]    
Pt is at goal now 127   Start free water restriction      Elias Nelson MD   
Shift assessment completed, see flowsheets.  Medications administered, see MAR. Vital signs stable. Plan of care discussed with patient. Safety precautions in place. Call light and bedside table within reach.  Pt denies further needs at this time.  Will continue to monitor.  Amina Walton RN     
    Ryland Nava MD   [facility]  Office : [phone] or through [employer]  Fax :[phone]

## 2025-04-07 NOTE — FLOWSHEET NOTE
Discharge instructions and paperwork given to patient. Patient verbalized understanding and denied further questions at this time. IV removed. All belongings sent with patient. Patient to lobby for pickup.

## 2025-04-07 NOTE — CONSULTS
Nephrology Consult Note                                                                                                                                                                                                                                                                                                                                                               Office : [phone]     Fax :[phone]    Patient's Name: Kamala Gloria    4/6/2025    Reason for Consult:  Hyponatremia   Requesting Physician:  Emma Isbell MD  Chief Complaint:    Chief Complaint   Patient presents with    Dizziness     Pt came in from home, pt reports dizziness and abd pain that started 25 minutes ago.        Assessment/Plan     # Hyponatremia   - pt was drinking lot of fluids and not eating much solute   - Na over - correction noted   - D5 x 1 L   - DDAVP   - Ur studies - polydipsia     - NO thiazide     # HTN   - cont meds   - hold thiazide    # Acid- base/ Electrolyte imbalance   - Monitor     History of Present Ilness:    Kamala Gloria is a 54 y.o. female who presented with dizziness that started around 1 am  when she was  ready to go to bed and persisted and she came  to ER. Was feeling okay all day before that. Also had mild abd discomfort  but  now that's resolved.  Her diet is inconsistent due to being  busy ,she is on BP meds including  thiazide diuretics  . She also takes some OTC' detox meds    Interval hx     Past Medical History:   Diagnosis Date    Heart murmur     Hypertension     Morbidly obese     Nephrolithiasis     Postmenopausal     LMP at age 43       Past Surgical History:   Procedure Laterality Date    COLONOSCOPY N/A 12/7/2021    COLONOSCOPY DIAGNOSTIC performed by Victor Hugo García MD at [facility] ENDOSCOPY       Family History   Problem Relation Age of Onset    High Blood Pressure Mother     High Cholesterol Mother     Cancer Mother         uterine    High Blood Pressure Father     High

## 2025-04-08 ENCOUNTER — CARE COORDINATION (OUTPATIENT)
Dept: CASE MANAGEMENT | Age: 55
End: 2025-04-08

## 2025-04-08 NOTE — CARE COORDINATION
Care Transitions Note    Initial Call - Call within 2 business days of discharge: Yes    Attempted to reach patient for transitions of care follow up. Unable to reach patient.    Outreach Attempts:   HIPAA compliant voicemail left for patient.     Patient: Kamala Gloria    Patient : 1970   MRN: [id number]    Reason for Admission:   Discharge Date: 25  RURS: Readmission Risk Score: 8    Last Discharge Facility       Date Complaint Diagnosis Description Type Department Provider    25 Dizziness Hyponatremia ... ED to Hosp-Admission (Discharged) (ADMITTED) Roscoe Munroe MD; Dale Segovia...            Was this an external facility discharge? No    Follow Up Appointment:   Patient has hospital follow up appointment scheduled within 14 days of discharge.    Future Appointments         Provider Specialty Dept Phone    2025 11:15 AM Emma Isbell MD Internal Medicine [phone]    2025 12:00 PM (Arrive by 11:45 AM) [facility] MAMMO  2 Radiology [phone]    2025 11:00 AM Emma Isbell MD Internal Medicine [phone]    2025 2:00 PM Emma Isbell MD Internal Medicine [phone]            Plan for follow-up on next business day.      Alec Reid RN

## 2025-04-09 ENCOUNTER — CARE COORDINATION (OUTPATIENT)
Dept: CASE MANAGEMENT | Age: 55
End: 2025-04-09

## 2025-04-09 ENCOUNTER — TELEPHONE (OUTPATIENT)
Dept: INTERNAL MEDICINE CLINIC | Age: 55
End: 2025-04-09

## 2025-04-09 DIAGNOSIS — E87.1 HYPONATREMIA: Primary | ICD-10-CM

## 2025-04-09 NOTE — CARE COORDINATION
not on file; education provided -   .    Medication Reconciliation:  Medication reconciliation was performed with patient,[facility]     Remote Patient Monitoring:  Offered patient enrollment in the Remote Patient Monitoring (RPM) program for in-home monitoring: Patient is not eligible for RPM program because: insurance coverage.    Assessments:  Care Transitions 24 Hour Call    Do you have a copy of your discharge instructions?: Yes  Do you have all of your prescriptions and are they filled?: Yes  Have you been contacted by a [facility] Pharmacist?: No  Have you scheduled your follow up appointment?: No  Do you have support at home?: Alone  Do you feel like you have everything you need to keep you well at home?: Yes  Are you an active caregiver in your home?: No  Care Transitions Interventions  No Identified Needs          Follow Up Appointment:   Discussed follow up appointments. Patient has hospital follow up appointment scheduled within 14 days of discharge.   Future Appointments         Provider Specialty Dept Phone    4/18/2025 11:15 AM Emma Isbell MD Internal Medicine [phone]    4/24/2025 12:00 PM (Arrive by 11:45 AM[facility] [phone]    5/20/2025 11:00 AM Emma Isbell MD Internal Medicine [phone]    8/18/2025 2:00 PM Emma Isbell MD Internal Medicine [phone]            Care Transition Nurse provided contact information.  Plan for follow-up call in 6-10 days based on severity of symptoms and risk factors.  Plan for next call: self management-   follow-up appointment-       Alec Reid RN

## 2025-04-09 NOTE — DISCHARGE SUMMARY
Final   04/06/2025 30.6 (L) 36.0 - 48.0 % Final   03/17/2023 33.5 (L) 36.0 - 48.0 % Final     Platelets   Date Value Ref Range Status   04/07/2025 260 135 - 450 K/uL Final   04/06/2025 291 135 - 450 K/uL Final   03/17/2023 399 135 - 450 K/uL Final     Sodium   Date Value Ref Range Status   04/07/2025 130 (L) 136 - 145 mmol/L Final   04/07/2025 127 (L) 136 - 145 mmol/L Final   04/06/2025 133 (L) 136 - 145 mmol/L Final     Potassium   Date Value Ref Range Status   04/07/2025 4.4 3.5 - 5.1 mmol/L Final   04/06/2025 4.0 3.5 - 5.1 mmol/L Final   04/06/2025 4.2 3.5 - 5.1 mmol/L Final     Potassium reflex Magnesium   Date Value Ref Range Status   04/07/2025 3.7 3.5 - 5.1 mmol/L Final   04/06/2025 3.2 (L) 3.5 - 5.1 mmol/L Final     Chloride   Date Value Ref Range Status   04/07/2025 97 (L) 99 - 110 mmol/L Final   04/07/2025 97 (L) 99 - 110 mmol/L Final   04/06/2025 100 99 - 110 mmol/L Final     CO2   Date Value Ref Range Status   04/07/2025 23 21 - 32 mmol/L Final   04/07/2025 22 21 - 32 mmol/L Final   04/06/2025 24 21 - 32 mmol/L Final     BUN   Date Value Ref Range Status   04/07/2025 15 7 - 20 mg/dL Final   04/07/2025 17 7 - 20 mg/dL Final   04/06/2025 15 7 - 20 mg/dL Final     Creatinine   Date Value Ref Range Status   04/07/2025 0.8 0.6 - 1.1 mg/dL Final   04/07/2025 0.8 0.6 - 1.1 mg/dL Final   04/06/2025 0.9 0.6 - 1.1 mg/dL Final     Calcium   Date Value Ref Range Status   04/07/2025 9.4 8.3 - 10.6 mg/dL Final   04/07/2025 9.0 8.3 - 10.6 mg/dL Final   04/06/2025 9.4 8.3 - 10.6 mg/dL Final     Phosphorus   Date Value Ref Range Status   10/30/2024 3.7 2.5 - 4.9 mg/dL Final     No results for input(s): \"AST\", \"ALT\", \"BILIDIR\", \"BILITOT\", \"ALKPHOS\" in the last 72 hours.  No results for input(s): \"INR\" in the last 72 hours.  No results for input(s): \"CKTOTAL\", \"TROPHS\" in the last 72 hours.    Urinalysis:      Lab Results   Component Value Date/Time    NITRU Negative 04/06/2025 05:15 AM    WBCUA 48 02/03/2016 09:10 PM

## 2025-04-16 ENCOUNTER — CARE COORDINATION (OUTPATIENT)
Dept: CASE MANAGEMENT | Age: 55
End: 2025-04-16

## 2025-04-16 ENCOUNTER — OFFICE VISIT (OUTPATIENT)
Dept: INTERNAL MEDICINE CLINIC | Age: 55
End: 2025-04-16
Payer: MEDICAID

## 2025-04-16 VITALS
OXYGEN SATURATION: 98 % | DIASTOLIC BLOOD PRESSURE: 84 MMHG | SYSTOLIC BLOOD PRESSURE: 138 MMHG | BODY MASS INDEX: 33.91 KG/M2 | HEART RATE: 63 BPM | TEMPERATURE: 98.2 F | WEIGHT: 203.8 LBS

## 2025-04-16 DIAGNOSIS — I10 HTN, GOAL BELOW 140/90: ICD-10-CM

## 2025-04-16 DIAGNOSIS — Z23 NEED FOR PNEUMOCOCCAL VACCINATION: Primary | ICD-10-CM

## 2025-04-16 DIAGNOSIS — E87.1 HYPONATREMIA: ICD-10-CM

## 2025-04-16 LAB
ALBUMIN SERPL-MCNC: 4.1 G/DL (ref 3.4–5)
ANION GAP SERPL CALCULATED.3IONS-SCNC: 8 MMOL/L (ref 3–16)
BUN SERPL-MCNC: 16 MG/DL (ref 7–20)
CALCIUM SERPL-MCNC: 9.8 MG/DL (ref 8.3–10.6)
CHLORIDE SERPL-SCNC: 107 MMOL/L (ref 99–110)
CO2 SERPL-SCNC: 27 MMOL/L (ref 21–32)
CREAT SERPL-MCNC: 0.9 MG/DL (ref 0.6–1.1)
GFR SERPLBLD CREATININE-BSD FMLA CKD-EPI: 76 ML/MIN/{1.73_M2}
GLUCOSE SERPL-MCNC: 81 MG/DL (ref 70–99)
PHOSPHATE SERPL-MCNC: 3.7 MG/DL (ref 2.5–4.9)
POTASSIUM SERPL-SCNC: 4.4 MMOL/L (ref 3.5–5.1)
SODIUM SERPL-SCNC: 142 MMOL/L (ref 136–145)

## 2025-04-16 PROCEDURE — G8427 DOCREV CUR MEDS BY ELIG CLIN: HCPCS | Performed by: INTERNAL MEDICINE

## 2025-04-16 PROCEDURE — 1111F DSCHRG MED/CURRENT MED MERGE: CPT | Performed by: INTERNAL MEDICINE

## 2025-04-16 PROCEDURE — 99213 OFFICE O/P EST LOW 20 MIN: CPT | Performed by: INTERNAL MEDICINE

## 2025-04-16 PROCEDURE — 3075F SYST BP GE 130 - 139MM HG: CPT | Performed by: INTERNAL MEDICINE

## 2025-04-16 PROCEDURE — 1036F TOBACCO NON-USER: CPT | Performed by: INTERNAL MEDICINE

## 2025-04-16 PROCEDURE — G8417 CALC BMI ABV UP PARAM F/U: HCPCS | Performed by: INTERNAL MEDICINE

## 2025-04-16 PROCEDURE — 3017F COLORECTAL CA SCREEN DOC REV: CPT | Performed by: INTERNAL MEDICINE

## 2025-04-16 PROCEDURE — 3079F DIAST BP 80-89 MM HG: CPT | Performed by: INTERNAL MEDICINE

## 2025-04-16 RX ORDER — IRBESARTAN 150 MG/1
150 TABLET ORAL DAILY
Qty: 90 TABLET | Refills: 1 | Status: SHIPPED | OUTPATIENT
Start: 2025-04-16

## 2025-04-16 RX ORDER — ATENOLOL 100 MG/1
100 TABLET ORAL DAILY
Qty: 30 TABLET | Refills: 0 | Status: SHIPPED | OUTPATIENT
Start: 2025-04-16

## 2025-04-16 SDOH — ECONOMIC STABILITY: FOOD INSECURITY: WITHIN THE PAST 12 MONTHS, THE FOOD YOU BOUGHT JUST DIDN'T LAST AND YOU DIDN'T HAVE MONEY TO GET MORE.: NEVER TRUE

## 2025-04-16 SDOH — ECONOMIC STABILITY: FOOD INSECURITY: WITHIN THE PAST 12 MONTHS, YOU WORRIED THAT YOUR FOOD WOULD RUN OUT BEFORE YOU GOT MONEY TO BUY MORE.: NEVER TRUE

## 2025-04-16 ASSESSMENT — PATIENT HEALTH QUESTIONNAIRE - PHQ9
SUM OF ALL RESPONSES TO PHQ QUESTIONS 1-9: 0
2. FEELING DOWN, DEPRESSED OR HOPELESS: NOT AT ALL
SUM OF ALL RESPONSES TO PHQ QUESTIONS 1-9: 0
1. LITTLE INTEREST OR PLEASURE IN DOING THINGS: NOT AT ALL

## 2025-04-16 NOTE — CARE COORDINATION
Care Transitions Note    Follow Up Call     Attempted to reach patient for transitions of care follow up.  Unable to reach patient.      Outreach Attempts:   HIPAA compliant voicemail left for patient.     Care Summary Note:     Follow Up Appointment:   Future Appointments         Provider Specialty Dept Phone    4/24/2025 12:00 PM (Arrive by 11:45 AM) [facility] MAMMO RM 2 Radiology [phone]    5/20/2025 11:00 AM Emma Isbell MD Internal Medicine [phone]    8/18/2025 2:00 PM Emma Isbell MD Internal Medicine [phone]            Plan for follow-up call in 2-5 days based on severity of symptoms and risk factors. Plan for next call: self management-     Alec Reid RN

## 2025-04-16 NOTE — PROGRESS NOTES
person, place, and time.   Psychiatric:         Behavior: Behavior normal.         Thought Content: Thought content normal.         Judgment: Judgment normal.                  An electronic signature was used to authenticate this note.    --LISET PICKETT MD

## 2025-04-16 NOTE — PATIENT INSTRUCTIONS
[employer] [facility]   Speak to a trained professional 24/7 who can connect you to essential community services including food, clothing, transportation, housing, utilities, employment services, childcare, and baby supplies. 211 serves nationwide.  [address] [place] [address].org for resources in [place], [facility], [place] and [facility] in [place]; [employer], [place], [place], and [facility] in [place].   [email or web address]/resources for resources in [place], [place], [place], [place], [place], [place], [place], [place], [facility], [place], [place], and [facility] in [place].    Non-Emergency Transportation: Non-emergency medical transportation is for Medicaid members who do not have access to free transportation that suits their medical needs and need to be transported to a Medicaid-covered service performed by a Medicaid enrolled provider.   [facility]: [phone]  [facility]: [phone]  [facility]: [phone]  [facility]: [phone]  [facility]: [phone]  [facility]: [phone]. [phone]  [facility]: [phone]   [facility]: [employer], [place], [place], [place], [place], [place], and [facility] [phone]  [place]: [phone] for non-managed Medicaid.     Public Transportation Services: Small fee may be associated with service.  [facility]: [phone]  [place] [facility]: [phone]  [facility]: [phone], ext. [phone]  [facility]: [phone]  [facility] Transit Services: [phone]  [facility] Transportation: [phone]  [place]- [facility] ([employer]): [phone]      [facility]   What they offer: Transportation Services also offers convenient group shopping trips. Seniors can socialize with their friends as they shop and complete other errands.  Website:  [email or web address]   Phone

## 2025-04-18 ENCOUNTER — CARE COORDINATION (OUTPATIENT)
Dept: CASE MANAGEMENT | Age: 55
End: 2025-04-18

## 2025-04-18 NOTE — CARE COORDINATION
Care Transitions Note    Follow Up Call     Patient Current Location:  Home: [id number]  [place] [address]    Care Transition Nurse contacted the patient by telephone. Verified name and  as identifiers.    Additional needs identified to be addressed with provider   No needs identified                 Method of communication with provider: none.    Care Summary Note: Pt states doing well, no issues or concerns. Agreed to more CTC f/u calls.    Advance Care Planning:   Does patient have an Advance Directive: reviewed during previous call, see note. .    Medication Review:  No changes since last call.     Remote Patient Monitoring:  Offered patient enrollment in the Remote Patient Monitoring (RPM) program for in-home monitoring: future call.    Assessments:  Care Transitions Subsequent and Final Call    Subsequent and Final Calls  Do you have any ongoing symptoms?: No  Have your medications changed?: No  Do you have any questions related to your medications?: No  Do you currently have any active services?: No  Do you have any needs or concerns that I can assist you with?: No  Identified Barriers: None  Care Transitions Interventions  No Identified Needs  Other Interventions:              Follow Up Appointment:   Reviewed upcoming appointment(s).  Future Appointments         Provider Specialty Dept Phone    2025 12:00 PM (Arrive by 11:45 AM) [facility] RM 2 Radiology [phone]    2025 11:00 AM Emma Isbell MD Internal Medicine [phone]    2025 2:00 PM Emma Isbell MD Internal Medicine [phone]            Care Transition Nurse provided contact information.  Plan for follow-up call in 6-10 days based on severity of symptoms and risk factors.  Plan for next call: self management-       Alec Reid RN

## 2025-04-24 ENCOUNTER — HOSPITAL ENCOUNTER (OUTPATIENT)
Dept: WOMENS IMAGING | Age: 55
Discharge: HOME | End: 2025-04-24
Payer: MEDICAID

## 2025-04-24 ENCOUNTER — CARE COORDINATION (OUTPATIENT)
Dept: CASE MANAGEMENT | Age: 55
End: 2025-04-24

## 2025-04-24 VITALS — BODY MASS INDEX: 33.82 KG/M2 | WEIGHT: 203 LBS | HEIGHT: 65 IN

## 2025-04-24 DIAGNOSIS — Z12.31 BREAST CANCER SCREENING BY MAMMOGRAM: ICD-10-CM

## 2025-04-24 PROCEDURE — 77067 SCR MAMMO BI INCL CAD: CPT

## 2025-04-24 NOTE — CARE COORDINATION
Care Transitions Note    Follow Up Call     Attempted to reach patient for transitions of care follow up.  Unable to reach patient.      Outreach Attempts:   HIPAA compliant voicemail left for patient.     Care Summary Note:     Follow Up Appointment:   Future Appointments         Provider Specialty Dept Phone    5/20/2025 11:00 AM Emma Isbell MD Internal Medicine [phone]    6/2/2025 1:45 PM Ryland Nava MD Nephrology [phone]    8/18/2025 2:00 PM Emma Isbell MD Internal Medicine [phone]            Plan for follow-up call in 2-5 days based on severity of symptoms and risk factors. Plan for next call: self management-     Alec Reid RN

## 2025-04-28 ENCOUNTER — CARE COORDINATION (OUTPATIENT)
Dept: CASE MANAGEMENT | Age: 55
End: 2025-04-28

## 2025-04-28 NOTE — CARE COORDINATION
Care Transitions Note    Final Call     Patient Current Location:  Home: [id number]  [place] [address]    Care Transition Nurse contacted the patient by telephone. Verified name and  as identifiers.    Patient graduated from the Care Transitions program on 25.  Patient/family verbalizes confidence in the ability to self-manage at this time..      Advance Care Planning:   Does patient have an Advance Directive: reviewed during previous call, see note. .    Handoff:   Patient was not referred to the ACM team due to no additional needs identified.       Care Summary Note: Pt states doing well, no issues or concerns. No need for further f/u CTC calls per pt.        Assessments:  Care Transitions Subsequent and Final Call    Subsequent and Final Calls  Do you have any ongoing symptoms?: No  Have your medications changed?: No  Do you have any questions related to your medications?: No  Do you currently have any active services?: No  Do you have any needs or concerns that I can assist you with?: No  Identified Barriers: None  Care Transitions Interventions  No Identified Needs  Other Interventions:              Upcoming Appointments:    Future Appointments         Provider Specialty Dept Phone    2025 11:00 AM Emma Isbell MD Internal Medicine [phone]    2025 1:45 PM Ryland Nava MD Nephrology [phone]    2025 2:00 PM Emma Isbell MD Internal Medicine [phone]            Patient has agreed to contact primary care provider and/or specialist for any further questions, concerns, or needs.    Alec Reid RN

## 2025-05-01 ENCOUNTER — RESULTS FOLLOW-UP (OUTPATIENT)
Dept: INTERNAL MEDICINE CLINIC | Age: 55
End: 2025-05-01

## 2025-05-01 ASSESSMENT — ENCOUNTER SYMPTOMS
ALLERGIC/IMMUNOLOGIC NEGATIVE: 1
EYES NEGATIVE: 1
GASTROINTESTINAL NEGATIVE: 1

## 2025-06-03 DIAGNOSIS — J30.9 ALLERGIC RHINITIS, UNSPECIFIED SEASONALITY, UNSPECIFIED TRIGGER: ICD-10-CM

## 2025-06-03 DIAGNOSIS — I10 HTN, GOAL BELOW 140/90: ICD-10-CM

## 2025-06-03 NOTE — TELEPHONE ENCOUNTER
Recent Visits  Date Type Provider Dept   04/16/25 Office Visit Emma Isbell MD [facility]   08/13/24 Office Visit Emma Isbell MD [facility]   02/13/24 Office Visit Emma Isbell MD [facility]   Showing recent visits within past 540 days with a meds authorizing provider and meeting all other requirements  Future Appointments  Date Type Provider Dept   08/13/25 Appointment Emma Isbell MD [facility]   Showing future appointments within next 150 days with a meds authorizing provider and meeting all other requirements     4/16/2025

## 2025-06-06 RX ORDER — CETIRIZINE HYDROCHLORIDE 10 MG/1
TABLET ORAL
Qty: 90 TABLET | Refills: 1 | Status: SHIPPED | OUTPATIENT
Start: 2025-06-06

## 2025-06-06 RX ORDER — ATENOLOL 100 MG/1
100 TABLET ORAL DAILY
Qty: 30 TABLET | Refills: 0 | Status: SHIPPED | OUTPATIENT
Start: 2025-06-06

## 2025-07-01 DIAGNOSIS — I10 HTN, GOAL BELOW 140/90: ICD-10-CM

## 2025-07-01 RX ORDER — ATENOLOL 100 MG/1
100 TABLET ORAL DAILY
Qty: 30 TABLET | Refills: 0 | Status: SHIPPED | OUTPATIENT
Start: 2025-07-01

## 2025-07-01 NOTE — TELEPHONE ENCOUNTER
Recent Visits  Date Type Provider Dept   04/16/25 Office Visit Emma Isbell MD [facility]   08/13/24 Office Visit Emma Isbell MD [facility]   02/13/24 Office Visit Emma Isbell MD [facility]   Showing recent visits within past 540 days with a meds authorizing provider and meeting all other requirements  Future Appointments  Date Type Provider Dept   08/13/25 Appointment Emma Isbell MD [facility]   Showing future appointments within next 150 days with a meds authorizing provider and meeting all other requirements     4/16/2025

## 2025-08-01 ENCOUNTER — PATIENT MESSAGE (OUTPATIENT)
Dept: INTERNAL MEDICINE CLINIC | Age: 55
End: 2025-08-01

## 2025-08-02 ENCOUNTER — APPOINTMENT (OUTPATIENT)
Dept: CT IMAGING | Age: 55
End: 2025-08-02
Payer: MEDICAID

## 2025-08-02 ENCOUNTER — APPOINTMENT (OUTPATIENT)
Dept: GENERAL RADIOLOGY | Age: 55
End: 2025-08-02
Payer: MEDICAID

## 2025-08-02 ENCOUNTER — HOSPITAL ENCOUNTER (OUTPATIENT)
Age: 55
Setting detail: OBSERVATION
Discharge: HOME | End: 2025-08-04
Attending: EMERGENCY MEDICINE | Admitting: INTERNAL MEDICINE
Payer: MEDICAID

## 2025-08-02 DIAGNOSIS — R07.9 CHEST PAIN, UNSPECIFIED TYPE: Primary | ICD-10-CM

## 2025-08-02 DIAGNOSIS — I10 UNCONTROLLED HYPERTENSION: ICD-10-CM

## 2025-08-02 DIAGNOSIS — H93.13 BILATERAL TINNITUS: ICD-10-CM

## 2025-08-02 PROBLEM — J32.9 CHRONIC SINUSITIS: Status: ACTIVE | Noted: 2025-08-02

## 2025-08-02 PROBLEM — E66.9 OBESITY (BMI 30-39.9): Status: ACTIVE | Noted: 2025-08-02

## 2025-08-02 LAB
ALBUMIN SERPL-MCNC: 4.1 G/DL (ref 3.4–5)
ALBUMIN/GLOB SERPL: 1.1 {RATIO} (ref 1.1–2.2)
ALP SERPL-CCNC: 95 U/L (ref 40–129)
ALT SERPL-CCNC: 11 U/L (ref 10–40)
ANION GAP SERPL CALCULATED.3IONS-SCNC: 11 MMOL/L (ref 3–16)
AST SERPL-CCNC: 22 U/L (ref 15–37)
B-HCG SERPL EIA 3RD IS-ACNC: <5 MIU/ML
BASOPHILS # BLD: 0 K/UL (ref 0–0.2)
BASOPHILS NFR BLD: 0.8 %
BILIRUB SERPL-MCNC: 0.3 MG/DL (ref 0–1)
BILIRUB UR QL STRIP.AUTO: NEGATIVE
BUN SERPL-MCNC: 16 MG/DL (ref 7–20)
CALCIUM SERPL-MCNC: 10 MG/DL (ref 8.3–10.6)
CHLORIDE SERPL-SCNC: 107 MMOL/L (ref 99–110)
CLARITY UR: CLEAR
CO2 SERPL-SCNC: 24 MMOL/L (ref 21–32)
COLOR UR: YELLOW
CREAT SERPL-MCNC: 1 MG/DL (ref 0.6–1.1)
DEPRECATED RDW RBC AUTO: 12.8 % (ref 12.4–15.4)
EOSINOPHIL # BLD: 0 K/UL (ref 0–0.6)
EOSINOPHIL NFR BLD: 1.3 %
GFR SERPLBLD CREATININE-BSD FMLA CKD-EPI: 67 ML/MIN/{1.73_M2}
GLUCOSE SERPL-MCNC: 97 MG/DL (ref 70–99)
GLUCOSE UR STRIP.AUTO-MCNC: NEGATIVE MG/DL
HCT VFR BLD AUTO: 34.8 % (ref 36–48)
HGB BLD-MCNC: 11.6 G/DL (ref 12–16)
HGB UR QL STRIP.AUTO: NEGATIVE
KETONES UR STRIP.AUTO-MCNC: NEGATIVE MG/DL
LEUKOCYTE ESTERASE UR QL STRIP.AUTO: NEGATIVE
LYMPHOCYTES # BLD: 1 K/UL (ref 1–5.1)
LYMPHOCYTES NFR BLD: 31.7 %
MAGNESIUM SERPL-MCNC: 1.87 MG/DL (ref 1.8–2.4)
MCH RBC QN AUTO: 28.6 PG (ref 26–34)
MCHC RBC AUTO-ENTMCNC: 33.4 G/DL (ref 31–36)
MCV RBC AUTO: 85.7 FL (ref 80–100)
MONOCYTES # BLD: 0.3 K/UL (ref 0–1.3)
MONOCYTES NFR BLD: 10 %
NEUTROPHILS # BLD: 1.8 K/UL (ref 1.7–7.7)
NEUTROPHILS NFR BLD: 56.2 %
NITRITE UR QL STRIP.AUTO: NEGATIVE
PH UR STRIP.AUTO: 8 [PH] (ref 5–8)
PLATELET # BLD AUTO: 274 K/UL (ref 135–450)
PMV BLD AUTO: 7.6 FL (ref 5–10.5)
POTASSIUM SERPL-SCNC: 3.5 MMOL/L (ref 3.5–5.1)
PROT SERPL-MCNC: 7.7 G/DL (ref 6.4–8.2)
PROT UR STRIP.AUTO-MCNC: NEGATIVE MG/DL
RBC # BLD AUTO: 4.06 M/UL (ref 4–5.2)
SODIUM SERPL-SCNC: 142 MMOL/L (ref 136–145)
SP GR UR STRIP.AUTO: >=1.03 (ref 1–1.03)
TROPONIN, HIGH SENSITIVITY: 11 NG/L (ref 0–14)
TROPONIN, HIGH SENSITIVITY: 13 NG/L (ref 0–14)
TROPONIN, HIGH SENSITIVITY: 9 NG/L (ref 0–14)
UA COMPLETE W REFLEX CULTURE PNL UR: NORMAL
UA DIPSTICK W REFLEX MICRO PNL UR: NORMAL
URN SPEC COLLECT METH UR: NORMAL
UROBILINOGEN UR STRIP-ACNC: 0.2 E.U./DL
WBC # BLD AUTO: 3.2 K/UL (ref 4–11)

## 2025-08-02 PROCEDURE — 6370000000 HC RX 637 (ALT 250 FOR IP): Performed by: EMERGENCY MEDICINE

## 2025-08-02 PROCEDURE — 6360000004 HC RX CONTRAST MEDICATION: Performed by: INTERNAL MEDICINE

## 2025-08-02 PROCEDURE — G0378 HOSPITAL OBSERVATION PER HR: HCPCS

## 2025-08-02 PROCEDURE — 99285 EMERGENCY DEPT VISIT HI MDM: CPT

## 2025-08-02 PROCEDURE — 83735 ASSAY OF MAGNESIUM: CPT

## 2025-08-02 PROCEDURE — 84484 ASSAY OF TROPONIN QUANT: CPT

## 2025-08-02 PROCEDURE — 81003 URINALYSIS AUTO W/O SCOPE: CPT

## 2025-08-02 PROCEDURE — 85025 COMPLETE CBC W/AUTO DIFF WBC: CPT

## 2025-08-02 PROCEDURE — 36415 COLL VENOUS BLD VENIPUNCTURE: CPT

## 2025-08-02 PROCEDURE — 2500000003 HC RX 250 WO HCPCS: Performed by: INTERNAL MEDICINE

## 2025-08-02 PROCEDURE — 96374 THER/PROPH/DIAG INJ IV PUSH: CPT

## 2025-08-02 PROCEDURE — 83036 HEMOGLOBIN GLYCOSYLATED A1C: CPT

## 2025-08-02 PROCEDURE — 71046 X-RAY EXAM CHEST 2 VIEWS: CPT

## 2025-08-02 PROCEDURE — 70450 CT HEAD/BRAIN W/O DYE: CPT

## 2025-08-02 PROCEDURE — 6360000002 HC RX W HCPCS: Performed by: INTERNAL MEDICINE

## 2025-08-02 PROCEDURE — 84702 CHORIONIC GONADOTROPIN TEST: CPT

## 2025-08-02 PROCEDURE — 96372 THER/PROPH/DIAG INJ SC/IM: CPT

## 2025-08-02 PROCEDURE — 93005 ELECTROCARDIOGRAM TRACING: CPT | Performed by: EMERGENCY MEDICINE

## 2025-08-02 PROCEDURE — 80053 COMPREHEN METABOLIC PANEL: CPT

## 2025-08-02 PROCEDURE — 70498 CT ANGIOGRAPHY NECK: CPT

## 2025-08-02 RX ORDER — HYDRALAZINE HYDROCHLORIDE 20 MG/ML
10 INJECTION INTRAMUSCULAR; INTRAVENOUS EVERY 4 HOURS PRN
Status: DISCONTINUED | OUTPATIENT
Start: 2025-08-02 | End: 2025-08-04 | Stop reason: HOSPADM

## 2025-08-02 RX ORDER — POTASSIUM CHLORIDE 1500 MG/1
40 TABLET, EXTENDED RELEASE ORAL PRN
Status: DISCONTINUED | OUTPATIENT
Start: 2025-08-02 | End: 2025-08-04 | Stop reason: HOSPADM

## 2025-08-02 RX ORDER — IBUPROFEN 600 MG/1
600 TABLET, FILM COATED ORAL ONCE
Status: DISCONTINUED | OUTPATIENT
Start: 2025-08-02 | End: 2025-08-04 | Stop reason: HOSPADM

## 2025-08-02 RX ORDER — ATORVASTATIN CALCIUM 40 MG/1
40 TABLET, FILM COATED ORAL NIGHTLY
Status: DISCONTINUED | OUTPATIENT
Start: 2025-08-02 | End: 2025-08-04 | Stop reason: HOSPADM

## 2025-08-02 RX ORDER — SODIUM CHLORIDE 9 MG/ML
INJECTION, SOLUTION INTRAVENOUS PRN
Status: DISCONTINUED | OUTPATIENT
Start: 2025-08-02 | End: 2025-08-04 | Stop reason: HOSPADM

## 2025-08-02 RX ORDER — SODIUM CHLORIDE 0.9 % (FLUSH) 0.9 %
5-40 SYRINGE (ML) INJECTION EVERY 12 HOURS SCHEDULED
Status: DISCONTINUED | OUTPATIENT
Start: 2025-08-02 | End: 2025-08-04 | Stop reason: HOSPADM

## 2025-08-02 RX ORDER — MAGNESIUM HYDROXIDE/ALUMINUM HYDROXICE/SIMETHICONE 120; 1200; 1200 MG/30ML; MG/30ML; MG/30ML
30 SUSPENSION ORAL EVERY 6 HOURS PRN
Status: DISCONTINUED | OUTPATIENT
Start: 2025-08-02 | End: 2025-08-04 | Stop reason: HOSPADM

## 2025-08-02 RX ORDER — ONDANSETRON 2 MG/ML
4 INJECTION INTRAMUSCULAR; INTRAVENOUS EVERY 6 HOURS PRN
Status: DISCONTINUED | OUTPATIENT
Start: 2025-08-02 | End: 2025-08-04 | Stop reason: HOSPADM

## 2025-08-02 RX ORDER — POTASSIUM CHLORIDE 7.45 MG/ML
10 INJECTION INTRAVENOUS PRN
Status: DISCONTINUED | OUTPATIENT
Start: 2025-08-02 | End: 2025-08-04 | Stop reason: HOSPADM

## 2025-08-02 RX ORDER — FLUTICASONE PROPIONATE 50 MCG
2 SPRAY, SUSPENSION (ML) NASAL DAILY
Status: DISCONTINUED | OUTPATIENT
Start: 2025-08-03 | End: 2025-08-04 | Stop reason: HOSPADM

## 2025-08-02 RX ORDER — ACETAMINOPHEN 650 MG/1
650 SUPPOSITORY RECTAL EVERY 6 HOURS PRN
Status: DISCONTINUED | OUTPATIENT
Start: 2025-08-02 | End: 2025-08-04 | Stop reason: HOSPADM

## 2025-08-02 RX ORDER — SODIUM CHLORIDE 0.9 % (FLUSH) 0.9 %
5-40 SYRINGE (ML) INJECTION PRN
Status: DISCONTINUED | OUTPATIENT
Start: 2025-08-02 | End: 2025-08-04 | Stop reason: HOSPADM

## 2025-08-02 RX ORDER — CETIRIZINE HYDROCHLORIDE 10 MG/1
10 TABLET ORAL DAILY
Status: DISCONTINUED | OUTPATIENT
Start: 2025-08-03 | End: 2025-08-04 | Stop reason: HOSPADM

## 2025-08-02 RX ORDER — POLYETHYLENE GLYCOL 3350 17 G/17G
17 POWDER, FOR SOLUTION ORAL DAILY PRN
Status: DISCONTINUED | OUTPATIENT
Start: 2025-08-02 | End: 2025-08-04 | Stop reason: HOSPADM

## 2025-08-02 RX ORDER — IOPAMIDOL 755 MG/ML
75 INJECTION, SOLUTION INTRAVASCULAR
Status: COMPLETED | OUTPATIENT
Start: 2025-08-02 | End: 2025-08-02

## 2025-08-02 RX ORDER — ASPIRIN 81 MG/1
81 TABLET ORAL DAILY
Status: DISCONTINUED | OUTPATIENT
Start: 2025-08-03 | End: 2025-08-04 | Stop reason: HOSPADM

## 2025-08-02 RX ORDER — ACETAMINOPHEN 325 MG/1
650 TABLET ORAL EVERY 6 HOURS PRN
Status: DISCONTINUED | OUTPATIENT
Start: 2025-08-02 | End: 2025-08-04 | Stop reason: HOSPADM

## 2025-08-02 RX ORDER — ENOXAPARIN SODIUM 100 MG/ML
40 INJECTION SUBCUTANEOUS DAILY
Status: DISCONTINUED | OUTPATIENT
Start: 2025-08-02 | End: 2025-08-04 | Stop reason: HOSPADM

## 2025-08-02 RX ORDER — ASPIRIN 81 MG/1
324 TABLET, CHEWABLE ORAL ONCE
Status: COMPLETED | OUTPATIENT
Start: 2025-08-02 | End: 2025-08-02

## 2025-08-02 RX ORDER — ATENOLOL 50 MG/1
100 TABLET ORAL DAILY
Status: DISCONTINUED | OUTPATIENT
Start: 2025-08-03 | End: 2025-08-04 | Stop reason: HOSPADM

## 2025-08-02 RX ORDER — ASCORBIC ACID 500 MG
1000 TABLET ORAL DAILY
Status: DISCONTINUED | OUTPATIENT
Start: 2025-08-03 | End: 2025-08-04 | Stop reason: HOSPADM

## 2025-08-02 RX ORDER — LOSARTAN POTASSIUM 25 MG/1
50 TABLET ORAL DAILY
Status: DISCONTINUED | OUTPATIENT
Start: 2025-08-02 | End: 2025-08-04 | Stop reason: HOSPADM

## 2025-08-02 RX ORDER — FLUTICASONE PROPIONATE 50 MCG
1 SPRAY, SUSPENSION (ML) NASAL DAILY
Status: DISCONTINUED | OUTPATIENT
Start: 2025-08-02 | End: 2025-08-02

## 2025-08-02 RX ORDER — GUAIFENESIN 600 MG/1
1200 TABLET, EXTENDED RELEASE ORAL 2 TIMES DAILY
Status: DISCONTINUED | OUTPATIENT
Start: 2025-08-02 | End: 2025-08-04 | Stop reason: HOSPADM

## 2025-08-02 RX ORDER — NITROGLYCERIN 0.4 MG/1
0.4 TABLET SUBLINGUAL PRN
Status: DISCONTINUED | OUTPATIENT
Start: 2025-08-02 | End: 2025-08-04 | Stop reason: HOSPADM

## 2025-08-02 RX ORDER — ONDANSETRON 4 MG/1
4 TABLET, ORALLY DISINTEGRATING ORAL EVERY 8 HOURS PRN
Status: DISCONTINUED | OUTPATIENT
Start: 2025-08-02 | End: 2025-08-04 | Stop reason: HOSPADM

## 2025-08-02 RX ORDER — PANTOPRAZOLE SODIUM 40 MG/1
40 TABLET, DELAYED RELEASE ORAL
Status: DISCONTINUED | OUTPATIENT
Start: 2025-08-03 | End: 2025-08-04 | Stop reason: HOSPADM

## 2025-08-02 RX ORDER — SODIUM CHLORIDE 9 MG/ML
INJECTION, SOLUTION INTRAVENOUS CONTINUOUS
Status: ACTIVE | OUTPATIENT
Start: 2025-08-02 | End: 2025-08-03

## 2025-08-02 RX ORDER — MAGNESIUM SULFATE IN WATER 40 MG/ML
2000 INJECTION, SOLUTION INTRAVENOUS PRN
Status: DISCONTINUED | OUTPATIENT
Start: 2025-08-02 | End: 2025-08-04 | Stop reason: HOSPADM

## 2025-08-02 RX ADMIN — SODIUM CHLORIDE, PRESERVATIVE FREE 10 ML: 5 INJECTION INTRAVENOUS at 20:50

## 2025-08-02 RX ADMIN — ENOXAPARIN SODIUM 40 MG: 100 INJECTION SUBCUTANEOUS at 15:00

## 2025-08-02 RX ADMIN — ASPIRIN 324 MG: 81 TABLET, CHEWABLE ORAL at 11:43

## 2025-08-02 RX ADMIN — IOPAMIDOL 75 ML: 755 INJECTION, SOLUTION INTRAVENOUS at 13:48

## 2025-08-02 RX ADMIN — HYDRALAZINE HYDROCHLORIDE 10 MG: 20 INJECTION, SOLUTION INTRAMUSCULAR; INTRAVENOUS at 14:54

## 2025-08-02 ASSESSMENT — HEART SCORE: ECG: NON-SPECIFC REPOLARIZATION DISTURBANCE/LBTB/PM

## 2025-08-02 ASSESSMENT — PAIN - FUNCTIONAL ASSESSMENT: PAIN_FUNCTIONAL_ASSESSMENT: 0-10

## 2025-08-02 ASSESSMENT — PAIN SCALES - GENERAL
PAINLEVEL_OUTOF10: 0
PAINLEVEL_OUTOF10: 3
PAINLEVEL_OUTOF10: 0

## 2025-08-03 LAB
ANION GAP SERPL CALCULATED.3IONS-SCNC: 10 MMOL/L (ref 3–16)
BASOPHILS # BLD: 0 K/UL (ref 0–0.2)
BASOPHILS NFR BLD: 0.8 %
BUN SERPL-MCNC: 17 MG/DL (ref 7–20)
CALCIUM SERPL-MCNC: 9.6 MG/DL (ref 8.3–10.6)
CHLORIDE SERPL-SCNC: 107 MMOL/L (ref 99–110)
CHOLEST SERPL-MCNC: 213 MG/DL (ref 0–199)
CO2 SERPL-SCNC: 23 MMOL/L (ref 21–32)
CREAT SERPL-MCNC: 0.8 MG/DL (ref 0.6–1.1)
DEPRECATED RDW RBC AUTO: 13 % (ref 12.4–15.4)
EKG ATRIAL RATE: 77 BPM
EKG DIAGNOSIS: NORMAL
EKG P AXIS: 59 DEGREES
EKG P-R INTERVAL: 182 MS
EKG Q-T INTERVAL: 360 MS
EKG QRS DURATION: 76 MS
EKG QTC CALCULATION (BAZETT): 407 MS
EKG R AXIS: 23 DEGREES
EKG T AXIS: 31 DEGREES
EKG VENTRICULAR RATE: 77 BPM
EOSINOPHIL # BLD: 0 K/UL (ref 0–0.6)
EOSINOPHIL NFR BLD: 1.3 %
EST. AVERAGE GLUCOSE BLD GHB EST-MCNC: 102.5 MG/DL
GFR SERPLBLD CREATININE-BSD FMLA CKD-EPI: 88 ML/MIN/{1.73_M2}
GLUCOSE SERPL-MCNC: 101 MG/DL (ref 70–99)
HBA1C MFR BLD: 5.2 %
HCT VFR BLD AUTO: 32.7 % (ref 36–48)
HDLC SERPL-MCNC: 75 MG/DL (ref 40–60)
HGB BLD-MCNC: 11.1 G/DL (ref 12–16)
LDLC SERPL CALC-MCNC: 121 MG/DL
LYMPHOCYTES # BLD: 1.8 K/UL (ref 1–5.1)
LYMPHOCYTES NFR BLD: 47.1 %
MAGNESIUM SERPL-MCNC: 1.93 MG/DL (ref 1.8–2.4)
MCH RBC QN AUTO: 28.9 PG (ref 26–34)
MCHC RBC AUTO-ENTMCNC: 33.9 G/DL (ref 31–36)
MCV RBC AUTO: 85.4 FL (ref 80–100)
MONOCYTES # BLD: 0.4 K/UL (ref 0–1.3)
MONOCYTES NFR BLD: 11.6 %
NEUTROPHILS # BLD: 1.5 K/UL (ref 1.7–7.7)
NEUTROPHILS NFR BLD: 39.2 %
PLATELET # BLD AUTO: 272 K/UL (ref 135–450)
PMV BLD AUTO: 7.9 FL (ref 5–10.5)
POTASSIUM SERPL-SCNC: 3.4 MMOL/L (ref 3.5–5.1)
RBC # BLD AUTO: 3.83 M/UL (ref 4–5.2)
SODIUM SERPL-SCNC: 140 MMOL/L (ref 136–145)
TRIGL SERPL-MCNC: 83 MG/DL (ref 0–150)
VLDLC SERPL CALC-MCNC: 17 MG/DL
WBC # BLD AUTO: 3.8 K/UL (ref 4–11)

## 2025-08-03 PROCEDURE — 6370000000 HC RX 637 (ALT 250 FOR IP): Performed by: INTERNAL MEDICINE

## 2025-08-03 PROCEDURE — 80048 BASIC METABOLIC PNL TOTAL CA: CPT

## 2025-08-03 PROCEDURE — 83735 ASSAY OF MAGNESIUM: CPT

## 2025-08-03 PROCEDURE — 85025 COMPLETE CBC W/AUTO DIFF WBC: CPT

## 2025-08-03 PROCEDURE — 80061 LIPID PANEL: CPT

## 2025-08-03 PROCEDURE — 2500000003 HC RX 250 WO HCPCS: Performed by: INTERNAL MEDICINE

## 2025-08-03 PROCEDURE — 93010 ELECTROCARDIOGRAM REPORT: CPT | Performed by: INTERNAL MEDICINE

## 2025-08-03 PROCEDURE — G0378 HOSPITAL OBSERVATION PER HR: HCPCS

## 2025-08-03 PROCEDURE — 6360000002 HC RX W HCPCS: Performed by: INTERNAL MEDICINE

## 2025-08-03 PROCEDURE — 96372 THER/PROPH/DIAG INJ SC/IM: CPT

## 2025-08-03 RX ORDER — NIFEDIPINE 30 MG/1
30 TABLET, EXTENDED RELEASE ORAL DAILY
Status: DISCONTINUED | OUTPATIENT
Start: 2025-08-03 | End: 2025-08-04 | Stop reason: HOSPADM

## 2025-08-03 RX ADMIN — CHOLECALCIFEROL TAB 125 MCG (5000 UNIT) 5000 UNITS: 125 TAB at 10:26

## 2025-08-03 RX ADMIN — FLUTICASONE PROPIONATE 2 SPRAY: 50 SPRAY, METERED NASAL at 10:32

## 2025-08-03 RX ADMIN — LOSARTAN POTASSIUM 50 MG: 25 TABLET, FILM COATED ORAL at 10:26

## 2025-08-03 RX ADMIN — GUAIFENESIN 1200 MG: 600 TABLET, EXTENDED RELEASE ORAL at 20:51

## 2025-08-03 RX ADMIN — CETIRIZINE HYDROCHLORIDE 10 MG: 10 TABLET, FILM COATED ORAL at 20:51

## 2025-08-03 RX ADMIN — SODIUM CHLORIDE, PRESERVATIVE FREE 10 ML: 5 INJECTION INTRAVENOUS at 10:27

## 2025-08-03 RX ADMIN — ATENOLOL 100 MG: 50 TABLET ORAL at 12:44

## 2025-08-03 RX ADMIN — AMOXICILLIN AND CLAVULANATE POTASSIUM 1 TABLET: 875; 125 TABLET, FILM COATED ORAL at 20:52

## 2025-08-03 RX ADMIN — AMOXICILLIN AND CLAVULANATE POTASSIUM 1 TABLET: 875; 125 TABLET, FILM COATED ORAL at 10:25

## 2025-08-03 RX ADMIN — POTASSIUM CHLORIDE 40 MEQ: 1500 TABLET, EXTENDED RELEASE ORAL at 10:26

## 2025-08-03 RX ADMIN — GUAIFENESIN 1200 MG: 600 TABLET, EXTENDED RELEASE ORAL at 10:26

## 2025-08-03 RX ADMIN — ASPIRIN 81 MG: 81 TABLET, DELAYED RELEASE ORAL at 10:26

## 2025-08-03 RX ADMIN — OXYCODONE HYDROCHLORIDE AND ACETAMINOPHEN 1000 MG: 500 TABLET ORAL at 10:26

## 2025-08-03 RX ADMIN — SODIUM CHLORIDE, PRESERVATIVE FREE 10 ML: 5 INJECTION INTRAVENOUS at 20:53

## 2025-08-03 RX ADMIN — ENOXAPARIN SODIUM 40 MG: 100 INJECTION SUBCUTANEOUS at 10:26

## 2025-08-03 ASSESSMENT — PAIN SCALES - GENERAL: PAINLEVEL_OUTOF10: 0

## 2025-08-04 ENCOUNTER — APPOINTMENT (OUTPATIENT)
Age: 55
End: 2025-08-04
Attending: INTERNAL MEDICINE
Payer: MEDICAID

## 2025-08-04 ENCOUNTER — HOSPITAL ENCOUNTER (OUTPATIENT)
Age: 55
Setting detail: OBSERVATION
Discharge: HOME | End: 2025-08-06
Attending: INTERNAL MEDICINE
Payer: MEDICAID

## 2025-08-04 VITALS
DIASTOLIC BLOOD PRESSURE: 82 MMHG | BODY MASS INDEX: 35.49 KG/M2 | HEIGHT: 65 IN | RESPIRATION RATE: 16 BRPM | WEIGHT: 213 LBS | HEART RATE: 66 BPM | SYSTOLIC BLOOD PRESSURE: 159 MMHG | TEMPERATURE: 97.8 F | OXYGEN SATURATION: 96 %

## 2025-08-04 VITALS
DIASTOLIC BLOOD PRESSURE: 89 MMHG | BODY MASS INDEX: 35.49 KG/M2 | WEIGHT: 213 LBS | HEART RATE: 67 BPM | HEIGHT: 65 IN | SYSTOLIC BLOOD PRESSURE: 181 MMHG

## 2025-08-04 LAB
ANION GAP SERPL CALCULATED.3IONS-SCNC: 9 MMOL/L (ref 3–16)
BASOPHILS # BLD: 0 K/UL (ref 0–0.2)
BASOPHILS NFR BLD: 0.7 %
BUN SERPL-MCNC: 21 MG/DL (ref 7–20)
CALCIUM SERPL-MCNC: 9.6 MG/DL (ref 8.3–10.6)
CHLORIDE SERPL-SCNC: 108 MMOL/L (ref 99–110)
CO2 SERPL-SCNC: 22 MMOL/L (ref 21–32)
CREAT SERPL-MCNC: 0.9 MG/DL (ref 0.6–1.1)
DEPRECATED RDW RBC AUTO: 13 % (ref 12.4–15.4)
ECHO AO ASC DIAM: 2.9 CM
ECHO AO ASCENDING AORTA INDEX: 1.43 CM/M2
ECHO AO ROOT DIAM: 2.8 CM
ECHO AO ROOT INDEX: 1.38 CM/M2
ECHO AV AREA PEAK VELOCITY: 2.3 CM2
ECHO AV AREA/BSA PEAK VELOCITY: 1.1 CM2/M2
ECHO AV PEAK GRADIENT: 10 MMHG
ECHO AV PEAK VELOCITY: 1.6 M/S
ECHO AV VELOCITY RATIO: 0.69
ECHO BSA: 2.1 M2
ECHO BSA: 2.1 M2
ECHO EST RA PRESSURE: 3 MMHG
ECHO IVC INSP: 0.7 CM
ECHO IVC PROX: 1.4 CM
ECHO LA AREA 2C: 27.5 CM2
ECHO LA AREA 4C: 29.6 CM2
ECHO LA MAJOR AXIS: 6.2 CM
ECHO LA MINOR AXIS: 6.2 CM
ECHO LA VOL BP: 107 ML (ref 22–52)
ECHO LA VOL MOD A2C: 98 ML (ref 22–52)
ECHO LA VOL MOD A4C: 117 ML (ref 22–52)
ECHO LA VOL/BSA BIPLANE: 53 ML/M2 (ref 16–34)
ECHO LA VOLUME INDEX MOD A2C: 48 ML/M2 (ref 16–34)
ECHO LA VOLUME INDEX MOD A4C: 58 ML/M2 (ref 16–34)
ECHO LV E' LATERAL VELOCITY: 9.68 CM/S
ECHO LV E' SEPTAL VELOCITY: 7.51 CM/S
ECHO LV EDV 3D: 147 ML
ECHO LV EDV INDEX 3D: 72 ML/M2
ECHO LV EF PHYSICIAN: 62 %
ECHO LV EJECTION FRACTION 3D: 61 %
ECHO LV ESV 3D: 58 ML
ECHO LV ESV INDEX 3D: 29 ML/M2
ECHO LV FRACTIONAL SHORTENING: 35 % (ref 28–44)
ECHO LV INTERNAL DIMENSION DIASTOLE INDEX: 2.36 CM/M2
ECHO LV INTERNAL DIMENSION DIASTOLIC MMODE: 5.1 CM (ref 3.9–5.3)
ECHO LV INTERNAL DIMENSION DIASTOLIC: 4.8 CM (ref 3.9–5.3)
ECHO LV INTERNAL DIMENSION SYSTOLIC INDEX: 1.53 CM/M2
ECHO LV INTERNAL DIMENSION SYSTOLIC MMODE: 3.3 CM
ECHO LV INTERNAL DIMENSION SYSTOLIC: 3.1 CM
ECHO LV IVSD MMODE: 1 CM (ref 0.6–0.9)
ECHO LV IVSD: 1 CM (ref 0.6–0.9)
ECHO LV MASS 2D: 158.8 G (ref 67–162)
ECHO LV MASS 3D INDEX: 83.7 G/M2
ECHO LV MASS 3D: 170 G
ECHO LV MASS INDEX 2D: 78.2 G/M2 (ref 43–95)
ECHO LV POSTERIOR WALL DIASTOLIC MMODE: 1 CM (ref 0.6–0.9)
ECHO LV POSTERIOR WALL DIASTOLIC: 0.9 CM (ref 0.6–0.9)
ECHO LV RELATIVE WALL THICKNESS RATIO: 0.38
ECHO LVOT AREA: 3.5 CM2
ECHO LVOT DIAM: 2.1 CM
ECHO LVOT MEAN GRADIENT: 3 MMHG
ECHO LVOT PEAK GRADIENT: 5 MMHG
ECHO LVOT PEAK VELOCITY: 1.1 M/S
ECHO LVOT STROKE VOLUME INDEX: 42.5 ML/M2
ECHO LVOT SV: 86.2 ML
ECHO LVOT VTI: 24.9 CM
ECHO MV A VELOCITY: 0.89 M/S
ECHO MV E VELOCITY: 0.66 M/S
ECHO MV E/A RATIO: 0.74
ECHO MV E/E' LATERAL: 6.82
ECHO MV E/E' RATIO (AVERAGED): 7.8
ECHO MV E/E' SEPTAL: 8.79
ECHO PULMONARY ARTERY END DIASTOLIC PRESSURE: 3 MMHG
ECHO PV MAX VELOCITY: 0.9 M/S
ECHO PV PEAK GRADIENT: 3 MMHG
ECHO PV REGURGITANT MAX VELOCITY: 0.9 M/S
ECHO RA AREA 4C: 11.4 CM2
ECHO RA END SYSTOLIC VOLUME APICAL 4 CHAMBER INDEX BSA: 11 ML/M2
ECHO RA VOLUME: 23 ML
ECHO RIGHT VENTRICULAR SYSTOLIC PRESSURE (RVSP): 29 MMHG
ECHO RV BASAL DIMENSION: 3.4 CM
ECHO RV FREE WALL PEAK S': 11.6 CM/S
ECHO RV LONGITUDINAL DIMENSION: 8 CM
ECHO RV MID DIMENSION: 2.4 CM
ECHO RV TAPSE: 1.8 CM (ref 1.7–?)
ECHO TV REGURGITANT MAX VELOCITY: 2.54 M/S
ECHO TV REGURGITANT PEAK GRADIENT: 26 MMHG
EOSINOPHIL # BLD: 0.1 K/UL (ref 0–0.6)
EOSINOPHIL NFR BLD: 1.8 %
GFR SERPLBLD CREATININE-BSD FMLA CKD-EPI: 76 ML/MIN/{1.73_M2}
GLUCOSE SERPL-MCNC: 103 MG/DL (ref 70–99)
HCT VFR BLD AUTO: 31.7 % (ref 36–48)
HGB BLD-MCNC: 10.5 G/DL (ref 12–16)
LYMPHOCYTES # BLD: 1.7 K/UL (ref 1–5.1)
LYMPHOCYTES NFR BLD: 47.5 %
MCH RBC QN AUTO: 28.5 PG (ref 26–34)
MCHC RBC AUTO-ENTMCNC: 33.1 G/DL (ref 31–36)
MCV RBC AUTO: 86.1 FL (ref 80–100)
MONOCYTES # BLD: 0.5 K/UL (ref 0–1.3)
MONOCYTES NFR BLD: 13.5 %
NEUTROPHILS # BLD: 1.3 K/UL (ref 1.7–7.7)
NEUTROPHILS NFR BLD: 36.5 %
NUC STRESS EJECTION FRACTION: 69 %
NUC STRESS LV EDV: 107 ML (ref 56–104)
NUC STRESS LV ESV: 33 ML (ref 19–49)
NUC STRESS LV MASS: 140 G
PLATELET # BLD AUTO: 244 K/UL (ref 135–450)
PMV BLD AUTO: 7.8 FL (ref 5–10.5)
POTASSIUM SERPL-SCNC: 4.2 MMOL/L (ref 3.5–5.1)
RBC # BLD AUTO: 3.68 M/UL (ref 4–5.2)
SODIUM SERPL-SCNC: 139 MMOL/L (ref 136–145)
STRESS BASELINE DIAS BP: 89 MMHG
STRESS BASELINE HR: 73 BPM
STRESS BASELINE SYS BP: 181 MMHG
STRESS ESTIMATED WORKLOAD: 5.8 METS
STRESS EXERCISE DUR MIN: 3 MIN
STRESS EXERCISE DUR SEC: 31 SEC
STRESS O2 SAT PEAK: 98 %
STRESS O2 SAT REST: 99 %
STRESS PEAK DIAS BP: 103 MMHG
STRESS PEAK SYS BP: 218 MMHG
STRESS PERCENT HR ACHIEVED: 99 %
STRESS POST PEAK HR: 166 BPM
STRESS RATE PRESSURE PRODUCT: ABNORMAL BPM*MMHG
STRESS TARGET HR: 167 BPM
TID: 0.98
WBC # BLD AUTO: 3.5 K/UL (ref 4–11)

## 2025-08-04 PROCEDURE — 2500000003 HC RX 250 WO HCPCS: Performed by: INTERNAL MEDICINE

## 2025-08-04 PROCEDURE — 80048 BASIC METABOLIC PNL TOTAL CA: CPT

## 2025-08-04 PROCEDURE — 6370000000 HC RX 637 (ALT 250 FOR IP): Performed by: INTERNAL MEDICINE

## 2025-08-04 PROCEDURE — 93017 CV STRESS TEST TRACING ONLY: CPT

## 2025-08-04 PROCEDURE — 78452 HT MUSCLE IMAGE SPECT MULT: CPT | Performed by: INTERNAL MEDICINE

## 2025-08-04 PROCEDURE — 93018 CV STRESS TEST I&R ONLY: CPT | Performed by: INTERNAL MEDICINE

## 2025-08-04 PROCEDURE — 94760 N-INVAS EAR/PLS OXIMETRY 1: CPT

## 2025-08-04 PROCEDURE — 78452 HT MUSCLE IMAGE SPECT MULT: CPT

## 2025-08-04 PROCEDURE — 76376 3D RENDER W/INTRP POSTPROCES: CPT | Performed by: STUDENT IN AN ORGANIZED HEALTH CARE EDUCATION/TRAINING PROGRAM

## 2025-08-04 PROCEDURE — 93016 CV STRESS TEST SUPVJ ONLY: CPT | Performed by: INTERNAL MEDICINE

## 2025-08-04 PROCEDURE — 3430000000 HC RX DIAGNOSTIC RADIOPHARMACEUTICAL: Performed by: INTERNAL MEDICINE

## 2025-08-04 PROCEDURE — G0378 HOSPITAL OBSERVATION PER HR: HCPCS

## 2025-08-04 PROCEDURE — 76376 3D RENDER W/INTRP POSTPROCES: CPT

## 2025-08-04 PROCEDURE — A9502 TC99M TETROFOSMIN: HCPCS | Performed by: INTERNAL MEDICINE

## 2025-08-04 PROCEDURE — 85025 COMPLETE CBC W/AUTO DIFF WBC: CPT

## 2025-08-04 PROCEDURE — 93306 TTE W/DOPPLER COMPLETE: CPT | Performed by: STUDENT IN AN ORGANIZED HEALTH CARE EDUCATION/TRAINING PROGRAM

## 2025-08-04 RX ORDER — CETIRIZINE HYDROCHLORIDE 10 MG/1
10 TABLET ORAL DAILY
Qty: 30 TABLET | Refills: 0 | Status: SHIPPED | OUTPATIENT
Start: 2025-08-04 | End: 2025-08-08 | Stop reason: SDUPTHER

## 2025-08-04 RX ORDER — NIFEDIPINE 30 MG/1
30 TABLET, EXTENDED RELEASE ORAL DAILY
Qty: 30 TABLET | Refills: 0 | Status: SHIPPED | OUTPATIENT
Start: 2025-08-05 | End: 2025-08-08

## 2025-08-04 RX ORDER — LOSARTAN POTASSIUM 50 MG/1
50 TABLET ORAL DAILY
Qty: 30 TABLET | Refills: 0 | Status: CANCELLED | OUTPATIENT
Start: 2025-08-05

## 2025-08-04 RX ORDER — ATORVASTATIN CALCIUM 40 MG/1
40 TABLET, FILM COATED ORAL NIGHTLY
Qty: 30 TABLET | Refills: 0 | Status: SHIPPED | OUTPATIENT
Start: 2025-08-04 | End: 2025-08-08

## 2025-08-04 RX ORDER — GUAIFENESIN 600 MG/1
1200 TABLET, EXTENDED RELEASE ORAL 2 TIMES DAILY
Qty: 50 TABLET | Refills: 0 | Status: SHIPPED | OUTPATIENT
Start: 2025-08-04 | End: 2025-08-17

## 2025-08-04 RX ORDER — PANTOPRAZOLE SODIUM 40 MG/1
40 TABLET, DELAYED RELEASE ORAL
Qty: 30 TABLET | Refills: 0 | Status: SHIPPED | OUTPATIENT
Start: 2025-08-05 | End: 2025-08-08 | Stop reason: SDUPTHER

## 2025-08-04 RX ORDER — LOSARTAN POTASSIUM 50 MG/1
50 TABLET ORAL DAILY
Qty: 30 TABLET | Refills: 0 | Status: SHIPPED | OUTPATIENT
Start: 2025-08-05 | End: 2025-08-08

## 2025-08-04 RX ORDER — ASPIRIN 81 MG/1
81 TABLET ORAL DAILY
Qty: 30 TABLET | Refills: 0 | Status: SHIPPED | OUTPATIENT
Start: 2025-08-05

## 2025-08-04 RX ORDER — FLUTICASONE PROPIONATE 50 MCG
2 SPRAY, SUSPENSION (ML) NASAL DAILY
Qty: 16 G | Refills: 0 | Status: SHIPPED | OUTPATIENT
Start: 2025-08-05

## 2025-08-04 RX ADMIN — NIFEDIPINE 30 MG: 30 TABLET, FILM COATED, EXTENDED RELEASE ORAL at 08:01

## 2025-08-04 RX ADMIN — PANTOPRAZOLE SODIUM 40 MG: 40 TABLET, DELAYED RELEASE ORAL at 05:20

## 2025-08-04 RX ADMIN — ATENOLOL 100 MG: 50 TABLET ORAL at 11:56

## 2025-08-04 RX ADMIN — FLUTICASONE PROPIONATE 2 SPRAY: 50 SPRAY, METERED NASAL at 08:05

## 2025-08-04 RX ADMIN — ASPIRIN 81 MG: 81 TABLET, DELAYED RELEASE ORAL at 08:01

## 2025-08-04 RX ADMIN — CHOLECALCIFEROL TAB 125 MCG (5000 UNIT) 5000 UNITS: 125 TAB at 08:00

## 2025-08-04 RX ADMIN — SODIUM CHLORIDE, PRESERVATIVE FREE 10 ML: 5 INJECTION INTRAVENOUS at 08:01

## 2025-08-04 RX ADMIN — TETROFOSMIN 10.6 MILLICURIE: 1.38 INJECTION, POWDER, LYOPHILIZED, FOR SOLUTION INTRAVENOUS at 08:53

## 2025-08-04 RX ADMIN — AMOXICILLIN AND CLAVULANATE POTASSIUM 1 TABLET: 875; 125 TABLET, FILM COATED ORAL at 08:01

## 2025-08-04 RX ADMIN — GUAIFENESIN 1200 MG: 600 TABLET, EXTENDED RELEASE ORAL at 08:01

## 2025-08-04 RX ADMIN — TETROFOSMIN 32.2 MILLICURIE: 1.38 INJECTION, POWDER, LYOPHILIZED, FOR SOLUTION INTRAVENOUS at 10:07

## 2025-08-04 RX ADMIN — LOSARTAN POTASSIUM 50 MG: 25 TABLET, FILM COATED ORAL at 08:01

## 2025-08-04 RX ADMIN — OXYCODONE HYDROCHLORIDE AND ACETAMINOPHEN 1000 MG: 500 TABLET ORAL at 08:01

## 2025-08-04 ASSESSMENT — PAIN DESCRIPTION - DESCRIPTORS: DESCRIPTORS: ACHING

## 2025-08-04 ASSESSMENT — PAIN SCALES - GENERAL
PAINLEVEL_OUTOF10: 0
PAINLEVEL_OUTOF10: 1

## 2025-08-04 ASSESSMENT — PAIN DESCRIPTION - LOCATION: LOCATION: BACK

## 2025-08-05 ENCOUNTER — CARE COORDINATION (OUTPATIENT)
Dept: CASE MANAGEMENT | Age: 55
End: 2025-08-05

## 2025-08-06 ENCOUNTER — CARE COORDINATION (OUTPATIENT)
Dept: CASE MANAGEMENT | Age: 55
End: 2025-08-06

## 2025-08-07 DIAGNOSIS — I10 HTN, GOAL BELOW 140/90: ICD-10-CM

## 2025-08-07 RX ORDER — ATENOLOL 100 MG/1
100 TABLET ORAL DAILY
Qty: 30 TABLET | Refills: 0 | Status: SHIPPED | OUTPATIENT
Start: 2025-08-07 | End: 2025-08-08 | Stop reason: SDUPTHER

## 2025-08-08 ENCOUNTER — TELEPHONE (OUTPATIENT)
Dept: INTERNAL MEDICINE CLINIC | Age: 55
End: 2025-08-08

## 2025-08-08 ENCOUNTER — OFFICE VISIT (OUTPATIENT)
Dept: INTERNAL MEDICINE CLINIC | Age: 55
End: 2025-08-08
Payer: MEDICAID

## 2025-08-08 VITALS
BODY MASS INDEX: 35.84 KG/M2 | OXYGEN SATURATION: 99 % | TEMPERATURE: 97.2 F | DIASTOLIC BLOOD PRESSURE: 86 MMHG | SYSTOLIC BLOOD PRESSURE: 122 MMHG | HEART RATE: 79 BPM | WEIGHT: 215.4 LBS

## 2025-08-08 DIAGNOSIS — I51.7 LEFT ATRIAL ENLARGEMENT: ICD-10-CM

## 2025-08-08 DIAGNOSIS — Z09 HOSPITAL DISCHARGE FOLLOW-UP: Primary | ICD-10-CM

## 2025-08-08 DIAGNOSIS — I10 HTN, GOAL BELOW 140/90: ICD-10-CM

## 2025-08-08 DIAGNOSIS — I70.0 ATHEROSCLEROSIS OF AORTIC ARCH: ICD-10-CM

## 2025-08-08 DIAGNOSIS — J01.11 ACUTE RECURRENT FRONTAL SINUSITIS: ICD-10-CM

## 2025-08-08 PROCEDURE — 1111F DSCHRG MED/CURRENT MED MERGE: CPT | Performed by: INTERNAL MEDICINE

## 2025-08-08 PROCEDURE — 99214 OFFICE O/P EST MOD 30 MIN: CPT | Performed by: INTERNAL MEDICINE

## 2025-08-08 RX ORDER — LOSARTAN POTASSIUM 100 MG/1
100 TABLET ORAL DAILY
Qty: 90 TABLET | Refills: 1 | Status: SHIPPED | OUTPATIENT
Start: 2025-08-08 | End: 2026-02-04

## 2025-08-08 RX ORDER — ATENOLOL 100 MG/1
100 TABLET ORAL DAILY
Qty: 90 TABLET | Refills: 1 | Status: SHIPPED | OUTPATIENT
Start: 2025-08-08

## 2025-08-08 RX ORDER — CETIRIZINE HYDROCHLORIDE 10 MG/1
10 TABLET ORAL DAILY
Qty: 30 TABLET | Refills: 0 | Status: SHIPPED | OUTPATIENT
Start: 2025-08-08

## 2025-08-08 RX ORDER — NIFEDIPINE 30 MG/1
30 TABLET, EXTENDED RELEASE ORAL DAILY
Qty: 90 TABLET | Refills: 1 | Status: SHIPPED | OUTPATIENT
Start: 2025-08-08

## 2025-08-08 RX ORDER — PANTOPRAZOLE SODIUM 40 MG/1
40 TABLET, DELAYED RELEASE ORAL
Qty: 30 TABLET | Refills: 0 | Status: SHIPPED | OUTPATIENT
Start: 2025-08-08

## 2025-08-08 RX ORDER — ATORVASTATIN CALCIUM 40 MG/1
40 TABLET, FILM COATED ORAL NIGHTLY
Qty: 90 TABLET | Refills: 1 | Status: SHIPPED | OUTPATIENT
Start: 2025-08-08

## 2025-08-08 SDOH — ECONOMIC STABILITY: FOOD INSECURITY: WITHIN THE PAST 12 MONTHS, YOU WORRIED THAT YOUR FOOD WOULD RUN OUT BEFORE YOU GOT MONEY TO BUY MORE.: NEVER TRUE

## 2025-08-08 SDOH — ECONOMIC STABILITY: FOOD INSECURITY: WITHIN THE PAST 12 MONTHS, THE FOOD YOU BOUGHT JUST DIDN'T LAST AND YOU DIDN'T HAVE MONEY TO GET MORE.: NEVER TRUE

## 2025-08-08 ASSESSMENT — PATIENT HEALTH QUESTIONNAIRE - PHQ9
SUM OF ALL RESPONSES TO PHQ QUESTIONS 1-9: 0
2. FEELING DOWN, DEPRESSED OR HOPELESS: NOT AT ALL
SUM OF ALL RESPONSES TO PHQ QUESTIONS 1-9: 0
SUM OF ALL RESPONSES TO PHQ QUESTIONS 1-9: 0
1. LITTLE INTEREST OR PLEASURE IN DOING THINGS: NOT AT ALL
SUM OF ALL RESPONSES TO PHQ QUESTIONS 1-9: 0

## 2025-08-12 ENCOUNTER — TELEPHONE (OUTPATIENT)
Dept: CARDIOLOGY CLINIC | Age: 55
End: 2025-08-12

## 2025-08-12 DIAGNOSIS — D50.8 OTHER IRON DEFICIENCY ANEMIA: Primary | ICD-10-CM

## 2025-08-29 RX ORDER — NIFEDIPINE 30 MG/1
30 TABLET, EXTENDED RELEASE ORAL DAILY
Qty: 90 TABLET | Refills: 1 | Status: SHIPPED | OUTPATIENT
Start: 2025-08-29

## 2025-08-29 RX ORDER — ATORVASTATIN CALCIUM 40 MG/1
40 TABLET, FILM COATED ORAL NIGHTLY
Qty: 90 TABLET | Refills: 1 | Status: SHIPPED | OUTPATIENT
Start: 2025-08-29

## 2025-09-06 DIAGNOSIS — J01.11 ACUTE RECURRENT FRONTAL SINUSITIS: ICD-10-CM

## 2025-09-07 RX ORDER — CETIRIZINE HYDROCHLORIDE 10 MG/1
10 TABLET ORAL DAILY
Qty: 90 TABLET | Refills: 1 | Status: SHIPPED | OUTPATIENT
Start: 2025-09-07

## (undated) DEVICE — SOLUTION IV IRRIG WATER 500ML POUR BRL ST 2F7113

## (undated) DEVICE — BW-412T DISP COMBO CLEANING BRUSH: Brand: SINGLE USE COMBINATION CLEANING BRUSH

## (undated) DEVICE — SET VLV 3 PC AWS DISPOSABLE GRDIAN SCOPEVALET

## (undated) DEVICE — PROCEDURE KIT ENDOSCP CUST